# Patient Record
Sex: MALE | Race: WHITE | NOT HISPANIC OR LATINO | Employment: OTHER | URBAN - NONMETROPOLITAN AREA
[De-identification: names, ages, dates, MRNs, and addresses within clinical notes are randomized per-mention and may not be internally consistent; named-entity substitution may affect disease eponyms.]

---

## 2017-01-05 ENCOUNTER — AMBULATORY - GICH (OUTPATIENT)
Dept: SCHEDULING | Facility: OTHER | Age: 62
End: 2017-01-05

## 2017-01-06 ENCOUNTER — AMBULATORY - GICH (OUTPATIENT)
Dept: RADIOLOGY | Facility: OTHER | Age: 62
End: 2017-01-06

## 2017-01-06 DIAGNOSIS — Z98.890 OTHER SPECIFIED POSTPROCEDURAL STATES: ICD-10-CM

## 2017-01-06 DIAGNOSIS — R52 PAIN: ICD-10-CM

## 2017-01-09 ENCOUNTER — AMBULATORY - GICH (OUTPATIENT)
Dept: UROLOGY | Facility: OTHER | Age: 62
End: 2017-01-09

## 2017-01-09 DIAGNOSIS — E29.1 TESTICULAR HYPOFUNCTION: ICD-10-CM

## 2017-01-13 ENCOUNTER — HOSPITAL ENCOUNTER (OUTPATIENT)
Dept: RADIOLOGY | Facility: OTHER | Age: 62
End: 2017-01-13

## 2017-01-13 DIAGNOSIS — R52 PAIN: ICD-10-CM

## 2017-01-13 DIAGNOSIS — Z98.890 OTHER SPECIFIED POSTPROCEDURAL STATES: ICD-10-CM

## 2017-01-26 ENCOUNTER — AMBULATORY - GICH (OUTPATIENT)
Dept: LAB | Facility: OTHER | Age: 62
End: 2017-01-26

## 2017-01-26 DIAGNOSIS — E29.1 TESTICULAR HYPOFUNCTION: ICD-10-CM

## 2017-01-26 DIAGNOSIS — R39.12 POOR URINARY STREAM: ICD-10-CM

## 2017-01-26 LAB
HCT VFR BLD AUTO: 55.7 % (ref 37–53)
PSA TOTAL (DIAGNOSTIC) - HISTORICAL: 0.87 NG/ML
TESTOST SERPL-MCNC: 333.5 NG/DL (ref 175–781)

## 2017-01-31 ENCOUNTER — COMMUNICATION - GICH (OUTPATIENT)
Dept: UROLOGY | Facility: OTHER | Age: 62
End: 2017-01-31

## 2017-01-31 DIAGNOSIS — E29.1 TESTICULAR HYPOFUNCTION: ICD-10-CM

## 2017-02-09 ENCOUNTER — OFFICE VISIT - GICH (OUTPATIENT)
Dept: UROLOGY | Facility: OTHER | Age: 62
End: 2017-02-09

## 2017-02-09 ENCOUNTER — HISTORY (OUTPATIENT)
Dept: UROLOGY | Facility: OTHER | Age: 62
End: 2017-02-09

## 2017-02-09 ENCOUNTER — AMBULATORY - GICH (OUTPATIENT)
Dept: LAB | Facility: OTHER | Age: 62
End: 2017-02-09

## 2017-02-09 DIAGNOSIS — E29.1 TESTICULAR HYPOFUNCTION: ICD-10-CM

## 2017-02-09 LAB — HCT VFR BLD AUTO: 48.6 % (ref 37–53)

## 2017-02-17 ENCOUNTER — HISTORY (OUTPATIENT)
Dept: FAMILY MEDICINE | Facility: OTHER | Age: 62
End: 2017-02-17

## 2017-02-17 ENCOUNTER — OFFICE VISIT - GICH (OUTPATIENT)
Dept: FAMILY MEDICINE | Facility: OTHER | Age: 62
End: 2017-02-17

## 2017-02-17 DIAGNOSIS — M96.1 POSTLAMINECTOMY SYNDROME, NOT ELSEWHERE CLASSIFIED: ICD-10-CM

## 2017-02-17 DIAGNOSIS — Z79.899 OTHER LONG TERM (CURRENT) DRUG THERAPY: ICD-10-CM

## 2017-02-17 DIAGNOSIS — F33.0 MAJOR DEPRESSIVE DISORDER, RECURRENT, MILD (H): ICD-10-CM

## 2017-02-17 DIAGNOSIS — M54.2 CERVICALGIA: ICD-10-CM

## 2017-02-27 LAB
6-MONOACETYL MORPHINE - HISTORICAL: ABNORMAL NG/ML
AMPHETAMINE URINE - HISTORICAL: ABNORMAL NG/ML
BARBITURATE URINE - HISTORICAL: ABNORMAL NG/ML
BENZODIAZEPINE URINE - HISTORICAL: ABNORMAL NG/ML
BUPRENORPHRINE URINE - HISTORICAL: ABNORMAL NG/ML
COCAINE METAB URINE - HISTORICAL: ABNORMAL NG/ML
CREAT UR - HISTORICAL: 98 MG/DL
ETHYLGLUCURONIDE URINE - HISTORICAL: ABNORMAL NG/ML
FENTANYL URINE - HISTORICAL: ABNORMAL NG/ML
MASS SPECTROMETRY URINE - HISTORICAL: ABNORMAL
METHADONE URINE - HISTORICAL: ABNORMAL NG/ML
OPIATES URINE - HISTORICAL: ABNORMAL NG/ML
OXYCODONE URINE - HISTORICAL: ABNORMAL NG/ML
PH URINE - HISTORICAL: 7.6
PROPOXYPHENE URINE - HISTORICAL: ABNORMAL NG/ML
THC 50 URINE - HISTORICAL: ABNORMAL NG/ML
TRAMADOL - HISTORICAL: ABNORMAL NG/ML

## 2017-03-13 ENCOUNTER — AMBULATORY - GICH (OUTPATIENT)
Dept: UROLOGY | Facility: OTHER | Age: 62
End: 2017-03-13

## 2017-03-13 DIAGNOSIS — E29.1 TESTICULAR HYPOFUNCTION: ICD-10-CM

## 2017-03-15 ENCOUNTER — AMBULATORY - GICH (OUTPATIENT)
Dept: RADIOLOGY | Facility: OTHER | Age: 62
End: 2017-03-15

## 2017-03-15 DIAGNOSIS — G89.29 OTHER CHRONIC PAIN: ICD-10-CM

## 2017-03-15 DIAGNOSIS — M53.3 SACROCOCCYGEAL DISORDERS, NOT ELSEWHERE CLASSIFIED: ICD-10-CM

## 2017-03-15 DIAGNOSIS — R52 PAIN: ICD-10-CM

## 2017-03-15 DIAGNOSIS — Z98.890 OTHER SPECIFIED POSTPROCEDURAL STATES: ICD-10-CM

## 2017-03-20 ENCOUNTER — HOSPITAL ENCOUNTER (OUTPATIENT)
Dept: RADIOLOGY | Facility: OTHER | Age: 62
End: 2017-03-20

## 2017-03-20 DIAGNOSIS — Z98.890 OTHER SPECIFIED POSTPROCEDURAL STATES: ICD-10-CM

## 2017-03-20 DIAGNOSIS — M53.3 SACROCOCCYGEAL DISORDERS, NOT ELSEWHERE CLASSIFIED: ICD-10-CM

## 2017-03-20 DIAGNOSIS — G89.29 OTHER CHRONIC PAIN: ICD-10-CM

## 2017-03-20 DIAGNOSIS — R52 PAIN: ICD-10-CM

## 2017-04-20 ENCOUNTER — HISTORY (OUTPATIENT)
Dept: UROLOGY | Facility: OTHER | Age: 62
End: 2017-04-20

## 2017-04-20 ENCOUNTER — OFFICE VISIT - GICH (OUTPATIENT)
Dept: UROLOGY | Facility: OTHER | Age: 62
End: 2017-04-20

## 2017-04-20 DIAGNOSIS — E29.1 TESTICULAR HYPOFUNCTION: ICD-10-CM

## 2017-04-27 ENCOUNTER — AMBULATORY - GICH (OUTPATIENT)
Dept: UROLOGY | Facility: OTHER | Age: 62
End: 2017-04-27

## 2017-04-27 DIAGNOSIS — E29.1 TESTICULAR HYPOFUNCTION: ICD-10-CM

## 2017-05-15 ENCOUNTER — OFFICE VISIT - GICH (OUTPATIENT)
Dept: FAMILY MEDICINE | Facility: OTHER | Age: 62
End: 2017-05-15

## 2017-05-15 DIAGNOSIS — M53.3 SACROCOCCYGEAL DISORDERS, NOT ELSEWHERE CLASSIFIED: ICD-10-CM

## 2017-05-15 DIAGNOSIS — M96.1 POSTLAMINECTOMY SYNDROME, NOT ELSEWHERE CLASSIFIED: ICD-10-CM

## 2017-05-15 DIAGNOSIS — M54.2 CERVICALGIA: ICD-10-CM

## 2017-05-15 DIAGNOSIS — Z02.89 ENCOUNTER FOR OTHER ADMINISTRATIVE EXAMINATIONS: ICD-10-CM

## 2017-05-17 ENCOUNTER — HOSPITAL ENCOUNTER (OUTPATIENT)
Dept: PHYSICAL THERAPY | Facility: OTHER | Age: 62
Setting detail: THERAPIES SERIES
End: 2017-05-17
Attending: FAMILY MEDICINE

## 2017-05-17 DIAGNOSIS — M53.3 SACROCOCCYGEAL DISORDERS, NOT ELSEWHERE CLASSIFIED: ICD-10-CM

## 2017-05-31 ENCOUNTER — HOSPITAL ENCOUNTER (OUTPATIENT)
Dept: PHYSICAL THERAPY | Facility: OTHER | Age: 62
Setting detail: THERAPIES SERIES
End: 2017-05-31
Attending: FAMILY MEDICINE

## 2017-06-05 ENCOUNTER — HOSPITAL ENCOUNTER (OUTPATIENT)
Dept: PHYSICAL THERAPY | Facility: OTHER | Age: 62
Setting detail: THERAPIES SERIES
End: 2017-06-05
Attending: FAMILY MEDICINE

## 2017-06-08 ENCOUNTER — HOSPITAL ENCOUNTER (OUTPATIENT)
Dept: PHYSICAL THERAPY | Facility: OTHER | Age: 62
Setting detail: THERAPIES SERIES
End: 2017-06-08
Attending: FAMILY MEDICINE

## 2017-06-13 ENCOUNTER — HOSPITAL ENCOUNTER (OUTPATIENT)
Dept: PHYSICAL THERAPY | Facility: OTHER | Age: 62
Setting detail: THERAPIES SERIES
End: 2017-06-13
Attending: FAMILY MEDICINE

## 2017-06-15 ENCOUNTER — HOSPITAL ENCOUNTER (OUTPATIENT)
Dept: PHYSICAL THERAPY | Facility: OTHER | Age: 62
Setting detail: THERAPIES SERIES
End: 2017-06-15
Attending: FAMILY MEDICINE

## 2017-06-15 ENCOUNTER — AMBULATORY - GICH (OUTPATIENT)
Dept: LAB | Facility: OTHER | Age: 62
End: 2017-06-15

## 2017-06-15 DIAGNOSIS — E29.1 TESTICULAR HYPOFUNCTION: ICD-10-CM

## 2017-06-15 LAB
HCT VFR BLD AUTO: 51.1 % (ref 37–53)
TESTOST SERPL-MCNC: 421.5 NG/DL (ref 175–781)

## 2017-06-20 ENCOUNTER — HOSPITAL ENCOUNTER (OUTPATIENT)
Dept: PHYSICAL THERAPY | Facility: OTHER | Age: 62
Setting detail: THERAPIES SERIES
End: 2017-06-20
Attending: FAMILY MEDICINE

## 2017-06-27 ENCOUNTER — HOSPITAL ENCOUNTER (OUTPATIENT)
Dept: PHYSICAL THERAPY | Facility: OTHER | Age: 62
Setting detail: THERAPIES SERIES
End: 2017-06-27
Attending: FAMILY MEDICINE

## 2017-06-29 ENCOUNTER — HISTORY (OUTPATIENT)
Dept: UROLOGY | Facility: OTHER | Age: 62
End: 2017-06-29

## 2017-06-29 ENCOUNTER — HOSPITAL ENCOUNTER (OUTPATIENT)
Dept: PHYSICAL THERAPY | Facility: OTHER | Age: 62
Setting detail: THERAPIES SERIES
End: 2017-06-29
Attending: FAMILY MEDICINE

## 2017-06-29 ENCOUNTER — OFFICE VISIT - GICH (OUTPATIENT)
Dept: UROLOGY | Facility: OTHER | Age: 62
End: 2017-06-29

## 2017-06-29 DIAGNOSIS — E29.1 TESTICULAR HYPOFUNCTION: ICD-10-CM

## 2017-06-29 DIAGNOSIS — R39.12 POOR URINARY STREAM: ICD-10-CM

## 2017-07-05 ENCOUNTER — OFFICE VISIT - GICH (OUTPATIENT)
Dept: FAMILY MEDICINE | Facility: OTHER | Age: 62
End: 2017-07-05

## 2017-07-05 ENCOUNTER — HISTORY (OUTPATIENT)
Dept: FAMILY MEDICINE | Facility: OTHER | Age: 62
End: 2017-07-05
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
## 2017-07-25 ENCOUNTER — AMBULATORY - GICH (OUTPATIENT)
Dept: UROLOGY | Facility: OTHER | Age: 62
End: 2017-07-25

## 2017-07-25 DIAGNOSIS — E29.1 TESTICULAR HYPOFUNCTION: ICD-10-CM

## 2017-07-26 ENCOUNTER — AMBULATORY - GICH (OUTPATIENT)
Dept: SCHEDULING | Facility: OTHER | Age: 62
End: 2017-07-26

## 2017-08-02 ENCOUNTER — OFFICE VISIT - GICH (OUTPATIENT)
Dept: FAMILY MEDICINE | Facility: OTHER | Age: 62
End: 2017-08-02

## 2017-08-02 ENCOUNTER — HISTORY (OUTPATIENT)
Dept: FAMILY MEDICINE | Facility: OTHER | Age: 62
End: 2017-08-02

## 2017-08-02 DIAGNOSIS — E29.1 TESTICULAR HYPOFUNCTION: ICD-10-CM

## 2017-08-02 DIAGNOSIS — M54.2 CERVICALGIA: ICD-10-CM

## 2017-08-02 DIAGNOSIS — R39.12 POOR URINARY STREAM: ICD-10-CM

## 2017-08-02 DIAGNOSIS — M62.82 RHABDOMYOLYSIS: ICD-10-CM

## 2017-08-02 DIAGNOSIS — Z02.89 ENCOUNTER FOR OTHER ADMINISTRATIVE EXAMINATIONS: ICD-10-CM

## 2017-08-02 DIAGNOSIS — M96.1 POSTLAMINECTOMY SYNDROME, NOT ELSEWHERE CLASSIFIED: ICD-10-CM

## 2017-08-02 DIAGNOSIS — M79.10 MYALGIA: ICD-10-CM

## 2017-08-02 LAB
ANION GAP - HISTORICAL: 8 (ref 5–18)
BUN SERPL-MCNC: 19 MG/DL (ref 7–25)
BUN/CREAT RATIO - HISTORICAL: 18
CALCIUM SERPL-MCNC: 9.5 MG/DL (ref 8.6–10.3)
CHLORIDE SERPLBLD-SCNC: 100 MMOL/L (ref 98–107)
CK SERPL-CCNC: 293 IU/L (ref 30–223)
CO2 SERPL-SCNC: 28 MMOL/L (ref 21–31)
CREAT SERPL-MCNC: 1.05 MG/DL (ref 0.7–1.3)
GFR IF NOT AFRICAN AMERICAN - HISTORICAL: >60 ML/MIN/1.73M2
GLUCOSE SERPL-MCNC: 94 MG/DL (ref 70–105)
HCT VFR BLD AUTO: 54 % (ref 37–53)
POTASSIUM SERPL-SCNC: 4.2 MMOL/L (ref 3.5–5.1)
PSA TOTAL (DIAGNOSTIC) - HISTORICAL: 0.88 NG/ML
SODIUM SERPL-SCNC: 136 MMOL/L (ref 133–143)
TESTOST SERPL-MCNC: 518.1 NG/DL (ref 175–781)
TSH - HISTORICAL: 7.64 UIU/ML (ref 0.34–5.6)

## 2017-08-03 LAB — LYME SCREEN W/REFLEX WEST BLOT - HISTORICAL: NEGATIVE

## 2017-08-07 ENCOUNTER — COMMUNICATION - GICH (OUTPATIENT)
Dept: FAMILY MEDICINE | Facility: OTHER | Age: 62
End: 2017-08-07

## 2017-08-10 LAB
6-MONOACETYL MORPHINE - HISTORICAL: ABNORMAL NG/ML
AMPHETAMINE URINE - HISTORICAL: ABNORMAL NG/ML
BARBITURATE URINE - HISTORICAL: ABNORMAL NG/ML
BENZODIAZEPINE URINE - HISTORICAL: ABNORMAL NG/ML
BUPRENORPHRINE URINE - HISTORICAL: ABNORMAL NG/ML
COCAINE METAB URINE - HISTORICAL: ABNORMAL NG/ML
CREAT UR - HISTORICAL: 122 MG/DL
ETHYLGLUCURONIDE URINE - HISTORICAL: ABNORMAL NG/ML
FENTANYL URINE - HISTORICAL: ABNORMAL NG/ML
MASS SPECTROMETRY URINE - HISTORICAL: ABNORMAL
METHADONE URINE - HISTORICAL: ABNORMAL NG/ML
OPIATES URINE - HISTORICAL: ABNORMAL NG/ML
OXYCODONE URINE - HISTORICAL: ABNORMAL NG/ML
PH URINE - HISTORICAL: 6.9
PROPOXYPHENE URINE - HISTORICAL: ABNORMAL NG/ML
THC 50 URINE - HISTORICAL: ABNORMAL NG/ML
TRAMADOL - HISTORICAL: ABNORMAL NG/ML

## 2017-08-11 ENCOUNTER — OFFICE VISIT - GICH (OUTPATIENT)
Dept: SURGERY | Facility: OTHER | Age: 62
End: 2017-08-11

## 2017-08-11 ENCOUNTER — HISTORY (OUTPATIENT)
Dept: SURGERY | Facility: OTHER | Age: 62
End: 2017-08-11

## 2017-08-11 ENCOUNTER — COMMUNICATION - GICH (OUTPATIENT)
Dept: LAB | Facility: OTHER | Age: 62
End: 2017-08-11

## 2017-08-11 DIAGNOSIS — E03.9 HYPOTHYROIDISM: ICD-10-CM

## 2017-08-11 DIAGNOSIS — M79.661 PAIN OF RIGHT LOWER LEG: ICD-10-CM

## 2017-08-11 DIAGNOSIS — M62.82 RHABDOMYOLYSIS: ICD-10-CM

## 2017-08-11 DIAGNOSIS — M79.662 PAIN OF LEFT LOWER LEG: ICD-10-CM

## 2017-08-15 ENCOUNTER — HOSPITAL ENCOUNTER (OUTPATIENT)
Dept: RADIOLOGY | Facility: OTHER | Age: 62
End: 2017-08-15
Attending: SURGERY

## 2017-08-15 ENCOUNTER — AMBULATORY - GICH (OUTPATIENT)
Dept: SURGERY | Facility: OTHER | Age: 62
End: 2017-08-15

## 2017-08-15 DIAGNOSIS — M62.82 RHABDOMYOLYSIS: ICD-10-CM

## 2017-08-15 DIAGNOSIS — M79.661 PAIN OF RIGHT LOWER LEG: ICD-10-CM

## 2017-08-15 DIAGNOSIS — M79.662 PAIN OF LEFT LOWER LEG: ICD-10-CM

## 2017-08-24 ENCOUNTER — AMBULATORY - GICH (OUTPATIENT)
Dept: SURGERY | Facility: OTHER | Age: 62
End: 2017-08-24

## 2017-08-24 ENCOUNTER — HISTORY (OUTPATIENT)
Dept: SURGERY | Facility: OTHER | Age: 62
End: 2017-08-24

## 2017-08-24 ENCOUNTER — AMBULATORY - GICH (OUTPATIENT)
Dept: LAB | Facility: OTHER | Age: 62
End: 2017-08-24

## 2017-08-24 ENCOUNTER — COMMUNICATION - GICH (OUTPATIENT)
Dept: UROLOGY | Facility: OTHER | Age: 62
End: 2017-08-24

## 2017-08-24 DIAGNOSIS — E03.9 HYPOTHYROIDISM: ICD-10-CM

## 2017-08-24 DIAGNOSIS — R74.8 ABNORMAL LEVELS OF OTHER SERUM ENZYMES: ICD-10-CM

## 2017-08-24 LAB
T3 SERPL-MCNC: 98.91 NG/DL (ref 87–178)
T4 FREE SERPL-MCNC: 0.74 NG/DL (ref 0.58–1.64)
TSH - HISTORICAL: 8.26 UIU/ML (ref 0.34–5.6)

## 2017-08-29 ENCOUNTER — COMMUNICATION - GICH (OUTPATIENT)
Dept: SURGERY | Facility: OTHER | Age: 62
End: 2017-08-29

## 2017-08-31 ENCOUNTER — OFFICE VISIT - GICH (OUTPATIENT)
Dept: SURGERY | Facility: OTHER | Age: 62
End: 2017-08-31

## 2017-08-31 ENCOUNTER — HISTORY (OUTPATIENT)
Dept: SURGERY | Facility: OTHER | Age: 62
End: 2017-08-31

## 2017-08-31 DIAGNOSIS — M79.10 MYALGIA: ICD-10-CM

## 2017-08-31 LAB
ANION GAP - HISTORICAL: 9 (ref 5–18)
BUN SERPL-MCNC: 23 MG/DL (ref 7–25)
BUN/CREAT RATIO - HISTORICAL: 21
CALCIUM SERPL-MCNC: 9.6 MG/DL (ref 8.6–10.3)
CHLORIDE SERPLBLD-SCNC: 101 MMOL/L (ref 98–107)
CK SERPL-CCNC: 281 IU/L (ref 30–223)
CO2 SERPL-SCNC: 27 MMOL/L (ref 21–31)
CREAT SERPL-MCNC: 1.1 MG/DL (ref 0.7–1.3)
GFR IF NOT AFRICAN AMERICAN - HISTORICAL: >60 ML/MIN/1.73M2
GLUCOSE SERPL-MCNC: 90 MG/DL (ref 70–105)
LDH SERPL-CCNC: 198 IU/L (ref 140–271)
POTASSIUM SERPL-SCNC: 4.5 MMOL/L (ref 3.5–5.1)
RHEUMATOID FACTOR - HISTORICAL: <14 IU/ML
SODIUM SERPL-SCNC: 137 MMOL/L (ref 133–143)

## 2017-09-02 LAB — Lab: 6.3 U/L

## 2017-09-05 LAB
ANA INTERPRETATION: NEGATIVE
DRVVT RATIO - HISTORICAL: 1
PTT-LA RATIO - HISTORICAL: 1.01

## 2017-09-06 ENCOUNTER — COMMUNICATION - GICH (OUTPATIENT)
Dept: UROLOGY | Facility: OTHER | Age: 62
End: 2017-09-06

## 2017-09-06 DIAGNOSIS — E29.1 TESTICULAR HYPOFUNCTION: ICD-10-CM

## 2017-09-07 ENCOUNTER — OFFICE VISIT - GICH (OUTPATIENT)
Dept: FAMILY MEDICINE | Facility: OTHER | Age: 62
End: 2017-09-07

## 2017-09-07 ENCOUNTER — HISTORY (OUTPATIENT)
Dept: UROLOGY | Facility: OTHER | Age: 62
End: 2017-09-07

## 2017-09-07 ENCOUNTER — OFFICE VISIT - GICH (OUTPATIENT)
Dept: UROLOGY | Facility: OTHER | Age: 62
End: 2017-09-07

## 2017-09-07 ENCOUNTER — HISTORY (OUTPATIENT)
Dept: FAMILY MEDICINE | Facility: OTHER | Age: 62
End: 2017-09-07

## 2017-09-07 DIAGNOSIS — R74.8 ABNORMAL LEVELS OF OTHER SERUM ENZYMES: ICD-10-CM

## 2017-09-07 DIAGNOSIS — E29.1 TESTICULAR HYPOFUNCTION: ICD-10-CM

## 2017-10-12 ENCOUNTER — AMBULATORY - GICH (OUTPATIENT)
Dept: UROLOGY | Facility: OTHER | Age: 62
End: 2017-10-12

## 2017-10-12 DIAGNOSIS — E29.1 TESTICULAR HYPOFUNCTION: ICD-10-CM

## 2017-11-16 ENCOUNTER — OFFICE VISIT - GICH (OUTPATIENT)
Dept: UROLOGY | Facility: OTHER | Age: 62
End: 2017-11-16

## 2017-11-16 ENCOUNTER — HISTORY (OUTPATIENT)
Dept: UROLOGY | Facility: OTHER | Age: 62
End: 2017-11-16

## 2017-11-16 DIAGNOSIS — E29.1 TESTICULAR HYPOFUNCTION: ICD-10-CM

## 2017-11-21 ENCOUNTER — OFFICE VISIT - GICH (OUTPATIENT)
Dept: FAMILY MEDICINE | Facility: OTHER | Age: 62
End: 2017-11-21

## 2017-11-21 ENCOUNTER — HISTORY (OUTPATIENT)
Dept: FAMILY MEDICINE | Facility: OTHER | Age: 62
End: 2017-11-21

## 2017-11-21 DIAGNOSIS — M54.2 CERVICALGIA: ICD-10-CM

## 2017-11-21 DIAGNOSIS — M96.1 POSTLAMINECTOMY SYNDROME, NOT ELSEWHERE CLASSIFIED: ICD-10-CM

## 2017-11-21 DIAGNOSIS — Z02.89 ENCOUNTER FOR OTHER ADMINISTRATIVE EXAMINATIONS: ICD-10-CM

## 2017-12-14 ENCOUNTER — AMBULATORY - GICH (OUTPATIENT)
Dept: UROLOGY | Facility: OTHER | Age: 62
End: 2017-12-14

## 2017-12-14 DIAGNOSIS — E29.1 TESTICULAR HYPOFUNCTION: ICD-10-CM

## 2017-12-22 ENCOUNTER — COMMUNICATION - GICH (OUTPATIENT)
Dept: FAMILY MEDICINE | Facility: OTHER | Age: 62
End: 2017-12-22

## 2017-12-22 DIAGNOSIS — I10 ESSENTIAL (PRIMARY) HYPERTENSION: ICD-10-CM

## 2017-12-27 NOTE — PROGRESS NOTES
Patient Information     Patient Name MRN Sex Km Wei [id number] Male 1955      Progress Notes by Shayla Shaw PT at 2017  8:11 AM     Author:  Shayla Shaw PT Service:  (none) Author Type:  PT- Physical Therapist     Filed:  2017  8:55 AM Date of Service:  2017  8:11 AM Status:  Signed     :  Shayla Shaw PT (PT- Physical Therapist)            [facility]  Outpatient PT - Daily Note        Date of Service: 2017   Visit #5    Patient Name: Km Freedman   YOB: 1955   Referring MD/Provider: Homar Flor MD  Diagnosis: sacroiliac joint pain  Treatment Diagnosis: low back pain, right hip pain, muscle weakness, myofascial tightness  Insurance: [employer]/Medicare  Start of Service: 17  Certification Dates: Start of Service: 17   Medicare/MA Re-Cert Due: 17      Subjective        Pain Ratin = no pain, comfortable / Location:  Right hip    Minimal hip pain, does not stay long. Still walking at night, no issues with pain. More soreness is upper back below the shoulder blade.  Was able to lie on hips a little last night for sleep.    Objective  Postural loading:  General Listening: left chest/upper back  Head:  symmetrical  Shoulders: R/R  Pelvis: symmetrical    Noted FRS right T3, T6  Today's Intervention:    Seated MET FRS left T6, T3  OSFM- right triangular ligament  MFR- right lateral fascial line    Home Exercise Program:  Hook lying trunk rotation with knees apart to reduce right hip pain  Supine hamstring stretch on/off  Prone iliopsoas stretch left and right  Seated trunk rotation  Assessment    Therapist Assessment:  patient presents with chronic low back and right hip pain that has worsened over the last year. History of lumbar fusions, L4-5, L5-S1; has pain stimulator. No current HEP but does walk 2 miles daily if able.  Noted poor ability to rotate trunk, tight myofascial structures throughout  trunk and abdomen, limited scar mobility from fusions, weakness with gluteals and hip muscles.            Goals:  Patient goal (time reference required): reduce hip and back pain with walking and sitting .     Long term goal: Patient is to self-manage symptoms and return to prior function in 16 weeks.       Functional goals:   Patient is to be independent in their Home Exercise Program in 16 weeks.  Patient is to tolerate walking with normal gait with 1-2/10 pain up to 45 minutes in 16 weeks.   Patient is report the ability to sleep 4 hours without awakening due to pain in 16 weeks. Still has back pain but no hip pain  Patient is to display and maintain normal joint mobility/symmetry in the lumbar spine and pelvis to allow correct stabilization during walking and lifting in 16 weeks.  Patient is to demonstrate ability to sit for 60 minutes for car travel in 16 weeks.   Patient is to report decreased pain to allow decreased reliance on pain medications by 25% in 16 weeks.      Patient participated in goal selection and understand(s) the plan of care: Yes   Patient Potential for Achieving Desired Outcome:  Excellent    Response to Intervention:  improved loading through right shoulder    Plan    Treatment Plan / Targeted Outcomes:     Frequency:   16 visits     Duration of Treatment: 8 weeks    Planned Interventions:  Possible physical therapy interventions include but are not limited to:   Home Exercise Program development  Therapeutic Exercise (ROM & Strengthening)  Manual Therapy  Neuromuscular Re-education  Ultrasound  Electrical Stimulation  Phonophoresis with Ketoprofen  Iontophoresis with Dexamethasone  Therapeutic Activities  Hot pack  Cold pack    Plan for next visit:  MFR, MET, advance HEP if indicated    Student or PTA has been instructed in and demonstrates skills necessary to carry out above stated treatment plan: Yes    Thank you for your referral to [facility].  Please call with any  questions, concerns or comments.  [phone]

## 2017-12-27 NOTE — PROGRESS NOTES
Patient Information     Patient Name MRN Sex Km Wei [id number] Male 1955      Progress Notes by Shayla Shaw PT at 6/15/2017  9:16 AM     Author:  Shayla Shaw PT Service:  (none) Author Type:  PT- Physical Therapist     Filed:  6/15/2017  9:54 AM Date of Service:  6/15/2017  9:16 AM Status:  Signed     :  Shayla Shaw PT (PT- Physical Therapist)            [facility]  Outpatient PT - Daily Note        Date of Service: 6/15/2017   Visit #6    Patient Name: Km Freedman   YOB: 1955   Referring MD/Provider: Homar Flor MD  Diagnosis: sacroiliac joint pain  Treatment Diagnosis: low back pain, right hip pain, muscle weakness, myofascial tightness  Insurance: [employer]/Medicare  Start of Service: 17  Certification Dates: Start of Service: 17   Medicare/MA Re-Cert Due: 17      Subjective        Pain Ratin = no pain, comfortable / Location:  Right hip      Hip was sore after last PT, took tylenol and then was fine.    Objective  Postural loading:  General Listening:  Head:  symmetrical  Shoulders: R/R  Pelvis: symmetrical      Today's Intervention:    MFR- right sternal ligaments, vertebral pericardial ligament right, superficial cervical fascia    Home Exercise Program:  Hook lying trunk rotation with knees apart to reduce right hip pain  Supine hamstring stretch on/off  Prone iliopsoas stretch left and right  Seated trunk rotation  Assessment    Therapist Assessment:  patient presents with chronic low back and right hip pain that has worsened over the last year. History of lumbar fusions, L4-5, L5-S1; has pain stimulator. No current HEP but does walk 2 miles daily if able.  Noted poor ability to rotate trunk, tight myofascial structures throughout trunk and abdomen, limited scar mobility from fusions, weakness with gluteals and hip muscles.            Goals:  Patient goal (time reference required): reduce hip and back  pain with walking and sitting .     Long term goal: Patient is to self-manage symptoms and return to prior function in 16 weeks.       Functional goals:   Patient is to be independent in their Home Exercise Program in 16 weeks.  Patient is to tolerate walking with normal gait with 1-2/10 pain up to 45 minutes in 16 weeks.   Patient is report the ability to sleep 4 hours without awakening due to pain in 16 weeks. Still has back pain but no hip pain  Patient is to display and maintain normal joint mobility/symmetry in the lumbar spine and pelvis to allow correct stabilization during walking and lifting in 16 weeks.  Patient is to demonstrate ability to sit for 60 minutes for car travel in 16 weeks.   Patient is to report decreased pain to allow decreased reliance on pain medications by 25% in 16 weeks.      Patient participated in goal selection and understand(s) the plan of care: Yes   Patient Potential for Achieving Desired Outcome:  Excellent    Response to Intervention:  improved loading through right shoulder but still tight    Plan    Treatment Plan / Targeted Outcomes:     Frequency:   16 visits     Duration of Treatment: 8 weeks    Planned Interventions:  Possible physical therapy interventions include but are not limited to:   Home Exercise Program development  Therapeutic Exercise (ROM & Strengthening)  Manual Therapy  Neuromuscular Re-education  Ultrasound  Electrical Stimulation  Phonophoresis with Ketoprofen  Iontophoresis with Dexamethasone  Therapeutic Activities  Hot pack  Cold pack    Plan for next visit:  MFR, MET, advance HEP if indicated    Student or PTA has been instructed in and demonstrates skills necessary to carry out above stated treatment plan: Yes    Thank you for your referral to [facility].  Please call with any questions, concerns or comments.  [phone]
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
## 2017-12-27 NOTE — PROGRESS NOTES
"Patient Information     Patient Name MRN Km Ronquillo [id number] Male 1955      Progress Notes by Peter Turner MD at 2017  8:45 AM     Author:  Peter Turner MD Service:  (none) Author Type:  Physician     Filed:  2017  9:26 AM Encounter Date:  2017 Status:  Signed     :  Peter Turner MD (Physician)            Type of Visit  EST    Chief Complaint  Hypogonadism    HPI  Mr. Freedman is a 61 y.o. male who follows up with symptomatic hypogonadism.  Primary symptoms include low energy and poor sleep.  Symptoms started 5 years a go.  He previously failed patches and short acting injections required too many visits.    He started Aveed well  Almost 2 years ago.  Since starting replacement therapy almost 2 years ago he reports excellent improvement in energy.  He denies acne or breast swelling.  He does occasionally have mild breast tenderness.      Review of Systems  I reviewed the ROS with the patient today.    Nursing Notes:   Janette Park RN  2017  8:33 AM  Signed  Review of Systems:    Weight loss:    No     Recent fever/chills:  No   Night sweats:   No  Current skin rash:  No   Recent hair loss:  No  Heat intolerance:  No   Cold intolerance:  No  Chest pain:   No   Palpitations:   No  Shortness of breath:  No   Wheezing:   No  Constipation:    No   Diarrhea:   No   Nausea:   No   Vomiting:   No   Kidney/side pain:  No   Back pain:   Yes  Frequent headaches:  No   Dizziness:     No  Leg swelling:   No   Calf pain:    Yes        Physical Exam  Vitals:     17 0829   BP: 128/80   Pulse: 68   Resp: 16   Weight: 77.5 kg (170 lb 12.8 oz)   Height: 1.715 m (5' 7.5\")      Constitutional: No acute distress.  Alert and cooperative   Head: NCAT  Eyes: Conjunctivae normal  Cardiovascular: Regular rate.  Pulmonary/Chest: Respirations are even and non-labored bilaterally, no audible wheezing  Abdominal: Soft. No distension, tenderness, masses or guarding. "   Extremities: GILDARDO x 4, Warm. No clubbing.  No cyanosis.    Skin: Pink, warm and dry.  No visible rashes noted.  Back:  No left CVA tenderness.  No right CVA tenderness.  Genitourinary:  Nonpalpable bladder    Labs  CREATININE (mg/dL)    Date Value   08/31/2017 1.10     Results for TEJINDER JONES (MRN [id number]) as of 9/7/2017 09:27   8/2/2017 09:11   HEMATOCRIT                54.0 (H)   TESTOSTERONE,TOTAL 518.1     Results for TEJINDER JONES (MRN [id number]) as of 9/22/2016 09:15   3/7/2016 09:16   TESTOSTERONE,TOTAL 104.4 (L)     Results for TEJINDER JONES (MRN [id number]) as of 9/7/2017 09:37   9/7/2016 08:55 1/26/2017 08:33 8/2/2017 09:11   PSA TOTAL (DIAGNOSTIC) 0.739 0.867 0.875     Testosterone Injection  Today the patient received an injection of testosterone undecanoate 750mg.  This was given in the gluteus.  The results of this injection: None  Patient was monitored for 30 minutes following the injection.    Assessment  Hypogonadism- continue Aveed q10 weeks  He is  Doing well and will be due for labs in 8 weeks.  If labs are stable in 8 weeks we will transition to labs every third  cycle.    Plan  Aveed 750mg IM every 10 weeks with labs 8 weeks after every other injection (lab only appt) and will review the results at the following injection appt.  He is due for labs in 8 weeks (lab only appt)- T, HCT

## 2017-12-27 NOTE — PROGRESS NOTES
"Patient Information     Patient Name MRN Sex Km Wei [id number] Male 1955      Progress Notes by Francine Frye DO at 2017  4:00 PM     Author:  Francine Frye DO Service:  (none) Author Type:  PHYS- Osteopathic     Filed:  2017  2:38 PM Encounter Date:  2017 Status:  Signed     :  Francine Frye DO (PHYS- Osteopathic)            Patient is doing well post-op from there recent  Muscle biopsy.  The specimen was processed wrong the results are inconclusive.    He has been having no nausea or vomiting; no chest pain, shortness of breath or coughing up anything. Patient has been urinating without any difficulties and moving bowel w/ no straining or bleeding. Patient has no calf pain swelling, tenderness, or redness. Patient has been sleeping at night with no problems. Patient has been ambulating without difficulty. Patient has been able to tolerate a regular diet. Patient has had good relief with previously prescribed pain meds.      /86  Temp 98.1  F (36.7  C) (Tympanic)  Ht 1.715 m (5' 7.5\")  Wt 76.3 kg (168 lb 3.2 oz)  BMI 25.96 kg/m2    HEENT: all negative  No jaundice.  No eye redness or pain.  No throat pain.  L: CTA b/l  Abdom: + BS. no distensions.  LOWER EXTREMITY: no swelling or calf pain  The incision(s) is clean dry and intact without redness, drainage or swelling or bleeding.   ?  Pathology: see Epic  ?  Pt doing well; with no complaints. Reviewed path report. Incision(s): Clean/dry/intact and healing nicely. Removed sutures and placed steri's.    Pt should keep the area clean and dry: wash with plain soap and water. Keep covered. Does not need to put anything on the lesions. May use abx ointment if desires. Avoid lifting Over 5 #'s x1 week. No strenuous activity or hot tubs/sauna's/pool/lake x1 week. Ice and NSAID's for pain. Monitor for redness/drainage/swelling/increase in pain/temp > 101. May have serous drainage/should not be purulent. Call " Clinic if these occur.  As far as heeling: may be red and firm for about 1-3 weeks. This is the normal heeling process and will smooth out to a silvery/white line. Avoid sun exposure X1 year. May take months for redness to dissipate. If is sun burnt, will stay red. Can use vit E oil.  Today we discussed wound care, activities, return to work, warnings signs. Pt is doing well.      Patient Active Problem List     Diagnosis  Code     TROCHANTERIC BURSITIS, BILATERAL M76.899     LEG PAIN, CHRONIC M79.609     DISC DISEASE, LUMBAR M51.37     LEG CRAMPS R25.2     ARTHRITIS M12.9     RESTLESS LEG SYNDROME G25.81     HYPERCHOLESTEROLEMIA E78.00     CHONDROMALACIA PATELLA, LEFT M22.40     LATERAL EPICONDYLITIS, RIGHT M77.10     GERD K21.9     NECK PAIN, CHRONIC M54.2     DEPRESSION, MAJOR, RECURRENT, MILD F33.0     Pain medication agreement signed: 5/17/13, UPDATED 1/2014 Z02.89     HTN (hypertension) I10     Trochanteric bursitis of right hip M70.61     Medication management Z79.899     Failed back syndrome of lumbar spine M96.1     Hypogonadism on Aveed q10 weeks E29.1     Bilateral calf pain M79.661, M79.662     Elevated CPK R74.8     Muscle pain M79.1         Call the office if have any questions or concern's.  F/u with PCP for routine medical care.  [place] not require further pain med's.  Will F/U :prn          15 Minutes is spent today in consultation with the patient.  > 50% of the time is spent in discussing the patient diagnosis, treatment plan, medications and or surgery.   Labs ordered-   Follow up w/ Dr. Flor  Will need to go to [facility] to have the muscle biopsy done     Francine Frye,

## 2017-12-28 NOTE — PROGRESS NOTES
"Patient Information     Patient Name MRN Km Ronquillo [id number] Male 1955      Progress Notes by Peter Turner MD at 2017  8:45 AM     Author:  Peter Turner MD Service:  (none) Author Type:  Physician     Filed:  2017  9:17 AM Encounter Date:  2017 Status:  Signed     :  Peter Turner MD (Physician)            Type of Visit  EST    Chief Complaint  Hypogonadism    HPI  Mr. Freedman is a 61 y.o. male who follows up with symptomatic hypogonadism.  Primary symptoms include low energy and poor sleep.  Symptoms started 5 years ago.  He previously failed patches and short acting injections required too many visits.    He started Aveed over 1 year ago.  He continues to report improvement however this is intermittent and he perceives greater improvement at times and less improvement and others.  Overall his energy level is improved.  He does have some residual difficulty with sleep.  He has not been diagnosed with obstructive sleep apnea and he has not undergone testing.  He does state his primary care physician has recommended he consider testing.  He denies breast swelling or tenderness or acne.      Review of Systems  I reviewed the ROS with the patient today.    Weight loss:    No     Recent fever/chills:  No   Night sweats:   No  Current skin rash:  No   Recent hair loss:  No  Heat intolerance:  No   Cold intolerance:  No  Chest pain:   No   Palpitations:   No  Shortness of breath:  No   Wheezing:   No  Constipation:    No   Diarrhea:   No   Nausea:   No   Vomiting:   No   Kidney/side pain:  No   Back pain:   Yes  Frequent headaches:  No   Dizziness:     No  Leg swelling:   No   Calf pain:    Yes    Physical Exam  Vitals:     17 0848   BP: 122/80   Pulse: 64   Resp: 16   Weight: 76.7 kg (169 lb 3.2 oz)   Height: 1.753 m (5' 9\")      Constitutional: No acute distress.  Alert and cooperative   Head: NCAT  Eyes: Conjunctivae normal  Cardiovascular: Regular " rate.  Pulmonary/Chest: Respirations are even and non-labored bilaterally, no audible wheezing  Abdominal: Soft. No distension, tenderness, masses or guarding.   Extremities: GILDARDO x 4, Warm. No clubbing.  No cyanosis.    Skin: Pink, warm and dry.  No visible rashes noted.  Back:  No left CVA tenderness.  No right CVA tenderness.  Genitourinary:  Nonpalpable bladder    Labs  CREATININE (mg/dL)    Date Value   11/28/2016 0.93     Results for TEJINDER JONES (MRN [id number]) as of 2/8/2017 17:15   9/7/2016 08:55 1/26/2017 08:33   HEMATOCRIT                52.6 55.7 (H)   TESTOSTERONE,TOTAL 327.0 333.5   PSA TOTAL (DIAGNOSTIC) 0.739 0.867     Results for TEJINDER JONES (MRN [id number]) as of 2/9/2017 08:42   2/9/2017 08:18   HEMATOCRIT                48.6     Results for TEJINDER JONES (MRN [id number]) as of 9/22/2016 09:15   3/7/2016 09:16   TESTOSTERONE,TOTAL 104.4 (L)       Testosterone Injection  Today the patient received an injection of testosterone undecanoate 750mg.  This was given in the gluteus.  The results of this injection: None  Patient was monitored for 30 minutes following the injection.    Assessment  Hypogonadism- continue Aveed q10 weeks  He is stable.    Plan  Aveed 750mg IM every 10 weeks with labs 8 weeks after every other injection (lab only appt) and will review the results at the following injection appt.  He is due for labs in 8 weeks (lab only appt)- T, HCT, PSA
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
## 2017-12-28 NOTE — TELEPHONE ENCOUNTER
Patient Information     Patient Name MRN Sex Km Wei [id number] Male 1955      Telephone Encounter by Ade Larson at 2017  8:25 AM     Author:  Ade Larson Service:  (none) Author Type:  (none)     Filed:  2017  8:27 AM Encounter Date:  2017 Status:  Signed     :  Ade Larson            Patient was in for lab appointment this morning. He stated that he also needed a PSA for Dr. Turner. Please place orders if needed. Thank you.

## 2017-12-28 NOTE — TELEPHONE ENCOUNTER
Patient Information     Patient Name MRN Sex Km Wei [id number] Male 1955      Telephone Encounter by Ade Becerril at 2017  8:49 AM     Author:  Ade Becerril Service:  (none) Author Type:  (none)     Filed:  2017  8:50 AM Encounter Date:  2017 Status:  Signed     :  Ade Becerril            Needs aveed order scheduled for 17.  Ade Becerril LPN........................2017  8:49 AM      Assessment  Hypogonadism- continue Aveed q10 weeks  He is stable.     Plan  Aveed 750mg IM every 10 weeks with labs 8 weeks after every other injection (lab only appt) and will review the results at the following injection appt.  He is due for labs in 8 weeks (lab only appt)- T, HCT, PSA

## 2017-12-28 NOTE — PROGRESS NOTES
Patient Information     Patient Name MRN Sex Km Wei [id number] Male 1955      Progress Notes by Shayla Shaw PT at 2017  8:13 AM     Author:  Shayla Shaw PT Service:  (none) Author Type:  PT- Physical Therapist     Filed:  2017  9:40 AM Date of Service:  2017  8:13 AM Status:  Signed     :  Shayla Shaw PT (PT- Physical Therapist)            [facility]  Outpatient PT - Daily Note        Date of Service: 2017   Visit #8    Patient Name: Km Freedman   YOB: 1955   Referring MD/Provider: Homar Flor MD  Diagnosis: sacroiliac joint pain  Treatment Diagnosis: low back pain, right hip pain, muscle weakness, myofascial tightness  Insurance: [email or web address]/[email or web address]  Start of Service: 17  Certification Dates: Start of Service: 17   Medicare/MA Re-Cert Due: 17      Subjective        Pain Ratin = Moderate Pain, (Aggravating, Grueling, Upsetting, Frustrating) / Location:  Right hip    Had a couple of days that were rough with the hip, not sure why. Legs ached really bad last night, better when he gets up.       Objective  Postural loading:  General Listening:right hip  Head:  symmetrical  Shoulders: L/R  Pelvis: symmetrical    -FFT  Today's Intervention:      MFR-  Right hip joint inf/medial glide, right and leftlumbar plexus release, log roll right and left hip, MFR left obliques/lateral line    Supine low trunk rotation, hamstring stretch, piriformis stretch, bilateral    Home Exercise Program:  Hook lying trunk rotation with knees apart to reduce right hip pain  Supine hamstring stretch on/off  Prone iliopsoas stretch left and right  Seated trunk rotation  Assessment    Therapist Assessment:  patient presents with chronic low back and right hip pain that has worsened over the last year. History of lumbar fusions, L4-5, L5-S1; has pain stimulator. No current HEP but does walk 2 miles daily if able.  Noted  poor ability to rotate trunk, tight myofascial structures throughout trunk and abdomen, limited scar mobility from fusions, weakness with gluteals and hip muscles.            Goals:  Patient goal (time reference required): reduce hip and back pain with walking and sitting .     Long term goal: Patient is to self-manage symptoms and return to prior function in 16 weeks.       Functional goals:   Patient is to be independent in their Home Exercise Program in 16 weeks.  Patient is to tolerate walking with normal gait with 1-2/10 pain up to 45 minutes in 16 weeks.   Patient is report the ability to sleep 4 hours without awakening due to pain in 16 weeks. Still has back pain but no hip pain  Patient is to display and maintain normal joint mobility/symmetry in the lumbar spine and pelvis to allow correct stabilization during walking and lifting in 16 weeks.  Patient is to demonstrate ability to sit for 60 minutes for car travel in 16 weeks.   Patient is to report decreased pain to allow decreased reliance on pain medications by 25% in 16 weeks.      Patient participated in goal selection and understand(s) the plan of care: Yes   Patient Potential for Achieving Desired Outcome:  Excellent    Response to Intervention:  tolerated well    Plan    Treatment Plan / Targeted Outcomes:     Frequency:   16 visits     Duration of Treatment: 8 weeks    Planned Interventions:  Possible physical therapy interventions include but are not limited to:   Home Exercise Program development  Therapeutic Exercise (ROM & Strengthening)  Manual Therapy  Neuromuscular Re-education  Ultrasound  Electrical Stimulation  Phonophoresis with Ketoprofen  Iontophoresis with Dexamethasone  Therapeutic Activities  Hot pack  Cold pack    Plan for next visit:  Likely dc next visit    Student or PTA has been instructed in and demonstrates skills necessary to carry out above stated treatment plan: Yes    Thank you for your referral to [facility].  Please call with any questions, concerns or comments.  [phone]

## 2017-12-28 NOTE — PROGRESS NOTES
Patient Information     Patient Name MRN Sex Km Wei [id number] Male 1955      Progress Notes by Shayla Shaw PT at 2017  8:18 AM     Author:  Shayla Shaw PT Service:  (none) Author Type:  PT- Physical Therapist     Filed:  2017 10:24 AM Date of Service:  2017  8:18 AM Status:  Signed     :  Shayla hSaw PT (PT- Physical Therapist)            [facility]  Outpatient PT - Daily Note        Date of Service: 2017   Visit #7    Patient Name: Km Freedman   YOB: 1955   Referring MD/Provider: Homar Flor MD  Diagnosis: sacroiliac joint pain  Treatment Diagnosis: low back pain, right hip pain, muscle weakness, myofascial tightness  Insurance: [employer]/Medicare  Start of Service: 17  Certification Dates: Start of Service: 17   Medicare/MA Re-Cert Due: 17      Subjective        Pain Ratin = Moderate Pain, (Aggravating, Grueling, Upsetting, Frustrating) / Location:  Right hip    Sore hip from sitting, trip to [place]. Slept in camper, was sore in the mornings, sharp pain in ischial tub. Pain now is on side of hip.    Objective  Postural loading:  General Listening:  Head:  symmetrical  Shoulders: R/R  Pelvis: R/R    + FFT right  Superior right pube    Today's Intervention:    Supine MET for right superior pubic shear    MFR- right lumbar plexus release, right hip inf/medial glide, log roll right hip    Home Exercise Program:  Hook lying trunk rotation with knees apart to reduce right hip pain  Supine hamstring stretch on/off  Prone iliopsoas stretch left and right  Seated trunk rotation  Assessment    Therapist Assessment:  patient presents with chronic low back and right hip pain that has worsened over the last year. History of lumbar fusions, L4-5, L5-S1; has pain stimulator. No current HEP but does walk 2 miles daily if able.  Noted poor ability to rotate trunk, tight myofascial structures throughout trunk  and abdomen, limited scar mobility from fusions, weakness with gluteals and hip muscles.            Goals:  Patient goal (time reference required): reduce hip and back pain with walking and sitting .     Long term goal: Patient is to self-manage symptoms and return to prior function in 16 weeks.       Functional goals:   Patient is to be independent in their Home Exercise Program in 16 weeks.  Patient is to tolerate walking with normal gait with 1-2/10 pain up to 45 minutes in 16 weeks.   Patient is report the ability to sleep 4 hours without awakening due to pain in 16 weeks. Still has back pain but no hip pain  Patient is to display and maintain normal joint mobility/symmetry in the lumbar spine and pelvis to allow correct stabilization during walking and lifting in 16 weeks.  Patient is to demonstrate ability to sit for 60 minutes for car travel in 16 weeks.   Patient is to report decreased pain to allow decreased reliance on pain medications by 25% in 16 weeks.      Patient participated in goal selection and understand(s) the plan of care: Yes   Patient Potential for Achieving Desired Outcome:  Excellent    Response to Intervention:  improved level of pubes    Plan    Treatment Plan / Targeted Outcomes:     Frequency:   16 visits     Duration of Treatment: 8 weeks    Planned Interventions:  Possible physical therapy interventions include but are not limited to:   Home Exercise Program development  Therapeutic Exercise (ROM & Strengthening)  Manual Therapy  Neuromuscular Re-education  Ultrasound  Electrical Stimulation  Phonophoresis with Ketoprofen  Iontophoresis with Dexamethasone  Therapeutic Activities  Hot pack  Cold pack    Plan for next visit:  MFR, MET, advance HEP if indicated    Student or PTA has been instructed in and demonstrates skills necessary to carry out above stated treatment plan: Yes    Thank you for your referral to [facility].  Please call with any questions, concerns or  comments.  [phone]

## 2017-12-28 NOTE — TELEPHONE ENCOUNTER
"Patient Information     Patient Name MRN Km Ronquillo [id number] Male 1955      Telephone Encounter by Janette Park RN at 2017  8:34 AM     Author:  Janette Park RN Service:  (none) Author Type:  NURS- Registered Nurse     Filed:  2017  8:39 AM Encounter Date:  2017 Status:  Signed     :  Janette Park RN (NURS- Registered Nurse)            Patient had his labs drawn 3 weeks early and Dr. Turner's last note on 17 states:  \"Plan  Aveed 750mg IM every 10 weeks with labs 8 weeks after every other injection (lab only appt) and will review the results at the following injection appt.  He is due for labs in 8 weeks (lab only appt)- T, HCT, PSA\"  Per Peter Turner MD no labs needed today.  Janette Park RN.........2017...8:35 AM         "
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
## 2017-12-28 NOTE — TELEPHONE ENCOUNTER
Patient Information     Patient Name MRN Sex Km Wei [id number] Male 1955      Telephone Encounter by Sobia Colbert at 2017 10:53 AM     Author:  Sobia Colbert Service:  (none) Author Type:  (none)     Filed:  2017 10:53 AM Encounter Date:  2017 Status:  Signed     :  Sobia Colbert            Patient coming for labs on 17. Please place orders

## 2017-12-28 NOTE — PATIENT INSTRUCTIONS
Patient Information     Patient Name MRN Sex Km Wei [id number] Male 1955      Patient Instructions by Francine Frye DO at 2017  4:00 PM     Author:  Francine Frye DO Service:  (none) Author Type:  PHYS- Osteopathic     Filed:  2017  4:25 PM Encounter Date:  2017 Status:  Signed     :  Francine Frye DO (PHYS- Osteopathic)            Pt should keep the area clean and dry: wash with plain soap and water. Keep covered. Does not need to put anything on the lesions. May use abx ointment if desires. Avoid lifting Over 5 #'s x1 week. No strenuous activity or hot tubs/sauna's/pool/lake x1 week. Ice and NSAID's for pain. Monitor for redness/drainage/swelling/increase in pain/temp > 101. May have serous drainage/should not be purulent. Call Clinic if these occur.  As far as heeling: may be red and firm for about 1-3 weeks. This is the normal heeling process and will smooth out to a silvery/white line. Avoid sun exposure X1 year. May take months for redness to dissipate. If is sun burnt, will stay red. Can use vit E oil.  Today we discussed wound care, activities, return to work, warnings signs. Pt is doing well.

## 2017-12-28 NOTE — TELEPHONE ENCOUNTER
Patient Information     Patient Name MRN Sex Km Wei [id number] Male 1955      Telephone Encounter by Sana Crowley at 2017  9:54 AM     Author:  Sana Crowley Service:  (none) Author Type:  (none)     Filed:  2017  9:55 AM Encounter Date:  2017 Status:  Signed     :  Sana Crowley Dr. would like a call from Dr. sim at her first opportunity.  Sana Crowley LPN.......................... 2017  9:54 AM

## 2017-12-28 NOTE — PROGRESS NOTES
Patient Information     Patient Name MRN Sex     Km Freedman [id number] Male 1955      Progress Notes by Shayla Shaw PT at 2017  8:54 AM     Author:  Shayla Shaw PT Service:  (none) Author Type:  PT- Physical Therapist     Filed:  2017  9:44 AM Date of Service:  2017  8:54 AM Status:  Signed     :  Shayla Shaw PT (PT- Physical Therapist)            [facility]  Outpatient PT - Daily Note        Date of Service: 2017   Visit #3    Patient Name: Km Freedman   YOB: 1955   Referring MD/Provider: Homar Flor MD  Diagnosis: sacroiliac joint pain  Treatment Diagnosis: low back pain, right hip pain, muscle weakness, myofascial tightness  Insurance: [employer]/Medicare  Start of Service: 17  Certification Dates: Start of Service: 17   Medicare/MA Re-Cert Due: 17      Subjective        Pain Ratin = Mild Pain, (Bothersome, Annoying, Irritating, Nagging) / Location:  Right hip    Had some hip pain, only lasted one day. Moving heavy items. Back was sore. Over did it. Ladder use.    Objective  Postural loading:  General Listening: right lumbar  Head:  symmetrical  Shoulders: R/R  Pelvis: R/R    Standing Flexion: right  Stork: right  Hip Drop:  Seated Flexion:   Supine:    Pubes: right suprior   ASIS: right superior   Leg length: right long  Prone:   PSIS:   Leg length:  Sacral Base:  IGNACIA Posterior/Inferior:      Positional Facet Diagnoses:   ERS Right ERS Left Neutral  SB R/Rot Left Neutral  SB L/Rot Right FRS Right FRS Left   T10-11         T11-12         T12-L1         L1-2         L2-3         L3-4         L4-5         L5-S1         (* ERS = extended/rotated/sidebent; FRS = flexed/rotated/sidebent)    Today's Intervention:    Muscle energy technique (MET) for superio right pubic shear  OSFM inferior parietocecal ligament  MFR lumbar paraspinals skin rolling,  right leg pull, right lumbar plexus  Instructed self  pubic correction    Home Exercise Program:  Hook lying trunk rotation with knees apart to reduce right hip pain  Supine hamstring stretch on/off  Prone iliopsoas stretch left and right  Seated trunk rotation  Assessment    Therapist Assessment:  patient presents with chronic low back and right hip pain that has worsened over the last year. History of lumbar fusions, L4-5, L5-S1; has pain stimulator. No current HEP but does walk 2 miles daily if able.  Noted poor ability to rotate trunk, tight myofascial structures throughout trunk and abdomen, limited scar mobility from fusions, weakness with gluteals and hip muscles.            Goals:  Patient goal (time reference required): reduce hip and back pain with walking and sitting .     Long term goal: Patient is to self-manage symptoms and return to prior function in 16 weeks.       Functional goals:   Patient is to be independent in their Home Exercise Program in 16 weeks.  Patient is to tolerate walking with normal gait with 1-2/10 pain up to 45 minutes in 16 weeks.  Patient is report the ability to sleep 4 hours without awakening due to pain in 16 weeks.   Patient is to display and maintain normal joint mobility/symmetry in the lumbar spine and pelvis to allow correct stabilization during walking and lifting in 16 weeks.  Patient is to demonstrate ability to sit for 60 minutes for car travel in 16 weeks.   Patient is to report decreased pain to allow decreased reliance on pain medications by 25% in 16 weeks.     Patient participated in goal selection and understand(s) the plan of care: Yes   Patient Potential for Achieving Desired Outcome:  Excellent    Response to Intervention:  improved loading through right pelvis, able to fire right gluteus medius 5/5 strength    Plan    Treatment Plan / Targeted Outcomes:     Frequency:   16 visits     Duration of Treatment: 8 weeks    Planned Interventions:  Possible physical therapy interventions include but are not limited to:    Home Exercise Program development  Therapeutic Exercise (ROM & Strengthening)  Manual Therapy  Neuromuscular Re-education  Ultrasound  Electrical Stimulation  Phonophoresis with Ketoprofen  Iontophoresis with Dexamethasone  Therapeutic Activities  Hot pack  Cold pack    Plan for next visit:  MFR, MET, advance HEP if indicated, upper thoracic spine assess    Student or PTA has been instructed in and demonstrates skills necessary to carry out above stated treatment plan: Yes    Thank you for your referral to [facility].  Please call with any questions, concerns or comments.  [phone]

## 2017-12-28 NOTE — PROGRESS NOTES
Patient Information     Patient Name MRN Sex Km Wei [id number] Male 1955      Progress Notes by Peter Turner MD at 2017  8:45 AM     Author:  Peter Turner MD Service:  (none) Author Type:  Physician     Filed:  2017  9:40 AM Encounter Date:  2017 Status:  Signed     :  Peter Turner MD (Physician)            Type of Visit  EST    Chief Complaint  Hypogonadism    HPI  Mr. Freedman is a 61 y.o. male who follows up with symptomatic hypogonadism.  Primary symptoms include low energy and poor sleep.  Symptoms started 5 years ago.  He previously failed patches and short acting injections required too many visits.    He started Aveed well over 1 year ago.  Overall his energy and some improved since being on the medication.  He recently had labs 2 weeks ago for monitoring T level and safety labs.    He does have some residual difficulty with sleep.  He has not been diagnosed with obstructive sleep apnea and he has not undergone testing.  He does state his primary care physician has recommended he consider testing.  I have echoed this recommendation.    He denies breast swelling or tenderness or acne.      Review of Systems  I reviewed the ROS with the patient today.    Nursing Notes:   Ade Becerril  2017  9:14 AM  Signed  Patient presents to the clinic for aveed injection.  Ade Becerril LPN........................2017  9:12 AM    Review of Systems:    Weight loss:    No     Recent fever/chills:  yes   Night sweats:   No  Current skin rash:  No   Recent hair loss:  No  Heat intolerance:  No   Cold intolerance:  yes  Chest pain:   No   Palpitations:   No  Shortness of breath:  No   Wheezing:   No  Constipation:    No   Diarrhea:   No   Nausea:   yes   Vomiting:   No   Kidney/side pain:  No   Back pain:   yes  Frequent headaches:  No   Dizziness:     No  Leg swelling:   No   Calf pain:    yes    Ade Becerril LPN........................2017  9:12 AM        Physical  Exam  Vitals:     09/07/17 0913   BP: 126/64   Pulse: 60   Weight: 76.7 kg (169 lb)      Constitutional: No acute distress.  Alert and cooperative   Head: NCAT  Eyes: Conjunctivae normal  Cardiovascular: Regular rate.  Pulmonary/Chest: Respirations are even and non-labored bilaterally, no audible wheezing  Abdominal: Soft. No distension, tenderness, masses or guarding.   Extremities: GILDARDO x 4, Warm. No clubbing.  No cyanosis.    Skin: Pink, warm and dry.  No visible rashes noted.  Back:  No left CVA tenderness.  No right CVA tenderness.  Genitourinary:  Nonpalpable bladder    Labs  CREATININE (mg/dL)    Date Value   08/31/2017 1.10     Results for TEJINDER JONES (MRN [id number]) as of 9/7/2017 09:27   8/2/2017 09:11   HEMATOCRIT                54.0 (H)   TESTOSTERONE,TOTAL 518.1     Results for TEJINDER JONES (MRN [id number]) as of 9/22/2016 09:15   3/7/2016 09:16   TESTOSTERONE,TOTAL 104.4 (L)     Results for TEJINDER JONES (MRN [id number]) as of 9/7/2017 09:37   9/7/2016 08:55 1/26/2017 08:33 8/2/2017 09:11   PSA TOTAL (DIAGNOSTIC) 0.739 0.867 0.875       Testosterone Injection  Today the patient received an injection of testosterone undecanoate 750mg.  This was given in the gluteus.  The results of this injection: None  Patient was monitored for 30 minutes following the injection.    Assessment  Hypogonadism- continue Aveed q10 weeks  He is stable.    Plan  Aveed 750mg IM every 10 weeks with labs 8 weeks after every other injection (lab only appt) and will review the results at the following injection appt.  He is due for labs in 18 weeks (lab only appt)- T, HCT

## 2017-12-28 NOTE — ADDENDUM NOTE
Patient Information     Patient Name MRN Sex Km Wei [id number] Male 1955      Addendum Note by Andres Flor MD at 2017  9:57 AM     Author:  Andres Flor MD Service:  (none) Author Type:  Physician     Filed:  2017  9:57 AM Encounter Date:  2017 Status:  Signed     :  Andres Flor MD (Physician)       Addended by: ANDRES FLOR on: 2017 09:57 AM        Modules accepted: Orders

## 2017-12-28 NOTE — PROGRESS NOTES
Patient Information     Patient Name MRN Sex Km Wei [id number] Male 1955      Progress Notes by Francine Frye DO at 2017  3:20 PM     Author:  Francine Frye DO Service:  (none) Author Type:  PHYS- Osteopathic     Filed:  2017  4:11 PM Encounter Date:  2017 Status:  Signed     :  Francine Frye DO (PHYS- Osteopathic)            Clinic Procedure Note      PMx, PSx, and social Hx are reviewed and updated in [facility]    Current Outpatient Prescriptions on File Prior to Visit       Medication  Sig Dispense Refill     lisinopril-hydrochlorothiazide (10-12.5 mg) tablet (PRINZIDE; ZESTORETIC) TAKE 1 TABLET BY MOUTH ONCE DAILY. 90 tablet 3     methadone (DOLOPHINE) 10 mg tablet 1 daily for pain, fill on/after 10/23/17 30 tablet 0     sertraline (ZOLOFT) 50 mg tablet Take 1 tablet by mouth once daily. 90 tablet 3     [START ON 2017] testosterone undecanoate (AVEED) 750 mg/3 mL (250 mg/mL) soln Inject 750 mg intramuscular one time for 1 dose. 3 mL 0     triamcinolone (ARISTOCORT; KENALOG) 0.1 % cream Apply  topically to affected area(s) 3 times daily. 60 g 3     Current Facility-Administered Medications on File Prior to Visit          Medication  Dose Route Frequency Provider Last Rate Last Dose     [START ON 2017] testosterone undecanoate 750 mg soln  750 mg Intra-Muscular one time Peter Turner MD           No Known Allergies      Orders Only on 2017      Component  Date Value     TSH 2017 8.26*     T3,TOTAL 2017 98.91      T4,FREE 2017 0.74    Office Visit on 2017      Component  Date Value     TESTOSTERONE,TOTAL 2017 518.1      HEMATOCRIT                2017 54.0*     PSA TOTAL (DIAGNOSTIC) 2017 0.875      SODIUM 2017 136      POTASSIUM 2017 4.2      CHLORIDE 2017 100      CO2,TOTAL 2017 28      ANION GAP 2017 8      GLUCOSE 2017 94      CALCIUM 2017 9.5      BUN 2017 19       CREATININE 08/02/2017 1.05      BUN/CREAT RATIO           08/02/2017 18      GFR if  08/02/2017 >60      GFR if not  [employer]* 08/02/2017 >60      LYME SCREEN W/REFLEX DOMINICK* 08/02/2017 Negative      TSH 08/02/2017 7.64*     CK,TOTAL 08/02/2017 293*     6-MONOACETYL MORPHINE 08/02/2017 NEG      AMPHETAMINE URINE 08/02/2017 NEG      BARBITURATE URINE 08/02/2017 NEG      BENZODIAZEPINE URINE 08/02/2017 NEG      BUPRENORPHRINE URINE 08/02/2017 NEG      COCAINE METAB URINE 08/02/2017 NEG      ETHYLGLUCURONIDE URINE 08/02/2017 NEG      FENTANYL URINE 08/02/2017 NEG      METHADONE URINE 08/02/2017 POS*     OPIATES URINE 08/02/2017 NEG      OXYCODONE URINE 08/02/2017 NEG      PROPOXYPHENE URINE 08/02/2017 NEG      THC 50 URINE 08/02/2017 NEG      TRAMADOL 08/02/2017 NEG      PH URINE 08/02/2017 6.9      CREAT UR 08/02/2017 122      MASS SPECTROMETRY URINE 08/02/2017 See Below    Orders Only on 06/15/2017      Component  Date Value     TESTOSTERONE,TOTAL 06/15/2017 421.5      HEMATOCRIT                06/15/2017 51.1          Any imagining associated with this vist was reviewed.      Indication  Km Freedman is seen at the request of the Andres Flor MD.  Km Freedman  presents for lesion removal. We have discussed this procedure, including option  of not performing surgery, technique of surgery and potential for  bleeding/infection/scarring/need for removal of more tissue and post-procedural care.  Patient is able to do post procedural dressing changes and understands what is  expected.    OBJECTIVE:    Km Freedman appears well. Vitals are normal. The patient has no allergies to lidocaine or  suture material. The patient not on any blood thinners.  Informed consent was obtained prior to beginning the procedure. The area was marked  and doubled checked/ID ed with the pt. PAUSE for the CAUSE completed. The risks of  lesion removal include but are not limited to: bleeding, infection,  scarring, reoccurrence,  and the need for removal of more tissue, and complications of anesthesia.    Location  ASSESSMENT: right gastrocnemius muscle biopsy   Location: right     Procedural Sedation  1% lidocaine w/ Epinephrine  20cc    Technique  Patient appears well. Vitals are normal. The patient has no allergies to lidocaine or  suture material. The patient not on any blood thinners.  Km Freedman has no history of keloids or problems with healing in the past.    Skin: see HPI    Informed consent was obtained prior to beginning the procedure. The area was marked  and doubled checked/ID ed with the pt. PAUSE for the CAUSE completed. The risks of  lesion removal include but are not limited to: bleeding, infection, scarring, reoccurrence,  and the need for removal of more tissue, and complications of anesthesia.  After informed consent was obtained, using Chloroprep for cleansing and 1%   Lidocaine w/ epi for anesthetic; using sterile technique, PROCEDURE:R gastrocnemius  muscle biopsy  was performed.    The incision was 2Cm long. [employer] /[employer]'s  Are used for retraction.  The fascia is visualized and incised w/ Metzenbaum's.   The muscle is grasped w/ a Kocker and a 0.5x3xm piece of muscle is excised.  The fascia is closed w/ 3-0 running vicryl and the deep tissue as well.   Closure:2 layer closure w/ vircyl and nylon of  simple single interrupted stitches.     An Antibiotic salve and steriledressing is applied, and wound care instructions provided. The procedure was well tolerated without complications.    Plan:  The patient will follow up in 7- 10  days for suture removal and review of pathology. If thereis any bleeding/redness/drainage/swelling/pain or tenderness- call the clinic. Patient  was given instructions in wound care, acivity, warning signs, appointment for follow up.  If the patient has any questions or concerns, should call our clinic or go to ER/urgent  care after hours. Patient expressed  understanding in directions for care, and was given a  written copy of instructions.    Rx=see EPIC    Pt tolerated the procedure well without complications  Patient was given instruction in activity restrictions, wound care and dressing changes. Medication usage, diet, warning signs to look for (and what to do if they occur), and an appointment for follow-up.      Caring for Your Incision      You ll need to help care for your incision after surgery and certain medical procedures. To close an incision, your healthcare provider used stitches (sutures), special strips of surgical tape called Steri-Strips, surgical staples, or surgical skin glue. Follow the tips on this sheet to help stop bleeding, speed healing, and prevent infection of your incision.      Types of incision closures    Surgical stitches (sutures) are placed by sewing the edges of an incision together with surgical thread. Sutures are either absorbable or non-absorbable. Absorbable sutures break down in the body over time. Non-absorbable sutures need to be removed.    Home care  Always wash your hands before touching your incision.  Keep the incision clean, dry, and out of water, keep the incision out of water.  Do not to pick at the scabs. Scabs help protect the wound.  You can take a shower in 24 hours and wash the incision with soap and water. Pat dry/don t scrub. It s OK to wash around the incision. But don t spray water directly on it.  Pat stitches dry if they get wet. Don't rub.  Check the incision site daily for pain, redness, drainage, swelling, or separation of the incision edges.  If there is a bandage (dressing) over the incision, leave the dressing in place until you are told to remove it or change it. Using clean hands change the dressing as directed by your healthcare provider. Always wash your hands before changing your dressing.  Make sure any clothing that touches the incision is loose-fitting. This will prevent rubbing.   Try to  avoid from rough play, contact sports, or physical activities. This can put you at risk of opening the incision.  Make sure you avoid doing things that could cause dirt or sweat to get in or on the incision.  As your incision heals, the skin may appear pink or red. It may also feel slightly bumpy or raised. This is called a healing ridge. Over time, the color should fade and the raised skin will become less noticeable.    Care for specific closures  Follow these guidelines unless your healthcare provider tells you otherwise:  Sutures or staples. Once you no longer need to keep these dry, clean the incision or wound daily. First remove the bandage using clean hands. Then wash the area gently with soap and warm water. Use a wet cotton swab to loosen and remove any blood or crust that forms. After cleaning, put a thin layer of antibiotic ointment on. Then put on a new bandage.      Pain Control    Use ice!  Ice keeps the swelling down and swelling is what causes pain.  Never apply ice directly to the skin.  Wrap it in a towel or cloth.  Apply ice 20 minutes on and 20 minutes off for pain control.  Use as needed.    Take tylenol 325 mg by mouth with food every 4 hours as needed for pain. Or ibuprofen 400 mg by mouth with food every 4 hours as needed for pain.  Do not take tylenol if you have a history of heavy drinking , hepatits C or liver problems.  Do not take ibuprofen if you have a history of stomach ulcers/problems, bleeding problem or kidney issues.           Follow-up care      Follow up with your healthcare provider to ask how long sutures or staples should be left in place. Be sure to return for suture or staple removal as directed. If dissolving stitches were used in your mouth, these will not need to be removed. They should fall out or dissolve on their own.  If tape closures were used, remove them yourself when your healthcare provider tells you to if they have not fallen off on their own. If skin glue was  used to close your incision, the glue will wear off by itself.    When to seek medical care  Call your healthcare provider right away if you has any of these:  More pain, redness, swelling, bleeding, or foul-smelling discharge around the incision area  Fever of 101 F (38.3 C) or higher, or as directed by your  healthcare provider  Shaking chills  Vomiting or nausea that doesn t go away  Numbness, coldness, or tingling around the incision area, or changes in skin color  Opening of the sutures or wound  Stitches or staples come apart or fall out or surgical tape falls off before 7 days, or as directed by your healthcare provider

## 2017-12-28 NOTE — TELEPHONE ENCOUNTER
Patient Information     Patient Name MRN Sex Km Wei [id number] Male 1955      Telephone Encounter by Mohini Prado at 2017 10:42 AM     Author:  Mohini Prado Service:  (none) Author Type:  (none)     Filed:  2017 10:44 AM Encounter Date:  2017 Status:  Signed     :  Mohini Prado            Scheduling inquiring about a muscle biopsy per surgery.  Wondering if this should be scheduled as a procedure or a consult.  Scheduling will notify patient that this will be a consult.  Mohini Praod LPN..........2017  10:44 AM

## 2017-12-28 NOTE — PROGRESS NOTES
Patient Information     Patient Name MRN Sex Km Wei [id number] Male 1955      Progress Notes by Shayla Shaw PT at 2017  8:51 AM     Author:  Shayla Shaw PT Service:  (none) Author Type:  PT- Physical Therapist     Filed:  2017  9:36 AM Date of Service:  2017  8:51 AM Status:  Signed     :  Shayla Shaw PT (PT- Physical Therapist)            [facility]  Outpatient PT - Daily Note        Date of Service: 2017   Visit #4    Patient Name: Km Freedman   YOB: 1955   Referring MD/Provider: Homar Flor MD  Diagnosis: sacroiliac joint pain  Treatment Diagnosis: low back pain, right hip pain, muscle weakness, myofascial tightness  Insurance: [employer]/Medicare  Start of Service: 17  Certification Dates: Start of Service: 17   Medicare/MA Re-Cert Due: 17      Subjective        Pain Ratin = Mild Pain, (Bothersome, Annoying, Irritating, Nagging) / Location:  Right hip    Lower legs was really sore last night, little sleep, ache, not sure if it's restless leg, has been told he has this. Hip is still doing better.     Objective  Postural loading:  General Listening: left chest/upper back  Head:  symmetrical  Shoulders: R/R  Pelvis: symmetrical    ERS left T5, T3 noted    Today's Intervention:    Seated MET ERS left T5, T3  MFR-  Right vertebral pericardial ligament  Hook lying trunk rotation with arms    Home Exercise Program:  Hook lying trunk rotation with knees apart to reduce right hip pain  Supine hamstring stretch on/off  Prone iliopsoas stretch left and right  Seated trunk rotation  Assessment    Therapist Assessment:  patient presents with chronic low back and right hip pain that has worsened over the last year. History of lumbar fusions, L4-5, L5-S1; has pain stimulator. No current HEP but does walk 2 miles daily if able.  Noted poor ability to rotate trunk, tight myofascial structures throughout  trunk and abdomen, limited scar mobility from fusions, weakness with gluteals and hip muscles.            Goals:  Patient goal (time reference required): reduce hip and back pain with walking and sitting .     Long term goal: Patient is to self-manage symptoms and return to prior function in 16 weeks.       Functional goals:   Patient is to be independent in their Home Exercise Program in 16 weeks.  Patient is to tolerate walking with normal gait with 1-2/10 pain up to 45 minutes in 16 weeks.   Patient is report the ability to sleep 4 hours without awakening due to pain in 16 weeks. Still has back pain but no hip pain  Patient is to display and maintain normal joint mobility/symmetry in the lumbar spine and pelvis to allow correct stabilization during walking and lifting in 16 weeks.  Patient is to demonstrate ability to sit for 60 minutes for car travel in 16 weeks.   Patient is to report decreased pain to allow decreased reliance on pain medications by 25% in 16 weeks.      Patient participated in goal selection and understand(s) the plan of care: Yes   Patient Potential for Achieving Desired Outcome:  Excellent    Response to Intervention:  improved loading through right shoulder    Plan    Treatment Plan / Targeted Outcomes:     Frequency:   16 visits     Duration of Treatment: 8 weeks    Planned Interventions:  Possible physical therapy interventions include but are not limited to:   Home Exercise Program development  Therapeutic Exercise (ROM & Strengthening)  Manual Therapy  Neuromuscular Re-education  Ultrasound  Electrical Stimulation  Phonophoresis with Ketoprofen  Iontophoresis with Dexamethasone  Therapeutic Activities  Hot pack  Cold pack    Plan for next visit:  MFR, MET, advance HEP if indicated    Student or PTA has been instructed in and demonstrates skills necessary to carry out above stated treatment plan: Yes    Thank you for your referral to [facility].  Please call with any  questions, concerns or comments.  [phone]
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
## 2017-12-29 NOTE — DISCHARGE SUMMARY
Patient Information     Patient Name MRN Sex Km Wei [id number] Male 1955      Discharge Summaries by Shayla Shaw PT at 2017 11:14 AM     Author:  Shayla Shaw PT Service:  (none) Author Type:  PT- Physical Therapist     Filed:  2017 11:58 AM Date of Service:  2017 11:14 AM Status:  Signed     :  Shayla Shaw PT (PT- Physical Therapist)            [facility]  Outpatient PT - Daily Note/Discharge Summary        Date of Service: 2017   Visit #9    Patient Name: Km Freedman   YOB: 1955   Referring MD/Provider: Homar Flor MD  Diagnosis: sacroiliac joint pain  Treatment Diagnosis: low back pain, right hip pain, muscle weakness, myofascial tightness  Insurance: [email or web address]  Start of Service: 17  Certification Dates: Start of Service: 17   Medicare/MA Re-Cert Due: 17      Subjective        Pain Ratin = Moderate Pain, (Aggravating, Grueling, Upsetting, Frustrating) / Location:  Legs and back  0/10 right hip    Not feeling bad today. Sore in am. Able to sit and lay on hip more. Pain is shorter lasting. Low back has been hurting the last two nights. Takes less tylenol overall. Takes one Methadone a day. No change with that.     Subjective rating of current functional limitations:     Functional Activity No Difficulty Mild Difficulty Mod. Difficulty Severe Difficulty   Sleeping       X sleeps 2-3 hours at a time, due to back not hip   Walking   x       Lifting/Carrying    x      Bending x         Sitting/Driving    1 Hour      x    Work Activities              Patient Specific Functional and Pain Scales (PSFS):    Clinician Instructions: Complete after the history and before the exam.    Initial Assessment: We want to know what 3 activities in your life you are unable to perform, or are having the most difficulty performing, as a result of your chief problem. Please list and score at least 3  activities that you are unable to perform, or having the most difficulty performing, because of your chief problem.   Patient Specific Activity Scoring Scheme (score one number for each activity):   Activity Score (0-10)  0= Unable to perform activity  10= Able to perform activity at same level as before injury or problem   1. Prolonged sitting 6/10   2. Walking 2 miles 7/10   3. Laying on either hip for sleep 6/10   4.  /10   5.  /10   Totals:  19/30 = 63 % ability which relates to 37% impairment    Patient verbally states that they understand that the information they have provided above is current and complete to the best of their knowledge.    Patient Specific Functional Scale Modifier Scale Conversion: (patient's modifier that correlates with pt's score on PSFS): 7-CJ (30% Impaired).    G codes and Modifier taken from patient completing the PSFS:   Initial Primary G Code and Modifier:    Per the Patient's intake and/or assessment the Primary G Code is: Body Position .   The Patient's Impairment, Limitation or Restriction Modifier would be best described as: CJ - 20% - 40% Impairment.   Goal Primary G Code and Modifier:    The Patient's G Code Goal would be: Body Position    The Patient's Impairment, Limitation or Restriction Modifier goal would be best described as: CJ - 20% - 40% Impairment.   Discharge Primary G Code and Modifier:    The Patient's status upon Discharge is Body Position    The Patient's Impairment, Limitation or Restriction Modifier would be best described as CJ - 20% - 40% Impairment.     Objective  Postural loading:  General Listening:right hip  Head:  symmetrical  Shoulders: L/R  Pelvis: symmetrical    -FFT    Equal trunk rotationin sitting 60 degrees.  Today's Intervention:      MFR-  Right hip joint inf/medial glide, right lumbar plexus release, log roll right and left hip, piriformis stretch    Reviewed HEP    Home Exercise Program:  Hook lying trunk rotation with knees  apart to reduce right hip pain  Supine hamstring stretch on/off  Prone iliopsoas stretch left and right  Seated trunk rotation  Assessment    Therapist Assessment:  patient presents with chronic low back and right hip pain that has worsened over the last year. History of lumbar fusions, L4-5, L5-S1; has pain stimulator. No current HEP but does walk 2 miles daily if able.  Noted poor ability to rotate trunk, tight myofascial structures throughout trunk and abdomen, limited scar mobility from fusions, weakness with gluteals and hip muscles.          6/29/17 Patient has progressed with PT with manual therapy work to reduce myofascial restrictions. Patient no longer has constant right hip pain. When pain increases, it is short lasting. Has improved tolerance for sitting, walking, laying on hips. Chronic back and leg pain continues.     Goals:  Patient goal (time reference required): reduce hip and back pain with walking and sitting .goal met     Long term goal: Patient is to self-manage symptoms and return to prior function in 16 weeks.       Functional goals:   Patient is to be independent in their Home Exercise Program in 16 weeks.goal met  Patient is to tolerate walking with normal gait with 1-2/10 pain up to 45 minutes in 16 weeks. Pain is still 4-5 /10 due to back pain, but hip has improved  Patient is report the ability to sleep 4 hours without awakening due to pain in 16 weeks. Still has back pain but no hip pain  Patient is to display and maintain normal joint mobility/symmetry in the lumbar spine and pelvis to allow correct stabilization during walking and lifting in 16 weeks. Goal met  Patient is to demonstrate ability to sit for 60 minutes for car travel in 16 weeks.  Goal met  Patient is to report decreased pain to allow decreased reliance on pain medications by 25% in 16 weeks. Goal met     Patient participated in goal selection and understand(s) the plan of care: Yes   Patient Potential for Achieving  Desired Outcome:  Excellent    Response to Intervention:  tolerated well    Plan    Treatment Plan / Targeted Outcomes:     Frequency:   16 visits     Duration of Treatment: 8 weeks    Planned Interventions:  Possible physical therapy interventions include but are not limited to:   Home Exercise Program development  Therapeutic Exercise (ROM & Strengthening)  Manual Therapy  Neuromuscular Re-education  Ultrasound  Electrical Stimulation  Phonophoresis with Ketoprofen  Iontophoresis with Dexamethasone  Therapeutic Activities  Hot pack  Cold pack    Plan for next visit:  PT discontinued, patient will continue with established HEP.    Student or PTA has been instructed in and demonstrates skills necessary to carry out above stated treatment plan: Yes    Thank you for your referral to [facility].  Please call with any questions, concerns or comments.  [phone]

## 2017-12-29 NOTE — PATIENT INSTRUCTIONS
Patient Information     Patient Name MRN Sex Km Wei [id number] Male 1955      Patient Instructions by Francine Frye DO at 2017  3:20 PM     Author:  Francine Frye DO Service:  (none) Author Type:  PHYS- Osteopathic     Filed:  2017  3:58 PM Encounter Date:  2017 Status:  Signed     :  Francine Frye DO (PHYS- Osteopathic)            Caring for Your Incision      You ll need to help care for your incision after surgery and certain medical procedures. To close an incision, your healthcare provider used stitches (sutures), special strips of surgical tape called Steri-Strips, surgical staples, or surgical skin glue. Follow the tips on this sheet to help stop bleeding, speed healing, and prevent infection of your incision.      Pain Control    Use ice!  Ice keeps the swelling down and swelling is what causes pain.  Never apply ice directly to the skin.  Wrap it in a towel or cloth.  Apply ice 20 minutes on and 20 minutes off for pain control.  Use as needed.    Take tylenol 325 mg by mouth with food every 4 hours as needed for pain. Or ibuprofen 400 mg by mouth with food every 4 hours as needed for pain.  Do not take tylenol if you have a history of heavy drinking , hepatits C or liver problems.  Do not take ibuprofen if you have a history of stomach ulcers/problems, bleeding problem or kidney issues.     Types of incision closures    Surgical stitches (sutures) are placed by sewing the edges of an incision together with surgical thread. Sutures are either absorbable or non-absorbable. Absorbable sutures break down in the body over time. Non-absorbable sutures need to be removed.    Home care  Always wash your hands before touching your incision.  Keep the incision clean, dry, and out of water, keep the incision out of water.  Do not to pick at the scabs. Scabs help protect the wound.  You can take a shower in 24 hours and wash the incision with soap and water. Pat  dry/don t scrub. It s OK to wash around the incision. But don t spray water directly on it.  Pat stitches dry if they get wet. Don't rub.  Check the incision site daily for pain, redness, drainage, swelling, or separation of the incision edges.  If there is a bandage (dressing) over the incision, change this every 24 hours as instructed by your provider. Using clean hands change the dressing as directed by your healthcare provider. Always wash your hands before changing your dressing.  Make sure any clothing that touches the incision is loose-fitting. This will prevent rubbing. If the incision is on the head, keep your child from wearing caps or other head coverings. These may rub against the incision.  Try to avoid from rough play, contact sports, or physical activities for two weeks. This can put you at risk of opening the incision.  Make sure you avoid doing things that could cause dirt or sweat to get in or on the incision.  As your incision heals, the skin may appear pink or red. It may also feel slightly bumpy or raised. This is called a healing ridge. Over time, the color should fade and the raised skin will become less noticeable.    Care for specific closures  :  Sutures or staples. Once you no longer need to keep these dry, clean the incision or wound daily, generally after the first 24 hours.  First remove the bandage using clean hands. Then wash the area gently with soap and warm water. Use a wet cotton swab to loosen and remove any blood or crust that forms. After cleaning, put a thin layer of antibiotic ointment on. Then put on a new bandage.      Follow-up care  Staples or sutures generally need to be removed in 7-10 days.     Be sure to return for suture or staple removal as directed. If dissolving stitches were used in your mouth, these will not need to be removed. They should fall out or dissolve on their own.  If tape closures were used, remove them yourself when your healthcare provider tells you  to if they have not fallen off on their own.     When to seek medical care  Call your healthcare provider right away if you have any of these:  More pain, redness, swelling, bleeding, or foul-smelling discharge around the incision area  Fever of 101 F (38.3 C) or higher, or as directed by your child's healthcare provider  Shaking chills  Vomiting or nausea that doesn t go away  Numbness, coldness, or tingling around the incision area, or changes in skin color  Opening of the sutures or wound  Stitches or staples come apart or fall out or surgical tape falls off before 7 days, or as directed by your healthcare provider

## 2017-12-29 NOTE — PATIENT INSTRUCTIONS
Patient Information     Patient Name MRN Km Ronquillo [id number] Male 1955      Patient Instructions by Greta Lane NP at 2017 12:34 PM     Author:  Greta Lane NP  Service:  (none) Author Type:  PHYS- Nurse Practitioner     Filed:  2017 12:34 PM  Encounter Date:  2017 Status:  Addendum     :  Greta Lane NP (PHYS- Nurse Practitioner)        Related Notes: Original Note by Greta Lane NP (PHYS- Nurse Practitioner) filed at 2017 12:34 PM               Index [place] Related topics   Tick Bite   ________________________________________________________________________  KEY POINTS    Ticks are small bugs that feed on the blood of animals, birds, and people. If you find a tick attached to your skin, you have been bitten. You usually will not feel anything when a tick bites you, but you may have a little redness around the bite.    You need to remove the tick yourself or get help from your healthcare provider. Save the tick in case you later start having symptoms of disease and need to know what kind of tick bit you. Check for a rash and other symptoms for about 4 weeks after the bite.    When you are outdoors, wear long-sleeved shirts tucked into your pants. Wear your pants tucked into your socks or boot tops if possible. Use approved tick repellents on exposed skin and clothing.  ________________________________________________________________________  What are ticks?  Ticks are small bugs that feed on the blood of animals, birds, and people. There are many different kinds of ticks. Black-legged ticks, or deer ticks, are usually no bigger than the head of a pin. Wood and dog ticks are usually much larger. They may be about a quarter of an inch before feeding and half an inch when they are full of blood.  Ticks are found in woodlands, grasslands, and marshlands and at the seashore. Wild birds and animals, as well as domestic animals and pets such as dogs, horses, and  cows, can carry ticks. Ticks may climb on humans from animals, leaves, or low-lying brush. They may fall from tree leaves, especially on a windy day. Ticks cannot jump or fly.  How do I know if I have been bitten by a tick?  If you find a tick attached to your skin, you have been bitten. You usually will not feel anything when a tick bites you, but you may have a little redness around the bite.  Can I get sick from a tick bite?  There is little risk from the bite of a tick most of the time. However, some ticks carry infections that can be passed to people. For example:    Deer tick bites may cause Lyme disease. The symptoms of Lyme disease include a red, round rash that starts at the site of the bite and gets bigger. Over time, a similar rash may appear in other parts of the body. In some cases, the rash looks like a bulls-eye or target on your skin. Some people have a rash without other symptoms. If you do have other symptoms, they may include feeling very tired, headache, muscle aches, joint pain or swelling, and a fever.    Wood tick or dog tick bites may cause [place] spotted fever (RMSF). RMSF may first cause fever, headache, muscle aches, joint pain or swelling, and then a pink or red spotted rash. You need to get treatment right away if you have these symptoms and have had a tick bite. RMSF is seen in most states, not just the [place] areas.  Tick bites may cause other diseases as well.  How are tick bites treated?  If you find a tick attached to your body, you need to remove it. You can remove it yourself or get help from your healthcare provider. To remove an attached tick:    Grasp the tick with tweezers or fingers (covered with gloves or a tissue) as close to the skin as possible.    Gently pull the tick straight away from you until it releases its hold. Use a slow gentle pulling motion. Pulling the tick out too quickly may tear the body from the mouth, leaving the mouth still in the skin.  If you are unable to remove the tick completely, you may need to see your healthcare provider.    Do not twist the tick as you pull, and try not to squeeze its body. Squeezing or crushing the tick could force infected fluids from the tick into the site of the bite.  After you have removed the tick, thoroughly wash your hands and the bite area with soap and water. Put an antiseptic such as rubbing alcohol on the area where you were bitten.  Infected ticks usually do not spread an infection until after the tick has been attached and feeding on your blood for several hours. Check for a rash and other symptoms for about 4 weeks after the bite.  Save the tick in case you later start having symptoms of disease and need to know what kind of tick bit you. Put the tick in a sealed plastic bag and keep it in the freezer. Identification of the tick may help your provider diagnose and treat your symptoms. If you do not have any symptoms of disease after 1 month, you can throw the tick away.  How can I help prevent tick bites?    In areas of thick underbrush, try to stay near the center of trails.    When you are outdoors, wear long-sleeved shirts tucked into your pants. Wear your pants tucked into your socks or boot tops if possible. A hat may help, too. Wearing light-colored clothing may make it easier to spot a small tick before it reaches your skin and bites. While you are outside, check for ticks every 4 hours and remove any ticks on clothing or exposed skin.    Use approved tick repellents on exposed skin and clothing. Don't use more repellent than recommended in the package directions. Don't put repellent on open wounds or rashes. When using sprays, don t spray the repellent directly on your face. Spray the repellent on your hands first and then put it on your face, but not near your eyes or mouth. Then wash the spray off your hands.    Adults should use products with no more than 35% DEET. Children older than 2 months  can use repellents with no more than 30% DEET. DEET should be applied just once a day. Wash it off your body when you go back indoors. Some products contain more than 35% DEET. The higher concentrations are no more effective than the lower concentrations, but they may last longer. Read the label carefully before applying.    Picaridin may irritate the skin less than DEET and appears to be just as effective.    Spray clothes with repellents because insects may crawl from clothing to the skin or bite through thin clothing. Products containing permethrin are recommended for use on clothing, shoes, bed nets, and camping gear. Permethrin-treated clothing repels and kills ticks, mosquitoes, and other insects and can keep working after laundering. Permethrin should be reapplied to clothing according to the instructions on the product label. You can buy clothing and hats pretreated with permethrin. Permethrin does not work as a repellent when it is put on the skin.    Treat household pets for ticks and fleas. Check pets after they have been outdoors.    Brush off clothing and pets before entering the house.    After you have been outdoors, undress and check your body for ticks. They usually crawl around for several hours before biting. Check your clothes, too. Wash them right away to remove any ticks.    Shower and shampoo after your outing.    Inspect any gear you have carried outdoors.    If you spend much time hiking, you may want to include a pair of tick tweezers in your first-aid kit. You can buy them at sporting goods stores.  Developed by [employer].  Adult Advisor 2016.3 published by [employer].  Last modified: 2016-04-04  Last reviewed: 2016-03-31  This content is reviewed periodically and is subject to change as new health information becomes available. The information is intended to inform and educate and is not a replacement for medical evaluation, advice, diagnosis or treatment by a healthcare  professional.  References   Adult Advisor 2016.3 Index    Copyright   2016 [facility], a division of [employer] All rights reserved.        Index [place] Related topics   Lyme Disease   ________________________________________________________________________  KEY POINTS    Lyme disease is an infection caused by the bite of a blacklegged tick (also called deer tick) that is infected with the bacteria.    Lyme disease is treated with antibiotics. In most cases, the symptoms go away a few weeks or months after antibiotic treatment, but sometimes the symptoms last several years.    Follow the full course of treatment prescribed by your healthcare provider. Do not stop taking antibiotics because you start to feel better or your symptoms go away.    Wear long-sleeved shirts and long pants and use insect repellant to avoid ticks. If you find a tick attached to your body, you need to remove it.  ________________________________________________________________________  What is Lyme disease?  Lyme disease is an infection caused by the bite of a blacklegged tick (also called deer tick) infected with bacteria. The tick is so small that you may not notice the tick or its bite. Most deer ticks do not carry Lyme disease. Even if a tick is infected, it may not transfer the disease to you. An infected tick that is attached for less than 36 hours is less likely to cause Lyme disease.  If the disease is not diagnosed and treated, the symptoms can last for several years, but they usually get better over time.  What is the cause?  Ticks are found in woods, forests, grasslands, and marshlands and at the seashore. Wild birds and animals, as well as domestic animals and pets such as dogs, horses, and cows, can carry ticks. Ticks may climb on humans from animals, leaves, or low-lying brush. Ticks cannot jump or fly.  People usually become infected during the summer when they are more likely to be exposed to ticks. [place],  campers, hunters, and people living in wooded or rural areas have a higher risk for Lyme disease. In the [place], the infection is more common in the northern states.  What are the symptoms?  Lyme disease is hard to diagnose because its symptoms can vary greatly from person to person. The first symptoms may not even be noticed. Symptoms may come and go in cycles lasting a week or longer.  Untreated Lyme disease may progress through these 3 stages:  Stage 1:  In the first month after a bite by an infected tick, you may get a rash at the site of your bite. The rash begins as a large red spot that may be flat or bumpy. The area of the rash feels warm, but it is not painful or itchy. The rash slowly spreads and the red color at the center of the rash may fade, creating what is called a bull's-eye rash. Sometimes the rash may blister or scab in the center. The thigh, groin, and armpit are common sites for the rash, but it can appear anywhere.  Although most infected people develop a rash, you may not have this symptom, or you may overlook it.  You may feel like you have the flu, with symptoms such as:    Feeling very tired or drowsy    Pain or stiffness in muscles and joints    Headache or jaw pain    Chills and fever    Stiff neck  Less common symptoms of early Lyme disease are:    Red, irritated eyes    Sore throat and cough    In men, swelling of the testicles  Even if you don't get treatment, the early symptoms usually improve or go away within several weeks. However, you may feel tired, drowsy, and have muscle or joint pain for months after the rash has gone.  Stage 2:  If not treated, Lyme disease can spread to your brain, heart, and joints. Several weeks to months after the first symptoms appear, you may develop:    Weakness or paralysis of one or both sides of your face    Fever, headache, and a stiff neck    Heart problems, such as an irregular heartbeat    Confusion    Seizures    Coma  During this second stage, you  may have pain in your joints, tendons, muscles, or bones, usually without joint swelling. These symptoms usually go away within a few weeks.  Stage 3:  After several months of infection, you may have:    Joint pain and swelling    Numbness or tingling in your hands and feet    Trouble concentrating    Weakness in your arms or legs    Depression  How is it diagnosed?  Your healthcare provider will ask about your symptoms and medical history and examine you. You may have a blood test for Lyme disease. Or you and your provider may decide to start treatment without the test or before the test results are available.  If you were recently bitten by a tick, saving the tick in a marked plastic bag in your freezer may help your provider diagnose your symptoms and decide on treatment.  How is it treated?  Lyme disease is treated with antibiotics. In most cases, the symptoms go away a few weeks or months after antibiotic treatment, but sometimes the symptoms last several years.   If you are in stages 2 or 3 of the disease, you may need other treatments if you have symptoms that affect your heart, nervous system, or joints.  If you are pregnant or nursing and have Lyme disease, you may pass the disease to your baby. Although this happens rarely, you should call your healthcare provider right away if you are pregnant or nursing and are bitten by a tick or have symptoms of Lyme disease.  How can I take care of myself?  Follow the full course of treatment prescribed by your healthcare provider. You need to take all of your antibiotic medicine. Do not stop taking antibiotics because you start to feel better or your symptoms go away. Ask your healthcare provider:    How long it will take to recover    If there are activities you should avoid and when you can return to your normal activities    How to take care of yourself at home    What symptoms or problems you should watch for and what to do if you have them  Make sure you know  when you should come back for a checkup.  How can I help prevent Lyme disease?    In areas of thick underbrush, try to stay near the center of trails.    When you are outdoors, wear long-sleeved shirts tucked into your pants. Wear your pants tucked into your socks or boot tops if possible. A hat may help, too. Wearing light-colored clothing may make it easier to spot a small tick before it reaches your skin and bites. While you are outside, check for ticks every 4 hours and remove any ticks on clothing or exposed skin.    Use approved tick repellents on exposed skin and clothing. Don t use more than recommended in the package directions. Do not put repellent on open wounds or rashes. When using sprays, don t spray the repellent directly on your face. Spray the repellent on your hands first and then put it on your face, but not near your eyes or mouth. Then wash the spray off your hands.    Adults should use repellent products with no more than 35% DEET. Children older than 2 months can use repellents with no more than 30% DEET. Don t put DEET on a child s hand or other body part they are likely to put in their mouth. DEET should be applied just once a day. Wash it off your body when you go back indoors. Some products contain more than 35% DEET. The higher concentrations are no more effective than the lower concentrations, but they may last longer. Read the label carefully before applying.    Picaridin may irritate the skin less than DEET and appears to be just as effective.    Spray clothes with repellents because ticks may crawl from clothing to the skin. Products containing permethrin are recommended for use on clothing, shoes, bed nets, and camping gear. Permethrin-treated clothing repels and kills ticks, mosquitoes, and other insects and can keep working after laundering. Permethrin should be reapplied to clothing according to the instructions on the product label. You can buy clothing and hats pretreated with  permethrin. Permethrin does not work as a repellent when it is put on the skin.    Treat household pets for ticks and fleas. Check pets after they've been outdoors.    Brush off clothing and pets before entering the house.    After you have been outdoors, undress and check your body for ticks. They usually crawl around for several hours before biting. Check your clothes, too. Wash them right away to remove any ticks.    If you find a tick attached to your body, you need to remove it.    Grasp the tick with tweezers or fingers (covered with gloves or a tissue) as close to the skin as possible. Gently pull the tick straight away from you until it releases its hold. Use a slow gentle pulling motion. Pulling the tick out too quickly may tear the body from the mouth, leaving the mouth still in the skin. If you are unable to remove the tick completely, you may need to see your healthcare provider. Do not twist the tick as you pull, and try not to squeeze its body.    After you have removed the tick, thoroughly wash your hands and the bite area with soap and water. Put an antiseptic such as rubbing alcohol on the area where you were bitten.    Put the tick in a sealed plastic bag and keep it in the freezer. Identification of the tick may help your provider diagnose and treat any symptoms. If you do not have any symptoms of disease after 1 month, you can throw away the tick.    Shower and shampoo after your outing.    Inspect any gear you have carried outdoors.    If you spend much time hiking, you may want to include a pair of tick tweezers in your first-aid kit. You can buy them at sporting goods stores.    If you had a shot against Lyme disease (shots were available until 2002) you are probably no longer protected because the vaccine loses its effect over time. A new vaccine may be approved by the FDA in 2015. There is no vaccine available for Lyme disease for humans at this time.  Developed by [employer].  Adult  Advisor 2016.3 published by [employer].  Last modified: 2016-07-13  Last reviewed: 2016-05-31  This content is reviewed periodically and is subject to change as new health information becomes available. The information is intended to inform and educate and is not a replacement for medical evaluation, advice, diagnosis or treatment by a healthcare professional.  References   Adult Advisor 2016.3 Index    Copyright   2016 [employer], a division of [employer] All rights reserved.

## 2017-12-29 NOTE — PATIENT INSTRUCTIONS
Patient Information     Patient Name MRN Sex Km Wei [id number] Male 1955      Patient Instructions by Francine Frye DO at 2017 12:30 PM     Author:  Francine Frye DO Service:  (none) Author Type:  PHYS- Osteopathic     Filed:  2017  3:49 PM Encounter Date:  2017 Status:  Signed     :  Francine Frye DO (PHYS- Osteopathic)            Follow up in 1 weeks time for biopsy

## 2017-12-30 NOTE — NURSING NOTE
Patient Information     Patient Name MRN Km Ronquillo [id number] Male 1955      Nursing Note by Jennyfer Parikh at 2017  9:30 AM     Author:  Jennyfer Parikh Service:  (none) Author Type:  (none)     Filed:  2017  9:42 AM Encounter Date:  2017 Status:  Signed     :  Jennyfer Parikh            Patient is here today for a medication check up, would like a refill on his Methadone. Jennyfer Parikh LPN......................2017 9:26 AM

## 2017-12-30 NOTE — NURSING NOTE
Patient Information     Patient Name MRN Sex Km Wei [id number] Male 1955      Nursing Note by Inna Denis at 2017 12:15 PM     Author:  Inna Denis Service:  (none) Author Type:  NURS- Student Practical Nurse     Filed:  2017 12:28 PM Encounter Date:  2017 Status:  Signed     :  Inna Denis (NURS- Student Practical Nurse)            TICK BITE  Location: right upper arm  Head removed: yesterday  Rash/itching: no  Swelling: no  Fever: no  Chills: no  Muscle or joint pain: no  Headache: no  Inna Denis LPN .............2017  12:25 PM

## 2017-12-30 NOTE — NURSING NOTE
Patient Information     Patient Name MRN Sex Km Wei [id number] Male 1955      Nursing Note by Mohini Prado at 2017  3:20 PM     Author:  Mohini Prado Service:  (none) Author Type:  (none)     Filed:  2017  3:16 PM Encounter Date:  2017 Status:  Signed     :  Mohini Prado            Here today for a biopsy.  Mohini Prado LPN..........2017  3:13 PM

## 2017-12-30 NOTE — NURSING NOTE
Patient Information     Patient Name MRN Sex Km Wei [id number] Male 1955      Nursing Note by Sonal Daly at 2017  8:45 AM     Author:  Sonal Daly Service:  (none) Author Type:  (none)     Filed:  2017  8:39 AM Encounter Date:  2017 Status:  Signed     :  Sonal Daly            Patient here for couple issues, the worst one is leg pain in his calf muscle. Had a deer tick bite  not sure if it is related.   Sonal Daly LPN ..........2017 8:34 AM

## 2017-12-30 NOTE — NURSING NOTE
"Patient Information     Patient Name MRN Km Ronquillo [id number] Male 1955      Nursing Note by Janette Park RN at 2017  8:45 AM     Author:  Janette Park RN Service:  (none) Author Type:  NURS- Registered Nurse     Filed:  2017  8:56 AM Encounter Date:  2017 Status:  Signed     :  Janette Park RN (NURS- Registered Nurse)            Patient given \"Aveed Treatment: A Patient Guide\" form.  Injection given and patient in clinic for 30 minutes after injection for monitoring.  Patient tolerated injection with no problems.  Janette Park RN.........2017...8:56 AM        "

## 2017-12-30 NOTE — NURSING NOTE
Patient Information     Patient Name MRN Sex Km Wei [id number] Male 1955      Nursing Note by Mohini Prado at 2017 12:30 PM     Author:  Mohini Prado Service:  (none) Author Type:  (none)     Filed:  2017 12:53 PM Encounter Date:  2017 Status:  Signed     :  Mohini Prado            Is due for a colonoscopy, never has had one.  He agrees that he should get this set up soon.  Mohini Prado LPN..........2017  12:53 PM

## 2017-12-30 NOTE — NURSING NOTE
Patient Information     Patient Name MRN Sex Km Wei [id number] Male 1955      Nursing Note by Kisha Valdovinos at 2017 10:30 AM     Author:  Kisha Valdovinos Service:  (none) Author Type:  (none)     Filed:  2017 10:24 AM Encounter Date:  2017 Status:  Signed     :  Kisha Valdovinos            Coming in to talk about a biopsy, did lab work.  Kisha Valdovinos ....................  2017   10:18 AM

## 2017-12-30 NOTE — NURSING NOTE
Patient Information     Patient Name MRN Sex Km Wei [id number] Male 1955      Nursing Note by Mohini Prado at 2017  3:20 PM     Author:  Mohini Prado Service:  (none) Author Type:  (none)     Filed:  2017  4:06 PM Encounter Date:  2017 Status:  Signed     :  Mohini Prado            Universal Protocol    A. Pre-procedure verification complete yes  1-relevant information / documentation available, reviewed and properly matched to the patient; 2-consent accurate and complete, 3-equipment and supplies available    B. Site marking complete Yes  Site marked if not in continuous attendance with patient    C. TIME OUT completed yes  Time Out was conducted just prior to starting procedure to verify the eight required elements: 1-patient identity, 2-consent accurate and complete, 3-position, 4-correct side/site marked (if applicable), 5-procedure, 6-relevant images / results properly labeled and displayed (if applicable), 7-antibiotics / irrigation fluids (if applicable), 8-safety precautions.  Mohini Prado LPN..........2017  4:05 PM

## 2017-12-30 NOTE — NURSING NOTE
Patient Information     Patient Name MRN Km Ronquillo [id number] Male 1955      Nursing Note by Ade Becerril at 2017  8:45 AM     Author:  Ade Becerril Service:  (none) Author Type:  (none)     Filed:  2017  9:14 AM Encounter Date:  2017 Status:  Signed     :  Ade Becerril            Patient presents to the clinic for aveed injection.  Ade Becerril LPN........................2017  9:12 AM    Review of Systems:    Weight loss:    No     Recent fever/chills:  yes   Night sweats:   No  Current skin rash:  No   Recent hair loss:  No  Heat intolerance:  No   Cold intolerance:  yes  Chest pain:   No   Palpitations:   No  Shortness of breath:  No   Wheezing:   No  Constipation:    No   Diarrhea:   No   Nausea:   yes   Vomiting:   No   Kidney/side pain:  No   Back pain:   yes  Frequent headaches:  No   Dizziness:     No  Leg swelling:   No   Calf pain:    yes    Ade Becerril LPN........................2017  9:12 AM

## 2017-12-30 NOTE — NURSING NOTE
Patient Information     Patient Name MRN Km Ronquillo [id number] Male 1955      Nursing Note by Amanda Tiwari at 2017  4:00 PM     Author:  Amanda Tiwari Service:  (none) Author Type:  (none)     Filed:  2017  7:35 PM Encounter Date:  2017 Status:  Signed     :  Amanda Tiwari            Patient presents to the clinic today for suture removal.    Amanda Tiwari ....................  2017   3:46 PM

## 2017-12-30 NOTE — NURSING NOTE
Patient Information     Patient Name MRN Sex Km Wei [id number] Male 1955      Nursing Note by Janette Park RN at 2017  8:45 AM     Author:  Janette Park RN Service:  (none) Author Type:  NURS- Registered Nurse     Filed:  2017  8:33 AM Encounter Date:  2017 Status:  Signed     :  Janette Park RN (NURS- Registered Nurse)            Review of Systems:    Weight loss:    No     Recent fever/chills:  No   Night sweats:   No  Current skin rash:  No   Recent hair loss:  No  Heat intolerance:  No   Cold intolerance:  No  Chest pain:   No   Palpitations:   No  Shortness of breath:  No   Wheezing:   No  Constipation:    No   Diarrhea:   No   Nausea:   No   Vomiting:   No   Kidney/side pain:  No   Back pain:   Yes  Frequent headaches:  No   Dizziness:     No  Leg swelling:   No   Calf pain:    Yes

## 2017-12-30 NOTE — NURSING NOTE
"Patient Information     Patient Name MRN Km Ronquillo [id number] Male 1955      Nursing Note by Janette Park RN at 2017  8:45 AM     Author:  Janette Park RN Service:  (none) Author Type:  NURS- Registered Nurse     Filed:  2017  9:25 AM Encounter Date:  2017 Status:  Signed     :  Janette Park RN (NURS- Registered Nurse)            Patient given \"Aveed Treatment: A Patient Guide\" form.  Injection given and patient in clinic for 30 minutes after injection for monitoring.  Patient tolerated injection with no problems.  Janette Park RN.........2017...9:25 AM        "

## 2018-01-03 NOTE — PROGRESS NOTES
Patient Information     Patient Name MRN Sex Km Wei [id number] Male 1955      Progress Notes by Peter Turner MD at 2017  8:45 AM     Author:  Peter Turner MD Service:  (none) Author Type:  Physician     Filed:  2017  9:04 AM Encounter Date:  2017 Status:  Signed     :  Peter Turner MD (Physician)            Type of Visit  EST    Chief Complaint  Hypogonadism    HPI  Mr. Freedman is a 61 y.o. male who follows up with symptomatic hypogonadism.  Primary symptoms include low energy and poor sleep.  Symptoms started 5 years ago.  He previously failed patches and short acting injections required too many visits.  His sleep and energy have both improved.  Overall he reports excellent improvement in symptoms with the Aveed.  He has now been on Aveed 1 year.  His recent labs revealed new development of polycythemia.  He denies chest pain, shortness of breath.  Overall he feels very good.  He denies breast swelling or tenderness, acne.      Review of Systems  I reviewed the ROS with the patient today.    Nursing Notes:   Trudy Gay  2017  8:46 AM  Unsigned  Here for follow up on labs and   Aveed.  Review of Systems:    Weight loss:    No     Recent fever/chills:  No   Night sweats:   No  Current skin rash:  No   Recent hair loss:  No  Heat intolerance:  No   Cold intolerance:  No  Chest pain:   No   Palpitations:   No  Shortness of breath:  No   Wheezing:   No  Constipation:    Yes   Diarrhea:   No   Nausea:   No   Vomiting:   No   Kidney/side pain:  No   Back pain:   Yes  Frequent headaches:  No   Dizziness:     Yes  Leg swelling:   No   Calf pain:    Yes    Trudy Gay LPN  2017  8:46 AM                Physical Exam  Vitals:     17 0844   BP: 132/76   Resp: 12   Weight: 73.5 kg (162 lb)      Constitutional: No acute distress.  Alert and cooperative   Head: NCAT  Eyes: Conjunctivae normal  Cardiovascular: Regular rate.  Pulmonary/Chest: Respirations are  even and non-labored bilaterally, no audible wheezing  Abdominal: Soft. No distension, tenderness, masses or guarding.   Neurological: A + O x 3.  Cranial Nerves II-XII grossly intact.  Extremities: GILDARDO x 4, Warm. No clubbing.  No cyanosis.    Skin: Pink, warm and dry.  No visible rashes noted.  Psychiatric:  Normal mood and affect  Back:  No left CVA tenderness.  No right CVA tenderness.  Genitourinary:  Nonpalpable bladder    Labs  CREATININE (mg/dL)    Date Value   11/28/2016 0.93     Results for TEJINDER JONES (MRN [id number]) as of 2/8/2017 17:15   9/7/2016 08:55 1/26/2017 08:33   HEMATOCRIT                52.6 55.7 (H)   TESTOSTERONE,TOTAL 327.0 333.5   PSA TOTAL (DIAGNOSTIC) 0.739 0.867     Results for TEJINDER JONES (MRN [id number]) as of 2/9/2017 08:42   2/9/2017 08:18   HEMATOCRIT                48.6       Results for TEJINDER JONES (MRN [id number]) as of 9/22/2016 09:15   3/7/2016 09:16   TESTOSTERONE,TOTAL 104.4 (L)     Testosterone Injection  Today the patient received an injection of testosterone undecanoate 750mg.  This was given in the gluteus.  The results of this injection: None  Patient was monitored for 30 minutes following the injection.    Assessment  Hypogonadism- continue Aveed q10 weeks  Doing well other than recent polycythemia which resolved on labs today    Plan  Aveed 750mg IM every 10 weeks with labs 8 weeks after every other injection (lab only appt) and will review the results at the following injection appt.  He is due for labs in 18 weeks (lab only appt)- T, HCT, PSA

## 2018-01-03 NOTE — PROGRESS NOTES
Patient Information     Patient Name MRN Sex     Km Freedman [id number] Male 1955      Progress Notes by Kathy Cason at 3/20/2017 12:01 PM     Author:  Kathy Cason Service:  (none) Author Type:  (none)     Filed:  3/20/2017 12:01 PM Date of Service:  3/20/2017 12:01 PM Status:  Signed     :  Kathy Cason            [facility] Protocol    A. Pre-procedure verification complete yes  1-relevant information / documentation available, reviewed and properly matched to the patient; 2-consent accurate and complete, 3-equipment and supplies available    B. Site marking complete Yes  Site marked if not in continuous attendance with patient    C. TIME OUT completed yes  Time Out was conducted just prior to starting procedure to verify the eight required elements: 1-patient identity, 2-consent accurate and complete, 3-position, 4-correct side/site marked (if applicable), 5-procedure, 6-relevant images / results properly labeled and displayed (if applicable), 7-antibiotics / irrigation fluids (if applicable), 8-safety precautions.

## 2018-01-03 NOTE — TELEPHONE ENCOUNTER
Patient Information     Patient Name MRN Sex Km Wei [id number] Male 1955      Telephone Encounter by Janette Park RN at 2017  3:07 PM     Author:  Janette Park RN Service:  (none) Author Type:  NURS- Registered Nurse     Filed:  2017  3:11 PM Encounter Date:  2017 Status:  Signed     :  Janette Park RN (NURS- Registered Nurse)            Results given to patient regarding his elevated hematocrit.  Per Peter Turner MD patient should have his hematocrit drawn once more prior to his scheduled injection on 17.  Patient will come 20 minutes early to appointment for lab appointment.  To MD to sign order.  Janette Park RN.........2017...3:10 PM
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
## 2018-01-03 NOTE — PROGRESS NOTES
Patient Information     Patient Name MRN Sex Km Wei [id number] Male 1955      Progress Notes by Kathy Cason at 2017  1:30 PM     Author:  Kathy Cason Service:  (none) Author Type:  (none)     Filed:  2017  1:30 PM Date of Service:  2017  1:30 PM Status:  Signed     :  Kathy Cason            RECOVERY TIME  You may experience numbness and/or relief of your pain for up to 4-6 hours after the injection.  Your usual symptoms may return the night of the procedure and may possible be more severe than usual a day or two following.  Please keep track of your pain over the next several days and report how long the relief lasts to the doctor who referred you for this procedure.    The beneficial effects of the steroids usually require 2 to 3 days to take effect, buy may take as long as 5 to 7 days.  If there is no change in the pain, then investigation can be focused on other possible sources of your pain.  In either case, the information is useful to the doctor who referred you for this procedure.    POSSIBLE SIDE EFFECTS  Facial flushing (redness), occasional low grade fevers of 99.5F or less, hiccups, insomnia, headaches, increased heart rate, abdominal cramping, and/or a bloating feeling are side effects of the steroid medications and will go away 3 to 4 days after the injection.    Diabetic Patients  The steroids you have received may significantly increase your blood sugar levels.  Monitor your blood sugar level closely (4-6 times per day) for a period of 4 days or until your blood sugar level normalizes.  If your blood sugar level elevates significantly or you experience confusion, dizziness, sweating, please notify our primary physician and make him/her aware that you have received steroids.

## 2018-01-03 NOTE — NURSING NOTE
Patient Information     Patient Name MRN Sex Km Wei [id number] Male 1955      Nursing Note by Trudy Gay at 2017  8:45 AM     Author:  Trudy Gay Service:  (none) Author Type:  (none)     Filed:  2017  9:04 AM Encounter Date:  2017 Status:  Signed     :  Trudy Gay            Here for follow up on labs and   Aveed.  Review of Systems:    Weight loss:    No     Recent fever/chills:  No   Night sweats:   No  Current skin rash:  No   Recent hair loss:  No  Heat intolerance:  No   Cold intolerance:  No  Chest pain:   No   Palpitations:   No  Shortness of breath:  No   Wheezing:   No  Constipation:    Yes   Diarrhea:   No   Nausea:   No   Vomiting:   No   Kidney/side pain:  No   Back pain:   Yes  Frequent headaches:  No   Dizziness:     Yes  Leg swelling:   No   Calf pain:    Yes    Trudy Gay LPN  2017  8:46 AM

## 2018-01-03 NOTE — PROGRESS NOTES
Patient Information     Patient Name MRN Sex     Km Freedman [id number] Male 1955      Progress Notes by Kathy Cason at 2017  1:30 PM     Author:  Kathy Cason Service:  (none) Author Type:  (none)     Filed:  2017  1:30 PM Date of Service:  2017  1:30 PM Status:  Signed     :  Kathy Cason            [facility] Protocol    A. Pre-procedure verification complete yes  1-relevant information / documentation available, reviewed and properly matched to the patient; 2-consent accurate and complete, 3-equipment and supplies available    B. Site marking complete Yes  Site marked if not in continuous attendance with patient    C. TIME OUT completed yes  Time Out was conducted just prior to starting procedure to verify the eight required elements: 1-patient identity, 2-consent accurate and complete, 3-position, 4-correct side/site marked (if applicable), 5-procedure, 6-relevant images / results properly labeled and displayed (if applicable), 7-antibiotics / irrigation fluids (if applicable), 8-safety precautions.

## 2018-01-03 NOTE — PROGRESS NOTES
Patient Information     Patient Name MRN Sex Km Wei [id number] Male 1955      Progress Notes by Kathy Cason at 3/20/2017 12:01 PM     Author:  Kathy Cason Service:  (none) Author Type:  (none)     Filed:  3/20/2017 12:01 PM Date of Service:  3/20/2017 12:01 PM Status:  Signed     :  Kathy Cason            Falls Risk Criteria:    Age 65 and older or under age 4        Sensory deficits    Poor vision    Use of ambulatory aides    Impaired judgment    Unable to walk independently    Meets High Risk criteria for falls:  no

## 2018-01-03 NOTE — TELEPHONE ENCOUNTER
Patient Information     Patient Name MRN Km Ronquillo [id number] Male 1955      Telephone Encounter by Trudy Gay at 2017  2:48 PM     Author:  Trudy Gay Service:  (none) Author Type:  (none)     Filed:  2017  2:50 PM Encounter Date:  2017 Status:  Signed     :  Trudy Gay            Left message to call back on home phone and cell phone.  Trudy Gay LPN  2017  2:50 PM

## 2018-01-03 NOTE — PROGRESS NOTES
Patient Information     Patient Name MRN Km Ronquillo [id number] Male 1955      Progress Notes by Trudy Gay at 2017  8:45 AM     Author:  Trudy Gay Service:  (none) Author Type:  (none)     Filed:  2017  3:10 PM Encounter Date:  2017 Status:  Signed     :  rTudy Gay            Left message to call back.Trudy Gay LPN  2017  3:10 PM

## 2018-01-03 NOTE — PROGRESS NOTES
Patient Information     Patient Name MRN Sex Km Wei [id number] Male 1955      Progress Notes by Kathy Cason at 2017  1:30 PM     Author:  Kathy Cason Service:  (none) Author Type:  (none)     Filed:  2017  1:30 PM Date of Service:  2017  1:30 PM Status:  Signed     :  Kathy Cason            Falls Risk Criteria:    Age 65 and older or under age 4        Sensory deficits    Poor vision    Use of ambulatory aides    Impaired judgment    Unable to walk independently    Meets High Risk criteria for falls:  no

## 2018-01-04 NOTE — PROGRESS NOTES
Patient Information     Patient Name MRN Sex Km Wei [id number] Male 1955      Progress Notes by Kathy Cason at 3/20/2017 12:01 PM     Author:  Kathy Cason Service:  (none) Author Type:  (none)     Filed:  3/20/2017 12:02 PM Date of Service:  3/20/2017 12:01 PM Status:  Signed     :  Kathy Cason            RECOVERY TIME  You may experience numbness and/or relief of your pain for up to 4-6 hours after the injection.  Your usual symptoms may return the night of the procedure and may possible be more severe than usual a day or two following.  Please keep track of your pain over the next several days and report how long the relief lasts to the doctor who referred you for this procedure.    The beneficial effects of the steroids usually require 2 to 3 days to take effect, buy may take as long as 5 to 7 days.  If there is no change in the pain, then investigation can be focused on other possible sources of your pain.  In either case, the information is useful to the doctor who referred you for this procedure.    POSSIBLE SIDE EFFECTS  Facial flushing (redness), occasional low grade fevers of 99.5F or less, hiccups, insomnia, headaches, increased heart rate, abdominal cramping, and/or a bloating feeling are side effects of the steroid medications and will go away 3 to 4 days after the injection.    Diabetic Patients  The steroids you have received may significantly increase your blood sugar levels.  Monitor your blood sugar level closely (4-6 times per day) for a period of 4 days or until your blood sugar level normalizes.  If your blood sugar level elevates significantly or you experience confusion, dizziness, sweating, please notify our primary physician and make him/her aware that you have received steroids.

## 2018-01-04 NOTE — PROGRESS NOTES
Patient Information     Patient Name MRN Sex Km Wei [id number] Male 1955      Progress Notes by Peter Turner MD at 2017  8:45 AM     Author:  Peter Turner MD Service:  (none) Author Type:  Physician     Filed:  2017  1:14 PM Encounter Date:  2017 Status:  Signed     :  Peter Turner MD (Physician)            Type of Visit  EST    Chief Complaint  Hypogonadism    HPI  Mr. Freedman is a 61 y.o. male who follows up with symptomatic hypogonadism.  Primary symptoms include low energy and poor sleep.  Symptoms started 5 years ago.  He previously failed patches and short acting injections required too many visits.    He started Aveed over 1 year ago.  He reports excellent symptom improvement in both daytime tiredness, quality of daytime sleep and energy level.  He denies chest pain, shortness of breath.  Overall he feels very good.  He denies breast swelling or tenderness or acne      Review of Systems  I reviewed the ROS with the patient today.    Nursing Notes:   Trudy Gay  2017  8:53 AM  Signed  Here for Aveed injection.  Review of Systems:    Weight loss:    No     Recent fever/chills:  No   Night sweats:   No  Current skin rash:  No   Recent hair loss:  No  Heat intolerance:  No   Cold intolerance:  No  Chest pain:   No   Palpitations:   No  Shortness of breath:  No   Wheezing:   No  Constipation:    Yes   Diarrhea:   No   Nausea:   No   Vomiting:   No   Kidney/side pain:  No   Back pain:   Yes  Frequent headaches:  No   Dizziness:     No  Leg swelling:   No   Calf pain:    Yes    Trudy Gay LPN  2017  8:35 AM                Physical Exam  Vitals:     17 0832   BP: 130/76   Resp: 14   Weight: 74.4 kg (164 lb)      Constitutional: No acute distress.  Alert and cooperative   Head: NCAT  Eyes: Conjunctivae normal  Cardiovascular: Regular rate.  Pulmonary/Chest: Respirations are even and non-labored bilaterally, no audible wheezing  Abdominal:  Soft. No distension, tenderness, masses or guarding.   Neurological: A + O x 3.  Cranial Nerves II-XII grossly intact.  Extremities: GILDARDO x 4, Warm. No clubbing.  No cyanosis.    Skin: Pink, warm and dry.  No visible rashes noted.  Psychiatric:  Normal mood and affect  Back:  No left CVA tenderness.  No right CVA tenderness.  Genitourinary:  Nonpalpable bladder    Labs  CREATININE (mg/dL)    Date Value   11/28/2016 0.93     Results for TEJINDER JONES (MRN [id number]) as of 2/8/2017 17:15   9/7/2016 08:55 1/26/2017 08:33   HEMATOCRIT                52.6 55.7 (H)   TESTOSTERONE,TOTAL 327.0 333.5   PSA TOTAL (DIAGNOSTIC) 0.739 0.867     Results for TEJINDER JONES (MRN [id number]) as of 2/9/2017 08:42   2/9/2017 08:18   HEMATOCRIT                48.6     Results for TEJINDER JONES (MRN [id number]) as of 9/22/2016 09:15   3/7/2016 09:16   TESTOSTERONE,TOTAL 104.4 (L)       Testosterone Injection  Today the patient received an injection of testosterone undecanoate 750mg.  This was given in the gluteus.  The results of this injection: None  Patient was monitored for 30 minutes following the injection.    Assessment  Hypogonadism- continue Aveed q10 weeks    Plan  Aveed 750mg IM every 10 weeks with labs 8 weeks after every other injection (lab only appt) and will review the results at the following injection appt.  He is due for labs in 18 weeks (lab only appt)- T, HCT, PSA

## 2018-01-05 ENCOUNTER — COMMUNICATION - GICH (OUTPATIENT)
Dept: FAMILY MEDICINE | Facility: OTHER | Age: 63
End: 2018-01-05

## 2018-01-05 DIAGNOSIS — E29.1 TESTICULAR HYPOFUNCTION: ICD-10-CM
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
## 2018-01-05 NOTE — PROGRESS NOTES
Patient Information     Patient Name MRN Sex     Km Freedman [id number] Male 1955      Progress Notes by Shayla Shaw PT at 2017  8:40 AM     Author:  Shayla Shaw PT Service:  (none) Author Type:  PT- Physical Therapist     Filed:  2017 11:47 AM Date of Service:  2017  8:40 AM Status:  Signed     :  Shayla Shaw PT (PT- Physical Therapist)            [facility]  Outpatient PT - Daily Note        Date of Service: 2017   Visit #2    Patient Name: Km Freedman   YOB: 1955   Referring MD/Provider: Homar Flor MD  Diagnosis: sacroiliac joint pain  Treatment Diagnosis: low back pain, right hip pain, muscle weakness, myofascial tightness  Insurance: [employer]/Medicare  Start of Service: 17  Certification Dates: Start of Service: 17   Medicare/MA Re-Cert Due: 17      Subjective        Pain Ratin = Mild Pain, (Bothersome, Annoying, Irritating, Nagging) / Location:  Right hip    Less hip pain in groin and outer hip. Still has back pain but is happy his hip is hurting less.  Also reports decreased constipation since starting PT.    Objective  Postural loading:  General Listening: right abdomen  Head:  R/R  Shoulders: R/R  Pelvis: R/R    Standing Flexion: right  Stork: right  Hip Drop:  Seated Flexion:   Supine:    Pubes: right inferior   ASIS: right superior   Leg length: right long  Prone:   PSIS:   Leg length:  Sacral Base:  IGNACIA Posterior/Inferior:      Positional Facet Diagnoses:   ERS Right ERS Left Neutral  SB R/Rot Left Neutral  SB L/Rot Right FRS Right FRS Left   T10-11         T11-12         T12-L1         L1-2         L2-3         L3-4         L4-5         L5-S1         (* ERS = extended/rotated/sidebent; FRS = flexed/rotated/sidebent)    Today's Intervention:    Muscle energy technique (MET) for inferior right pubic shear  OSFM sigmoid mesocolon, fascia of toldt, right and left triangular ligaments  MFR  right lateral line, right leg pull, right lumbar plexus    Home Exercise Program:  Hook lying trunk rotation with knees apart to reduce right hip pain  Supine hamstring stretch on/off  Prone iliopsoas stretch left and right  Seated trunk rotation  Assessment    Therapist Assessment:  patient presents with chronic low back and right hip pain that has worsened over the last year. History of lumbar fusions, L4-5, L5-S1; has pain stimulator. No current HEP but does walk 2 miles daily if able.  Noted poor ability to rotate trunk, tight myofascial structures throughout trunk and abdomen, limited scar mobility from fusions, weakness with gluteals and hip muscles.            Goals:  Patient goal (time reference required): reduce hip and back pain with walking and sitting .     Long term goal: Patient is to self-manage symptoms and return to prior function in 16 weeks.       Functional goals:   Patient is to be independent in their Home Exercise Program in 16 weeks.  Patient is to tolerate walking with normal gait with 1-2/10 pain up to 45 minutes in 16 weeks.  Patient is report the ability to sleep 4 hours without awakening due to pain in 16 weeks.  Patient is to display and maintain normal joint mobility/symmetry in the lumbar spine and pelvis to allow correct stabilization during walking and lifting in 16 weeks.  Patient is to demonstrate ability to sit for 60 minutes for car travel in 16 weeks.  Patient is to report decreased pain to allow decreased reliance on pain medications by 25% in 16 weeks.     Patient participated in goal selection and understand(s) the plan of care: Yes   Patient Potential for Achieving Desired Outcome:  Excellent    Response to Intervention:  Near equal seated trunk rotation (slightly less to right)      Plan    Treatment Plan / Targeted Outcomes:     Frequency:   16 visits     Duration of Treatment: 8 weeks    Planned Interventions:  Possible physical therapy interventions include but are not  limited to:   Home Exercise Program development  Therapeutic Exercise (ROM & Strengthening)  Manual Therapy  Neuromuscular Re-education  Ultrasound  Electrical Stimulation  Phonophoresis with Ketoprofen  Iontophoresis with Dexamethasone  Therapeutic Activities  Hot pack  Cold pack    Plan for next visit:  MFR, MET, advance HEP if indicated    Student or PTA has been instructed in and demonstrates skills necessary to carry out above stated treatment plan: Yes    Thank you for your referral to [facility].  Please call with any questions, concerns or comments.  [phone]

## 2018-01-05 NOTE — NURSING NOTE
Patient Information     Patient Name MRN Sex Km Wei [id number] Male 1955      Nursing Note by Kisha Valdovinos at 5/15/2017  8:45 AM     Author:  Kisha Valdovinos Service:  (none) Author Type:  (none)     Filed:  5/15/2017  8:41 AM Encounter Date:  5/15/2017 Status:  Signed     :  Kisha Valdovinos            Coming in for a 3 month medication check, pain medication  Kisha Valdovinos ....................  5/15/2017   8:35 AM
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
## 2018-01-11 ENCOUNTER — AMBULATORY - GICH (OUTPATIENT)
Dept: LAB | Facility: OTHER | Age: 63
End: 2018-01-11

## 2018-01-11 DIAGNOSIS — E29.1 TESTICULAR HYPOFUNCTION: ICD-10-CM

## 2018-01-11 LAB
A/G RATIO - HISTORICAL: 2.3 (ref 1–2)
ABSOLUTE BASOPHILS - HISTORICAL: 0.1 THOU/CU MM
ABSOLUTE EOSINOPHILS - HISTORICAL: 0.3 THOU/CU MM
ABSOLUTE IMMATURE GRANULOCYTES(METAS,MYELOS,PROS) - HISTORICAL: 0.1 THOU/CU MM
ABSOLUTE LYMPHOCYTES - HISTORICAL: 2.2 THOU/CU MM (ref 0.9–2.9)
ABSOLUTE MONOCYTES - HISTORICAL: 0.7 THOU/CU MM
ABSOLUTE NEUTROPHILS - HISTORICAL: 4.7 THOU/CU MM (ref 1.7–7)
ALBUMIN SERPL-MCNC: 4.4 G/DL (ref 3.5–5.7)
ALP SERPL-CCNC: 55 IU/L (ref 34–104)
ALT (SGPT) - HISTORICAL: 28 IU/L (ref 7–52)
ANION GAP - HISTORICAL: 7 (ref 5–18)
AST SERPL-CCNC: 22 IU/L (ref 13–39)
BASOPHILS # BLD AUTO: 0.9 %
BILIRUB SERPL-MCNC: 0.9 MG/DL (ref 0.3–1)
BUN SERPL-MCNC: 25 MG/DL (ref 7–25)
BUN/CREAT RATIO - HISTORICAL: 22
CALCIUM SERPL-MCNC: 9.1 MG/DL (ref 8.6–10.3)
CHLORIDE SERPLBLD-SCNC: 103 MMOL/L (ref 98–107)
CHOL/HDL RATIO - HISTORICAL: 6.5
CHOLESTEROL TOTAL: 221 MG/DL
CO2 SERPL-SCNC: 27 MMOL/L (ref 21–31)
CREAT SERPL-MCNC: 1.13 MG/DL (ref 0.7–1.3)
EOSINOPHIL NFR BLD AUTO: 4.3 %
ERYTHROCYTE [DISTWIDTH] IN BLOOD BY AUTOMATED COUNT: 13.6 % (ref 11.5–15.5)
GFR IF NOT AFRICAN AMERICAN - HISTORICAL: >60 ML/MIN/1.73M2
GLOBULIN - HISTORICAL: 1.9 G/DL (ref 2–3.7)
GLUCOSE SERPL-MCNC: 106 MG/DL (ref 70–105)
HCT VFR BLD AUTO: 52.5 % (ref 37–53)
HDLC SERPL-MCNC: 34 MG/DL (ref 23–92)
HEMOGLOBIN: 17.5 G/DL (ref 13.5–17.5)
IMMATURE GRANULOCYTES(METAS,MYELOS,PROS) - HISTORICAL: 0.9 %
LDLC SERPL CALC-MCNC: 153 MG/DL
LYMPHOCYTES NFR BLD AUTO: 27 % (ref 20–44)
MCH RBC QN AUTO: 28.3 PG (ref 26–34)
MCHC RBC AUTO-ENTMCNC: 33.3 G/DL (ref 32–36)
MCV RBC AUTO: 85 FL (ref 80–100)
MONOCYTES NFR BLD AUTO: 8.2 %
NEUTROPHILS NFR BLD AUTO: 58.7 % (ref 42–72)
NON-HDL CHOLESTEROL - HISTORICAL: 187 MG/DL
PLATELET # BLD AUTO: 173 THOU/CU MM (ref 140–440)
PMV BLD: 10.6 FL (ref 6.5–11)
POTASSIUM SERPL-SCNC: 4.2 MMOL/L (ref 3.5–5.1)
PROT SERPL-MCNC: 6.3 G/DL (ref 6.4–8.9)
PROVIDER ORDERDED STATUS - HISTORICAL: ABNORMAL
RED BLOOD COUNT - HISTORICAL: 6.18 MIL/CU MM (ref 4.3–5.9)
SODIUM SERPL-SCNC: 137 MMOL/L (ref 133–143)
TRIGL SERPL-MCNC: 169 MG/DL
TSH - HISTORICAL: 6.03 UIU/ML (ref 0.34–5.6)
WHITE BLOOD COUNT - HISTORICAL: 8 THOU/CU MM (ref 4.5–11)

## 2018-01-17 ENCOUNTER — AMBULATORY - GICH (OUTPATIENT)
Dept: UROLOGY | Facility: OTHER | Age: 63
End: 2018-01-17

## 2018-01-17 DIAGNOSIS — E29.1 TESTICULAR HYPOFUNCTION: ICD-10-CM

## 2018-01-25 ENCOUNTER — OFFICE VISIT - GICH (OUTPATIENT)
Dept: UROLOGY | Facility: OTHER | Age: 63
End: 2018-01-25

## 2018-01-25 ENCOUNTER — HISTORY (OUTPATIENT)
Dept: UROLOGY | Facility: OTHER | Age: 63
End: 2018-01-25

## 2018-01-25 VITALS
DIASTOLIC BLOOD PRESSURE: 70 MMHG | DIASTOLIC BLOOD PRESSURE: 76 MMHG | BODY MASS INDEX: 25.49 KG/M2 | RESPIRATION RATE: 14 BRPM | WEIGHT: 168.2 LBS | WEIGHT: 168.2 LBS | RESPIRATION RATE: 16 BRPM | DIASTOLIC BLOOD PRESSURE: 86 MMHG | SYSTOLIC BLOOD PRESSURE: 132 MMHG | WEIGHT: 164 LBS | TEMPERATURE: 98.1 F | HEIGHT: 68 IN | SYSTOLIC BLOOD PRESSURE: 128 MMHG | SYSTOLIC BLOOD PRESSURE: 130 MMHG

## 2018-01-25 VITALS
SYSTOLIC BLOOD PRESSURE: 132 MMHG | HEIGHT: 68 IN | BODY MASS INDEX: 25.69 KG/M2 | WEIGHT: 162 LBS | WEIGHT: 169.5 LBS | SYSTOLIC BLOOD PRESSURE: 130 MMHG | DIASTOLIC BLOOD PRESSURE: 76 MMHG | SYSTOLIC BLOOD PRESSURE: 122 MMHG | DIASTOLIC BLOOD PRESSURE: 86 MMHG | BODY MASS INDEX: 23.92 KG/M2 | DIASTOLIC BLOOD PRESSURE: 62 MMHG | RESPIRATION RATE: 12 BRPM | WEIGHT: 169.5 LBS | BODY MASS INDEX: 25.69 KG/M2 | HEIGHT: 68 IN

## 2018-01-25 VITALS
BODY MASS INDEX: 27.12 KG/M2 | SYSTOLIC BLOOD PRESSURE: 126 MMHG | WEIGHT: 168.6 LBS | DIASTOLIC BLOOD PRESSURE: 64 MMHG | RESPIRATION RATE: 16 BRPM | DIASTOLIC BLOOD PRESSURE: 80 MMHG | HEART RATE: 84 BPM | RESPIRATION RATE: 16 BRPM | BODY MASS INDEX: 24.9 KG/M2 | BODY MASS INDEX: 25.06 KG/M2 | SYSTOLIC BLOOD PRESSURE: 116 MMHG | DIASTOLIC BLOOD PRESSURE: 80 MMHG | HEIGHT: 68 IN | HEART RATE: 64 BPM | WEIGHT: 169.2 LBS | WEIGHT: 172.8 LBS | WEIGHT: 168 LBS | DIASTOLIC BLOOD PRESSURE: 64 MMHG | SYSTOLIC BLOOD PRESSURE: 132 MMHG | DIASTOLIC BLOOD PRESSURE: 80 MMHG | HEIGHT: 67 IN | SYSTOLIC BLOOD PRESSURE: 128 MMHG | DIASTOLIC BLOOD PRESSURE: 74 MMHG | BODY MASS INDEX: 25.92 KG/M2 | HEART RATE: 68 BPM | SYSTOLIC BLOOD PRESSURE: 140 MMHG | HEART RATE: 68 BPM | WEIGHT: 170.8 LBS | TEMPERATURE: 97.5 F | SYSTOLIC BLOOD PRESSURE: 134 MMHG | BODY MASS INDEX: 26.08 KG/M2 | WEIGHT: 169 LBS | BODY MASS INDEX: 26.08 KG/M2 | HEIGHT: 69 IN | HEART RATE: 60 BPM | HEART RATE: 64 BPM | DIASTOLIC BLOOD PRESSURE: 70 MMHG | RESPIRATION RATE: 14 BRPM | WEIGHT: 164 LBS | BODY MASS INDEX: 25.88 KG/M2 | WEIGHT: 169 LBS | BODY MASS INDEX: 24.22 KG/M2 | SYSTOLIC BLOOD PRESSURE: 128 MMHG | SYSTOLIC BLOOD PRESSURE: 122 MMHG | DIASTOLIC BLOOD PRESSURE: 74 MMHG

## 2018-01-25 DIAGNOSIS — E29.1 TESTICULAR HYPOFUNCTION: ICD-10-CM

## 2018-02-07 ENCOUNTER — HISTORY (OUTPATIENT)
Dept: FAMILY MEDICINE | Facility: OTHER | Age: 63
End: 2018-02-07

## 2018-02-07 ENCOUNTER — COMMUNICATION - GICH (OUTPATIENT)
Dept: FAMILY MEDICINE | Facility: OTHER | Age: 63
End: 2018-02-07

## 2018-02-07 ENCOUNTER — OFFICE VISIT - GICH (OUTPATIENT)
Dept: FAMILY MEDICINE | Facility: OTHER | Age: 63
End: 2018-02-07

## 2018-02-07 DIAGNOSIS — E78.00 PURE HYPERCHOLESTEROLEMIA: ICD-10-CM

## 2018-02-07 DIAGNOSIS — Z02.89 ENCOUNTER FOR OTHER ADMINISTRATIVE EXAMINATIONS: ICD-10-CM

## 2018-02-07 DIAGNOSIS — M96.1 POSTLAMINECTOMY SYNDROME, NOT ELSEWHERE CLASSIFIED: ICD-10-CM

## 2018-02-07 DIAGNOSIS — M54.2 CERVICALGIA: ICD-10-CM

## 2018-02-09 VITALS
WEIGHT: 173 LBS | SYSTOLIC BLOOD PRESSURE: 110 MMHG | RESPIRATION RATE: 16 BRPM | DIASTOLIC BLOOD PRESSURE: 64 MMHG | BODY MASS INDEX: 26.7 KG/M2

## 2018-02-09 VITALS
RESPIRATION RATE: 16 BRPM | DIASTOLIC BLOOD PRESSURE: 70 MMHG | SYSTOLIC BLOOD PRESSURE: 128 MMHG | WEIGHT: 170.2 LBS | BODY MASS INDEX: 26.26 KG/M2

## 2018-02-12 NOTE — TELEPHONE ENCOUNTER
Patient Information     Patient Name MRN Sex Km Wei [id number] Male 1955      Telephone Encounter by Francine Willis at 2018  2:11 PM     Author:  Francine Willis Service:  (none) Author Type:  (none)     Filed:  2018  2:13 PM Encounter Date:  2018 Status:  Signed     :  Francine Willis            This patient is coming for lab on .  Please place orders. Thank you.

## 2018-02-12 NOTE — TELEPHONE ENCOUNTER
Patient Information     Patient Name MRN Sex Km Wei [id number] Male 1955      Telephone Encounter by Jyoti Quintana RN at 2017  4:44 PM     Author:  Jyoti Quintana RN Service:  (none) Author Type:  NURS- Registered Nurse     Filed:  2017  4:45 PM Encounter Date:  2017 Status:  Signed     :  Jyoti Quintana RN (NURS- Registered Nurse)            .  Diuretic Combinations    Office visit in the past 12 months or per provider note.    Last visit with MEE MANN was on: 2017 in [facility]  Next visit with MEE MANN is on: No future appointment listed with this provider  Next visit with Family Practice is on: No future appointment listed in this department    Lab test requirements:  Creatinine and Potassium annually, if ordering lab, order BMP.  CREATININE (mg/dL)    Date Value   2017 1.10     POTASSIUM (mmol/L)    Date Value   2017 4.5       Max refill for 12 months from last office visit or per provider note.

## 2018-02-13 ENCOUNTER — DOCUMENTATION ONLY (OUTPATIENT)
Dept: FAMILY MEDICINE | Facility: OTHER | Age: 63
End: 2018-02-13

## 2018-02-13 PROBLEM — R74.8 ELEVATED CPK: Status: ACTIVE | Noted: 2017-08-24

## 2018-02-13 PROBLEM — R25.2 LEG CRAMPS: Status: ACTIVE | Noted: 2018-02-13

## 2018-02-13 PROBLEM — M22.40 CHONDROMALACIA PATELLAE: Status: ACTIVE | Noted: 2018-02-13

## 2018-02-13 PROBLEM — M77.11 LATERAL EPICONDYLITIS OF RIGHT ELBOW: Status: ACTIVE | Noted: 2018-02-13

## 2018-02-13 PROBLEM — F33.0 DEPRESSION, MAJOR, RECURRENT, MILD (H): Status: ACTIVE | Noted: 2018-02-13

## 2018-02-13 PROBLEM — G25.81 RESTLESS LEG SYNDROME: Status: ACTIVE | Noted: 2018-02-13

## 2018-02-13 PROBLEM — M51.379 DEGENERATION OF LUMBAR OR LUMBOSACRAL INTERVERTEBRAL DISC: Status: ACTIVE | Noted: 2018-02-13

## 2018-02-13 PROBLEM — M12.9 ARTHROPATHY: Status: ACTIVE | Noted: 2018-02-13

## 2018-02-13 PROBLEM — G89.29 NECK PAIN, CHRONIC: Status: ACTIVE | Noted: 2018-02-13

## 2018-02-13 PROBLEM — M79.661 BILATERAL CALF PAIN: Status: ACTIVE | Noted: 2017-08-11

## 2018-02-13 PROBLEM — M54.2 NECK PAIN, CHRONIC: Status: ACTIVE | Noted: 2018-02-13

## 2018-02-13 PROBLEM — E78.00 HYPERCHOLESTEROLEMIA: Status: ACTIVE | Noted: 2018-02-13

## 2018-02-13 PROBLEM — M79.10 MUSCLE PAIN: Status: ACTIVE | Noted: 2017-08-31

## 2018-02-13 PROBLEM — M79.662 BILATERAL CALF PAIN: Status: ACTIVE | Noted: 2017-08-11

## 2018-02-13 PROBLEM — M79.609 PAIN IN LIMB: Status: ACTIVE | Noted: 2018-02-13

## 2018-02-13 RX ORDER — METHADONE HYDROCHLORIDE 10 MG/1
1 TABLET ORAL DAILY
COMMUNITY
Start: 2017-11-21 | End: 2018-04-24

## 2018-02-13 RX ORDER — LISINOPRIL/HYDROCHLOROTHIAZIDE 10-12.5 MG
1 TABLET ORAL DAILY
COMMUNITY
Start: 2017-12-22 | End: 2018-09-27

## 2018-02-13 RX ORDER — TRIAMCINOLONE ACETONIDE 1 MG/G
CREAM TOPICAL
COMMUNITY
Start: 2016-11-29 | End: 2023-04-20

## 2018-02-13 NOTE — PROGRESS NOTES
Patient Information     Patient Name MRN Sex     Km Freedman [id number] Male 1955      Progress Notes by Peter Turner MD at 2018  8:45 AM     Author:  Peter Turner MD Service:  (none) Author Type:  Physician     Filed:  2018  9:04 AM Encounter Date:  2018 Status:  Signed     :  Peter Turner MD (Physician)            Type of Visit  EST    Chief Complaint  Hypogonadism    HPI  Mr. Freedman is a 62 y.o. male who follows up with symptomatic hypogonadism.  Primary symptoms include low energy and poor sleep.  Symptoms started 5 years ago.  He previously failed patches and short acting injections required too many visits.  He started Aveed well  Almost 2 years ago.  He was due for labs 2 weeks ago however when he had his labs drawn in the lab collected cholesterol labs only.  He again denies acne or breast swelling.  His symptoms continue to be improved particularly improvement in energy.      Review of Systems  I reviewed the ROS with the patient today.    Nursing Notes:   Trudy Gay  2018  8:49 AM  Unsigned  Here for Aveed injection.  Review of Systems:    Weight loss:    No     Recent fever/chills:  No   Night sweats:   No  Current skin rash:  No   Recent hair loss:  No  Heat intolerance:  No   Cold intolerance:  No  Chest pain:   No   Palpitations:   No  Shortness of breath:  No   Wheezing:   No  Constipation:    No   Diarrhea:   No   Nausea:   Yes   Vomiting:   No   Kidney/side pain:  No   Back pain:   Yes  Frequent headaches:  No   Dizziness:     No  Leg swelling:   No   Calf pain:    Yes    Trudy Gay LPN  2018  8:49 AM              Physical Exam  Vitals:     18 0847   BP: 110/64   Cuff Site: Left Arm   Position: Sitting   Cuff Size: Adult Large   Resp: 16   Weight: 78.5 kg (173 lb)      Constitutional: No acute distress.  Alert and cooperative   Head: NCAT  Eyes: Conjunctivae normal  Cardiovascular: Regular rate.  Pulmonary/Chest: Respirations are  even and non-labored bilaterally, no audible wheezing  Abdominal: Soft. No distension, tenderness, masses or guarding.   Extremities: GILDARDO x 4, Warm. No clubbing.  No cyanosis.    Skin: Pink, warm and dry.  No visible rashes noted.  Back:  No left CVA tenderness.  No right CVA tenderness.  Genitourinary:  Nonpalpable bladder    Labs  CREATININE (mg/dL)    Date Value   01/11/2018 1.13     Results for TEJINDER JONES (MRN [id number]) as of 9/7/2017 09:27   8/2/2017 09:11   HEMATOCRIT                54.0 (H)   TESTOSTERONE,TOTAL 518.1     Results for TEJINDER JONES (MRN [id number]) as of 9/22/2016 09:15   3/7/2016 09:16   TESTOSTERONE,TOTAL 104.4 (L)     Results for TEJINDER JONES (MRN [id number]) as of 9/7/2017 09:37   9/7/2016 08:55 1/26/2017 08:33 8/2/2017 09:11   PSA TOTAL (DIAGNOSTIC) 0.739 0.867 0.875     Testosterone Injection  Today the patient received an injection of testosterone undecanoate 750mg.  This was given in the gluteus.  The results of this injection: None  Patient was monitored for 30 minutes following the injection.    Assessment  Hypogonadism- continue Aveed q10 weeks  He is doing well and will be due for labs in 8 weeks.  We are getting labs every third  cycle.    Plan  Aveed 750mg IM every 10 weeks with labs 8 weeks after every other injection (lab only appt) and will review the results at the following injection appt.  He is due for labs in 8 weeks (lab only appt)- T, HCT

## 2018-02-13 NOTE — NURSING NOTE
Patient Information     Patient Name MRN Sex Km Wei [id number] Male 1955      Nursing Note by Trudy Gay at 2018  8:45 AM     Author:  Trudy Gay Service:  (none) Author Type:  (none)     Filed:  2018  9:04 AM Encounter Date:  2018 Status:  Signed     :  Trudy Gay            Here for Aveed injection.  Review of Systems:    Weight loss:    No     Recent fever/chills:  No   Night sweats:   No  Current skin rash:  No   Recent hair loss:  No  Heat intolerance:  No   Cold intolerance:  No  Chest pain:   No   Palpitations:   No  Shortness of breath:  No   Wheezing:   No  Constipation:    No   Diarrhea:   No   Nausea:   Yes   Vomiting:   No   Kidney/side pain:  No   Back pain:   Yes  Frequent headaches:  No   Dizziness:     No  Leg swelling:   No   Calf pain:    Yes    Trudy Gay LPN  2018  8:49 AM

## 2018-02-13 NOTE — NURSING NOTE
Patient Information     Patient Name MRN Sex Km Wei [id number] Male 1955      Nursing Note by Kisha Valdovinos at 2018 11:15 AM     Author:  Kisha Valdovinos Service:  (none) Author Type:  (none)     Filed:  2018 11:19 AM Encounter Date:  2018 Status:  Signed     :  Kisha Valdovinos            Coming in for a medication check.  Kisha Valdovinos ....................  2018   11:04 AM
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
## 2018-02-14 ENCOUNTER — TELEPHONE (OUTPATIENT)
Dept: FAMILY MEDICINE | Facility: OTHER | Age: 63
End: 2018-02-14

## 2018-02-14 NOTE — TELEPHONE ENCOUNTER
Spoke with the pharmacy in regards to mutual patient they were calling about filling patients methadone sooner than 2/18 due to patient being out of town. There is a phone note in the old system that had a verbal okay from Andres Flor MD. Gave the message to pharmacist at [facility].  Kadi Everett BLUE 2/14/2018 11:22 AM      Spoke with patients wife who confirmed patients last name and birth date. She stated patient was incorrect when he told Andres Flor MD today that he needed his prescription for Methadone on 2/18, patient will be leaving on that day and would need to pick it up and fill it on 2/17. They are aware that Andres Flor MD is out of the office the rest of today.  Please advise  Marichuy Floyd LPN............................ 2/7/2018 3:06 PM            Me   to Andres Flor MD           3:09 PM   I was unable to tell when the earliest prescription could be filled on.    February 8, 2018   Alison Gandara        11:36 AM   Note      I can call the pharmacy if given the OK to fill on 2/17/18.  Mckenzie Gandara LPN ...... 2/8/2018 11:36 AM                     11:37 AM   Alison Gandara routed this conversation to Andres Flor MD Pehl, Timothy J, MD   to [facility] [address]        12:05 PM   Note      Yes, it is OK to do the 17th.  Andres Flor MD ....................  2/8/2018   12:05 PM            Kisha Valdovinos        1:21 PM   Note      Called Pt with message  Kisha Valdovinos ....................  2/8/2018   1:22 PM

## 2018-02-16 LAB
6-MONOACETYL MORPHINE - HISTORICAL: ABNORMAL NG/ML
AMPHETAMINE URINE - HISTORICAL: ABNORMAL NG/ML
BARBITURATE URINE - HISTORICAL: ABNORMAL NG/ML
BENZODIAZEPINE URINE - HISTORICAL: ABNORMAL NG/ML
BUPRENORPHRINE URINE - HISTORICAL: ABNORMAL NG/ML
COCAINE METAB URINE - HISTORICAL: ABNORMAL NG/ML
CREAT UR - HISTORICAL: 136 MG/DL
ETHYLGLUCURONIDE URINE - HISTORICAL: ABNORMAL NG/ML
FENTANYL URINE - HISTORICAL: ABNORMAL NG/ML
MASS SPECTROMETRY URINE - HISTORICAL: ABNORMAL
METHADONE URINE - HISTORICAL: ABNORMAL NG/ML
OPIATES URINE - HISTORICAL: ABNORMAL NG/ML
OXYCODONE URINE - HISTORICAL: ABNORMAL NG/ML
PH URINE - HISTORICAL: 6.1
PROPOXYPHENE URINE - HISTORICAL: ABNORMAL NG/ML
THC 50 URINE - HISTORICAL: ABNORMAL NG/ML
TRAMADOL - HISTORICAL: ABNORMAL NG/ML

## 2018-03-15 DIAGNOSIS — F33.0 DEPRESSION, MAJOR, RECURRENT, MILD (H): Primary | ICD-10-CM

## 2018-03-21 DIAGNOSIS — E29.1 HYPOGONADISM IN MALE: Primary | ICD-10-CM

## 2018-03-21 NOTE — TELEPHONE ENCOUNTER
LOV with med review and PHQ9 2/7/18 Yesica Mcginnis, RN on 3/21/2018 at 8:42 AM    Max refill for depression DX is 6 months from lOV

## 2018-03-22 DIAGNOSIS — E29.1 HYPOGONADISM IN MALE: ICD-10-CM

## 2018-03-22 LAB
HCT VFR BLD AUTO: 53.2 % (ref 40–53)
TESTOST SERPL-MCNC: 398 NG/DL (ref 175–781)

## 2018-03-22 PROCEDURE — 36415 COLL VENOUS BLD VENIPUNCTURE: CPT | Performed by: UROLOGY

## 2018-03-22 PROCEDURE — 85014 HEMATOCRIT: CPT | Performed by: UROLOGY

## 2018-03-22 PROCEDURE — 84403 ASSAY OF TOTAL TESTOSTERONE: CPT | Performed by: UROLOGY

## 2018-04-05 ENCOUNTER — OFFICE VISIT (OUTPATIENT)
Dept: UROLOGY | Facility: OTHER | Age: 63
End: 2018-04-05
Attending: UROLOGY
Payer: COMMERCIAL

## 2018-04-05 VITALS
SYSTOLIC BLOOD PRESSURE: 136 MMHG | DIASTOLIC BLOOD PRESSURE: 80 MMHG | BODY MASS INDEX: 26.39 KG/M2 | WEIGHT: 171 LBS | RESPIRATION RATE: 14 BRPM

## 2018-04-05 DIAGNOSIS — E29.1 HYPOGONADISM IN MALE: Primary | ICD-10-CM

## 2018-04-05 PROCEDURE — 96372 THER/PROPH/DIAG INJ SC/IM: CPT

## 2018-04-05 PROCEDURE — 99213 OFFICE O/P EST LOW 20 MIN: CPT | Performed by: UROLOGY

## 2018-04-05 PROCEDURE — 25000128 H RX IP 250 OP 636: Performed by: UROLOGY

## 2018-04-05 RX ADMIN — TESTOSTERONE UNDECANOATE 750 MG: 250 INJECTION INTRAMUSCULAR at 08:50

## 2018-04-05 ASSESSMENT — PAIN SCALES - GENERAL: PAINLEVEL: MODERATE PAIN (5)

## 2018-04-05 NOTE — PROGRESS NOTES
Type of Visit  EST    Chief Complaint  Hypogonadism    HPI  Mr. Freedman is a 62 y.o. male who follows up with symptomatic hypogonadism.  Primary symptoms include low energy and poor sleep.  Overall he does feel better however he does have some residual low energy that concerns him.  He does feel like he has cold and warm sensitivity.    Symptoms started 5 years ago.  He previously failed patches and short acting injections required too many visits.  He started Aveed 2 years ago and has been doing well.      Review of Systems  I reviewed the ROS with the patient today.    Nursing Notes:   Trudy Gay LPN  4/5/2018  8:54 AM  Signed  Here for Aveed injection.  Review of Systems:    Review of Systems:    Weight loss:    No     Recent fever/chills:  Yes   Night sweats:   No  Current skin rash:  No   Recent hair loss:  No  Heat intolerance:  No   Cold intolerance:  Yes  Chest pain:   No   Palpitations:   No  Shortness of breath:  No   Wheezing:   No  Constipation:    No   Diarrhea:   No   Nausea:   No   Vomiting:   No   Kidney/side pain:  No   Back pain:   Yes  Frequent headaches:  No   Dizziness:     No  Leg swelling:   No   Calf pain:    Yes    Trudy Gay LPN on 4/5/2018 at 8:45 AM      Physical Exam  /80 (BP Location: Left arm, Patient Position: Sitting, Cuff Size: Adult Regular)  Resp 14  Wt 77.6 kg (171 lb)  BMI 26.39 kg/m2  Constitutional: No acute distress.  Alert and cooperative   Head: NCAT  Eyes: Conjunctivae normal  Cardiovascular: Regular rate.  Pulmonary/Chest: Respirations are even and non-labored bilaterally, no audible wheezing  Abdominal: Soft. No distension, tenderness, masses or guarding.   Extremities: GILDARDO x 4, Warm. No clubbing.  No cyanosis.    Skin: Pink, warm and dry.  No visible rashes noted.  Back:  No left CVA tenderness.  No right CVA tenderness.  Genitourinary:  Nonpalpable bladder    Labs  CREATININE (mg/dL)   Date Value   01/11/2018 1.13     Results for ROBERT  TEJINDER MARTELL (MRN [id number]) as of 4/5/2018 07:57   3/22/2018 08:01   Hematocrit 53.2 (H)   Testosterone Total 398       Results for TEJINDER JONES (MRN [id number]) as of 9/7/2017 09:27   8/2/2017 09:11   HEMATOCRIT                54.0 (H)   TESTOSTERONE,TOTAL 518.1     Results for TEJINDER JONES (MRN [id number]) as of 9/22/2016 09:15   3/7/2016 09:16   TESTOSTERONE,TOTAL 104.4 (L)     Results for TEJINDER JONES (MRN [id number]) as of 9/7/2017 09:37   9/7/2016 08:55 1/26/2017 08:33 8/2/2017 09:11   PSA TOTAL (DIAGNOSTIC) 0.739 0.867 0.875       Results for TEJINDER JONES (MRN [id number]) as of 4/5/2018 08:55   Ref. Range 1/11/2018 09:49   TSH - Historical Latest Ref Range: 0.34 - 5.60 uIU/mL 6.03 (H)       Testosterone Injection  Today the patient received an injection of testosterone undecanoate 750mg.  This was given in the gluteus.  The results of this injection: None  Patient was monitored for 30 minutes following the injection.    Assessment  Hypogonadism- continue Aveed q10 weeks  He is doing well and will be due for labs in 8 weeks.  We are getting labs every third  cycle.    Plan  I encouraged him to discuss his thyroid with Dr Flor at the next visit.  Aveed 750mg IM every 10 weeks with labs 8 weeks after every third injection (lab only appt) and will review the results at the following injection appt.   - He is next due for labs in 28 weeks (lab only appt)- T, HCT

## 2018-04-05 NOTE — Clinical Note
Homar, humor me on this one.   His testosterone is pretty much optimized.   I see his TSH is a bit elevated?  He is complaining of persistent low energy and extreme temperature intolerance.  Any thoughts? He sees you in about 6 weeks or so and might bring it up to you then.

## 2018-04-05 NOTE — MR AVS SNAPSHOT
"              After Visit Summary   4/5/2018    Km Freedman    MRN: [id number]           Patient Information     Date Of Birth          1955        Visit Information        Provider Department      4/5/2018 8:45 AM Peter Turner MD [facility]        Today's Diagnoses     Hypogonadism in male    -  1       Follow-ups after your visit        Your next 10 appointments already scheduled     Jun 14, 2018  8:45 AM CDT   Return Visit with Peter Turner MD   [facility] ([facility])    [address]  [place] [id number] [phone]              Who to contact     If you have questions or need follow up information about today's clinic visit or your schedule please contact [facility] directly at [phone].  Normal or non-critical lab and imaging results will be communicated to you by Zubiehart, letter or phone within 4 business days after the clinic has received the results. If you do not hear from us within 7 days, please contact the clinic through Zubiehart or phone. If you have a critical or abnormal lab result, we will notify you by phone as soon as possible.  Submit refill requests through [employer] or call your pharmacy and they will forward the refill request to us. Please allow 3 business days for your refill to be completed.          Additional Information About Your Visit        MyChart Information     [employer] lets you send messages to your doctor, view your test results, renew your prescriptions, schedule appointments and more. To sign up, go to [email or web address] . Click on \"Log in\" on the left side of the screen, which will take you to the Welcome page. Then click on \"Sign up Now\" on the right side of the page.     You will be asked to enter the access code listed below, as well as some personal information. Please follow the directions to create your username and password.     Your access code is: " [email or web address]  Expires: 2018  9:07 AM     Your access code will  in 90 days. If you need help or a new code, please call your [facility] or [phone].        Care EveryWhere ID     This is your Care EveryWhere ID. This could be used by other organizations to access your [place] medical records  [id number]        Your Vitals Were     Respirations BMI (Body Mass Index)                14 26.39 kg/m2           Blood Pressure from Last 3 Encounters:   18 136/80   18 128/70   18 110/64    Weight from Last 3 Encounters:   18 77.6 kg (171 lb)   18 77.2 kg (170 lb 3.2 oz)   18 78.5 kg (173 lb)              Today, you had the following     No orders found for display       Primary Care Provider Office Phone # Fax #    Andres Flor [phone]       [address] [facility] [address]        Equal Access to Services     [facility]: Hadii aad ku hadasho Soomaali, waaxda luqadaha, qaybta kaalmada adeegyada, alan duffy . So [facility] [phone].    ATENCIÓN: Si habla español, tiene a griffith disposición servicios gratuitos de asistencia lingüística. Llame al [phone].    We comply with applicable federal civil rights laws and [place] laws. We do not discriminate on the basis of race, color, national origin, age, disability, sex, sexual orientation, or gender identity.            Thank you!     Thank you for choosing [facility] AND [facility]  for your care. Our goal is always to provide you with excellent care. Hearing back from our patients is one way we can continue to improve our services. Please take a few minutes to complete the written survey that you may receive in the mail after your visit with us. Thank you!             Your Updated Medication List - Protect others around you: Learn how to safely use, store and throw away your medicines at www.disposemymeds.org.          This list is accurate as of  4/5/18  9:17 AM.  Always use your most recent med list.                   Brand Name Dispense Instructions for use Diagnosis    lisinopril-hydrochlorothiazide 10-12.5 MG per tablet    PRINZIDE/ZESTORETIC     Take 1 tablet by mouth daily        methadone 10 MG tablet    DOLOPHINE     Take 1 tablet by mouth daily . Fill on / after 1/20/18        sertraline 50 MG tablet    ZOLOFT    90 tablet    TAKE 1 TABLET BY MOUTH EVERY DAY    Depression, major, recurrent, mild (H)       triamcinolone 0.1 % cream    KENALOG

## 2018-04-05 NOTE — NURSING NOTE
Here for Aveed injection.  Review of Systems:    Review of Systems:    Weight loss:    No     Recent fever/chills:  Yes   Night sweats:   No  Current skin rash:  No   Recent hair loss:  No  Heat intolerance:  No   Cold intolerance:  Yes  Chest pain:   No   Palpitations:   No  Shortness of breath:  No   Wheezing:   No  Constipation:    No   Diarrhea:   No   Nausea:   No   Vomiting:   No   Kidney/side pain:  No   Back pain:   Yes  Frequent headaches:  No   Dizziness:     No  Leg swelling:   No   Calf pain:    Yes    Trudy Gay LPN on 4/5/2018 at 8:45 AM

## 2018-04-24 ENCOUNTER — OFFICE VISIT (OUTPATIENT)
Dept: FAMILY MEDICINE | Facility: OTHER | Age: 63
End: 2018-04-24
Attending: FAMILY MEDICINE
Payer: COMMERCIAL

## 2018-04-24 VITALS
DIASTOLIC BLOOD PRESSURE: 70 MMHG | BODY MASS INDEX: 26.23 KG/M2 | WEIGHT: 170 LBS | SYSTOLIC BLOOD PRESSURE: 128 MMHG | RESPIRATION RATE: 16 BRPM

## 2018-04-24 DIAGNOSIS — M51.379 DEGENERATION OF LUMBAR OR LUMBOSACRAL INTERVERTEBRAL DISC: Primary | ICD-10-CM

## 2018-04-24 PROCEDURE — 99213 OFFICE O/P EST LOW 20 MIN: CPT | Performed by: FAMILY MEDICINE

## 2018-04-24 RX ORDER — METHADONE HYDROCHLORIDE 10 MG/1
10 TABLET ORAL DAILY
Qty: 30 TABLET | Refills: 0 | Status: SHIPPED | OUTPATIENT
Start: 2018-04-24 | End: 2018-04-25

## 2018-04-24 RX ORDER — METHADONE HYDROCHLORIDE 10 MG/1
10 TABLET ORAL DAILY
Qty: 30 TABLET | Refills: 0 | Status: SHIPPED | OUTPATIENT
Start: 2018-04-24 | End: 2018-04-24

## 2018-04-24 ASSESSMENT — ANXIETY QUESTIONNAIRES
6. BECOMING EASILY ANNOYED OR IRRITABLE: NOT AT ALL
GAD7 TOTAL SCORE: 0
2. NOT BEING ABLE TO STOP OR CONTROL WORRYING: NOT AT ALL
1. FEELING NERVOUS, ANXIOUS, OR ON EDGE: NOT AT ALL
7. FEELING AFRAID AS IF SOMETHING AWFUL MIGHT HAPPEN: NOT AT ALL
IF YOU CHECKED OFF ANY PROBLEMS ON THIS QUESTIONNAIRE, HOW DIFFICULT HAVE THESE PROBLEMS MADE IT FOR YOU TO DO YOUR WORK, TAKE CARE OF THINGS AT HOME, OR GET ALONG WITH OTHER PEOPLE: NOT DIFFICULT AT ALL
5. BEING SO RESTLESS THAT IT IS HARD TO SIT STILL: NOT AT ALL
3. WORRYING TOO MUCH ABOUT DIFFERENT THINGS: NOT AT ALL

## 2018-04-24 ASSESSMENT — PAIN SCALES - GENERAL: PAINLEVEL: MODERATE PAIN (4)

## 2018-04-24 ASSESSMENT — ENCOUNTER SYMPTOMS
SLEEP DISTURBANCE: 1
FATIGUE: 0
BACK PAIN: 1

## 2018-04-24 ASSESSMENT — PATIENT HEALTH QUESTIONNAIRE - PHQ9: 5. POOR APPETITE OR OVEREATING: NOT AT ALL

## 2018-04-24 NOTE — PROGRESS NOTES
SUBJECTIVE:   Km Freedman is a 62 year old male who presents to clinic today for the following health issues:    HPI    Follow up on pain meds.  Overall pain is about the same. Perhaps less today.  Notes it is constant.  At night his legs are even worse.  Aching can get severe at times.  Will lay down, but has a hard time falling asleep.  Aching gets worse with laying down.  alleve helps a little.  In the distant past he was told he has restless leg syndrome,  Says he tried meds then, but this was over 8 years ago or more.  No diversion or abuse.  Last tox screen was 2/16/18, and was positive only for methadone.    Patient Active Problem List    Diagnosis Date Noted     Arthropathy 02/13/2018     Priority: Medium     Overview:   Degenerative facet arthritis in the low back       Chondromalacia patellae 02/13/2018     Priority: Medium     Overview:   Degenerative arthritis, chondromalacia patella left knee       Depression, major, recurrent, mild (H) 02/13/2018     Priority: Medium     Degeneration of lumbar or lumbosacral intervertebral disc 02/13/2018     Priority: Medium     Hypercholesterolemia 02/13/2018     Priority: Medium     Lateral epicondylitis of right elbow 02/13/2018     Priority: Medium     Leg cramps 02/13/2018     Priority: Medium     Overview:   Chronic       Pain in limb 02/13/2018     Priority: Medium     Neck pain, chronic 02/13/2018     Priority: Medium     Overview:   more prominent on the right       Restless leg syndrome 02/13/2018     Priority: Medium     Muscle pain 08/31/2017     Priority: Medium     Elevated CPK 08/24/2017     Priority: Medium     Bilateral calf pain 08/11/2017     Priority: Medium     Hypogonadism in male 04/25/2016     Priority: Medium     Failed back syndrome of lumbar spine 03/07/2016     Priority: Medium     Medication management 12/07/2015     Priority: Medium     Trochanteric bursitis of right hip 10/07/2015     Priority: Medium     HTN (hypertension)  10/30/2014     Priority: Medium     Pain medication agreement 12/16/2013     Priority: Medium     Overview:   Methadone 10 mg #30.   Signed 05/17/2013, UPDATED 1/2014, updated 3/7/2016       Enthesopathy of hip region 05/02/2011     Priority: Medium     Esophageal reflux 12/30/2009     Priority: Medium     Past Surgical History:   Procedure Laterality Date     FUSION LUMBAR ANTERIOR ONE LEVEL      1999,L4-L5     FUSION LUMBAR ANTERIOR ONE LEVEL      01/24/01,L5-S1 discectomy with anterior interbody fusion L4-5     OTHER SURGICAL HISTORY      1998,[id number],LUMBAR DISKECTOMY,L5-S1     OTHER SURGICAL HISTORY      2000,627029,OTHER,left medial branch     OTHER SURGICAL HISTORY      2001,841356,OTHER,Has had 2 rhizomoties     Social History   Substance Use Topics     Smoking status: Never Smoker     Smokeless tobacco: Never Used     Alcohol use No     Current Outpatient Prescriptions   Medication Sig Dispense Refill     lisinopril-hydrochlorothiazide (PRINZIDE/ZESTORETIC) 10-12.5 MG per tablet Take 1 tablet by mouth daily       methadone (DOLOPHINE) 10 MG tablet Take 1 tablet (10 mg) by mouth daily .Fill on / after 6/23/18 30 tablet 0     sertraline (ZOLOFT) 50 MG tablet TAKE 1 TABLET BY MOUTH EVERY DAY 90 tablet 1     triamcinolone (KENALOG) 0.1 % cream          Review of Systems   Constitutional: Negative for fatigue.   Musculoskeletal: Positive for back pain.   Psychiatric/Behavioral: Positive for sleep disturbance.        OBJECTIVE:     /70 (BP Location: Right arm, Patient Position: Sitting, Cuff Size: Adult Large)  Resp 16  Wt 170 lb (77.1 kg)  BMI 26.23 kg/m2  Body mass index is 26.23 kg/(m^2).  Physical Exam   Constitutional: He is oriented to person, place, and time. He appears well-developed. No distress.   Neurological: He is alert and oriented to person, place, and time.   Skin: He is not diaphoretic.   Psychiatric: He has a normal mood and affect. His behavior is normal. Thought content normal.        Diagnostic Test Results:  none     ASSESSMENT/PLAN:         (M51.37) Degeneration of lumbar or lumbosacral intervertebral disc  (primary encounter diagnosis)  Comment: no significant changes.  Plan: methadone (DOLOPHINE) 10 MG tablet,         DISCONTINUED: methadone (DOLOPHINE) 10 MG         tablet, DISCONTINUED: methadone (DOLOPHINE) 10         MG tablet        He intends to trial a taper and is down significantly from previous doses.  Would expect cessation to change the character of his pain.  Refilled the meds for 3 months, at 10mg daily.  I suspect he has RLS, but he does not want to try meds.   reviewed and stable.        Andres Flor MD  [facility]

## 2018-04-24 NOTE — MR AVS SNAPSHOT
"              After Visit Summary   4/24/2018    Km Freedman    MRN: [id number]           Patient Information     Date Of Birth          1955        Visit Information        Provider Department      4/24/2018 3:30 PM Andres Flor MD [facility]        Today's Diagnoses     Degeneration of lumbar or lumbosacral intervertebral disc    -  1       Follow-ups after your visit        Follow-up notes from your care team     Return in about 3 months (around 7/24/2018).      Your next 10 appointments already scheduled     Jun 14, 2018  8:45 AM CDT   Return Visit with Peter Turner MD   [facility] ([facility])    [address]  [facility] [address] [phone]              Who to contact     If you have questions or need follow up information about today's clinic visit or your schedule please contact [facility] directly at [phone].  Normal or non-critical lab and imaging results will be communicated to you by Smart Surgicalhart, letter or phone within 4 business days after the clinic has received the results. If you do not hear from us within 7 days, please contact the clinic through [email or web address] or phone. If you have a critical or abnormal lab result, we will notify you by phone as soon as possible.  Submit refill requests through Service Seeking or call your pharmacy and they will forward the refill request to us. Please allow 3 business days for your refill to be completed.          Additional Information About Your Visit        Smart Surgicalhart Information     Service Seeking lets you send messages to your doctor, view your test results, renew your prescriptions, schedule appointments and more. To sign up, go to [email or web address] . Click on \"Log in\" on the left side of the screen, which will take you to the Welcome page. Then click on \"Sign up Now\" on the right side of the page.     You will be asked to enter the access code listed below, " as well as some personal information. Please follow the directions to create your username and password.     Your access code is: [email or web address]  Expires: 2018  9:07 AM     Your access code will  in 90 days. If you need help or a new code, please call your [place] clinic or [phone].        Care EveryWhere ID     This is your Care EveryWhere ID. This could be used by other organizations to access your [place] medical records  [id number]        Your Vitals Were     Respirations BMI (Body Mass Index)                16 26.23 kg/m2           Blood Pressure from Last 3 Encounters:   18 128/70   18 136/80   18 128/70    Weight from Last 3 Encounters:   18 170 lb (77.1 kg)   18 171 lb (77.6 kg)   18 170 lb 3.2 oz (77.2 kg)              Today, you had the following     No orders found for display         Today's Medication Changes          These changes are accurate as of 18  4:04 PM.  If you have any questions, ask your nurse or doctor.               Start taking these medicines.        Dose/Directions    methadone 10 MG tablet   Commonly known as:  DOLOPHINE   Used for:  Degeneration of lumbar or lumbosacral intervertebral disc   Started by:  Andres Flor MD        Dose:  10 mg   Take 1 tablet (10 mg) by mouth daily .Fill on / after 18   Quantity:  30 tablet   Refills:  0            Where to get your medicines      Some of these will need a paper prescription and others can be bought over the counter.  Ask your nurse if you have questions.     Bring a paper prescription for each of these medications     methadone 10 MG tablet               Information about OPIOIDS     PRESCRIPTION OPIOIDS: WHAT YOU NEED TO KNOW   You have a prescription for an opioid (narcotic) pain medicine. Opioids can cause addiction. If you have a history of chemical dependency of any type, you are at a higher risk of becoming addicted to opioids. Only take this medicine after all other  options have been tried. Take it for as short a time and as few doses as possible.     Do not:    Drive. If you drive while taking these medicines, you could be arrested for driving under the influence (DUI).    Operate heavy machinery    Do any other dangerous activities while taking these medicines.     Drink any alcohol while taking these medicines.      Take with any other medicines that contain acetaminophen. Read all labels carefully. Look for the word  acetaminophen  or  Tylenol.  Ask your pharmacist if you have questions or are unsure.    Store your pills in a secure place, locked if possible. We will not replace any lost or stolen medicine. If you don t finish your medicine, please throw away (dispose) as directed by your pharmacist. The [place] Pollution Control Agency has more information about safe disposal: https://www.pca.[facility].[place].us/living-green/managing-unwanted-medications    All opioids tend to cause constipation. Drink plenty of water and eat foods that have a lot of fiber, such as fruits, vegetables, prune juice, apple juice and high-fiber cereal. Take a laxative (Miralax, milk of magnesia, Colace, Senna) if you don t move your bowels at least every other day.          Primary Care Provider Office Phone # Fax #    Andres Flor [phone] [address] [facility] [address]        Equal Access to Services     [facility]: Hadii zheng britoo Sopia, waaxda luqadaha, qaybta kaalmada adeegyada, alan sanchez. So [facility] [phone].    ATENCIÓN: Si habla español, tiene a griffith disposición servicios gratuitos de asistencia lingüística. Llame al [phone].    We comply with applicable federal civil rights laws and [place] laws. We do not discriminate on the basis of race, color, national origin, age, disability, sex, sexual orientation, or gender identity.            Thank you!     Thank you for choosing [facility] AND  hospitals  for your care. Our goal is always to provide you with excellent care. Hearing back from our patients is one way we can continue to improve our services. Please take a few minutes to complete the written survey that you may receive in the mail after your visit with us. Thank you!             Your Updated Medication List - Protect others around you: Learn how to safely use, store and throw away your medicines at www.disposemymeds.org.          This list is accurate as of 4/24/18  4:04 PM.  Always use your most recent med list.                   Brand Name Dispense Instructions for use Diagnosis    lisinopril-hydrochlorothiazide 10-12.5 MG per tablet    PRINZIDE/ZESTORETIC     Take 1 tablet by mouth daily        methadone 10 MG tablet    DOLOPHINE    30 tablet    Take 1 tablet (10 mg) by mouth daily .Fill on / after 6/23/18    Degeneration of lumbar or lumbosacral intervertebral disc       sertraline 50 MG tablet    ZOLOFT    90 tablet    TAKE 1 TABLET BY MOUTH EVERY DAY    Depression, major, recurrent, mild (H)       triamcinolone 0.1 % cream    KENALOG

## 2018-04-25 RX ORDER — METHADONE HYDROCHLORIDE 10 MG/1
10 TABLET ORAL DAILY
Qty: 30 TABLET | Refills: 0 | Status: SHIPPED | OUTPATIENT
Start: 2018-04-25 | End: 2018-09-27

## 2018-04-25 ASSESSMENT — ANXIETY QUESTIONNAIRES: GAD7 TOTAL SCORE: 0

## 2018-05-30 ENCOUNTER — TELEPHONE (OUTPATIENT)
Dept: FAMILY MEDICINE | Facility: OTHER | Age: 63
End: 2018-05-30

## 2018-05-30 NOTE — TELEPHONE ENCOUNTER
Wife wanting to inform TJP that patient will be seeing a pain specialist. Requesting a call back.

## 2018-05-30 NOTE — TELEPHONE ENCOUNTER
Spoke with wife  She wants to update Andres Flor MD that patient has an apt. On 6/22/18 for a consult for medicinal Marijuana in [place]. Jennyfer Parikh LPN......................5/30/2018 11:58 AM

## 2018-06-14 ENCOUNTER — OFFICE VISIT (OUTPATIENT)
Dept: UROLOGY | Facility: OTHER | Age: 63
End: 2018-06-14
Attending: UROLOGY
Payer: COMMERCIAL

## 2018-06-14 VITALS
HEIGHT: 69 IN | RESPIRATION RATE: 12 BRPM | HEART RATE: 60 BPM | SYSTOLIC BLOOD PRESSURE: 128 MMHG | WEIGHT: 170 LBS | BODY MASS INDEX: 25.18 KG/M2 | DIASTOLIC BLOOD PRESSURE: 82 MMHG

## 2018-06-14 DIAGNOSIS — E29.1 HYPOGONADISM IN MALE: Primary | ICD-10-CM

## 2018-06-14 PROCEDURE — 96372 THER/PROPH/DIAG INJ SC/IM: CPT

## 2018-06-14 PROCEDURE — 99213 OFFICE O/P EST LOW 20 MIN: CPT | Performed by: UROLOGY

## 2018-06-14 PROCEDURE — 25000128 H RX IP 250 OP 636: Performed by: UROLOGY

## 2018-06-14 RX ADMIN — TESTOSTERONE UNDECANOATE 750 MG: 250 INJECTION INTRAMUSCULAR at 09:08

## 2018-06-14 ASSESSMENT — PAIN SCALES - GENERAL: PAINLEVEL: MODERATE PAIN (5)

## 2018-06-14 NOTE — NURSING NOTE
Review of Systems:    Weight loss:    No     Recent fever/chills:  Yes   Night sweats:   No  Current skin rash:  No   Recent hair loss:  No  Heat intolerance:  No   Cold intolerance:  No  Chest pain:   No   Palpitations:   No  Shortness of breath:  No   Wheezing:   No  Constipation:    No   Diarrhea:   No   Nausea:   No   Vomiting:   No   Kidney/side pain:  No   Back pain:   Yes  Frequent headaches:  No   Dizziness:     No  Leg swelling:   No   Calf pain:    Yes    Parents, brothers or sisters with history of kidney cancer:   No  Parents, brothers or sisters with history of bladder cancer: No

## 2018-06-14 NOTE — NURSING NOTE
"Patient given \"Aveed Treatment: A Patient Guide\" form.  Injection given and patient in clinic for 30 minutes after injection for monitoring.  Patient tolerated injection with no problems.  Janette Park RN.........6/14/2018...9:58 AM  "

## 2018-06-14 NOTE — PROGRESS NOTES
"Type of Visit  EST    Chief Complaint  Hypogonadism    HPI  Mr. Freedman is a 62 y.o. male who follows up with symptomatic hypogonadism.  Primary symptoms include low energy and poor sleep.  He has been on testosterone replacement for over 5 years.  He previously failed patches and short acting injections.  He is very satisfied with the current form of therapy with Aveed.  He denies side effects.      Review of Systems  I reviewed the ROS with the patient today.    Nursing Notes:   Janette Park RN  6/14/2018  8:59 AM  Signed  Review of Systems:    Weight loss:    No     Recent fever/chills:  Yes   Night sweats:   No  Current skin rash:  No   Recent hair loss:  No  Heat intolerance:  No   Cold intolerance:  No  Chest pain:   No   Palpitations:   No  Shortness of breath:  No   Wheezing:   No  Constipation:    No   Diarrhea:   No   Nausea:   No   Vomiting:   No   Kidney/side pain:  No   Back pain:   Yes  Frequent headaches:  No   Dizziness:     No  Leg swelling:   No   Calf pain:    Yes    Parents, brothers or sisters with history of kidney cancer:   No  Parents, brothers or sisters with history of bladder cancer: No    Janette Park RN  6/14/2018  9:58 AM  Signed  Patient given \"Aveed Treatment: A Patient Guide\" form.  Injection given and patient in clinic for 30 minutes after injection for monitoring.  Patient tolerated injection with no problems.  Janette Park RN.........6/14/2018...9:58 AM    Physical Exam  /82 (BP Location: Right arm, Patient Position: Sitting, Cuff Size: Adult Regular)  Pulse 60  Resp 12  Ht 1.753 m (5' 9\")  Wt 77.1 kg (170 lb)  BMI 25.1 kg/m2  Constitutional: No acute distress.  Alert and cooperative   Head: NCAT  Eyes: Conjunctivae normal  Cardiovascular: Regular rate.  Pulmonary/Chest: Respirations are even and non-labored bilaterally, no audible wheezing  Abdominal: Soft. No distension, tenderness, masses or guarding.   Extremities: GILDARDO x 4, Warm. No clubbing.  No " cyanosis.    Skin: Pink, warm and dry.  No visible rashes noted.  Back:  No left CVA tenderness.  No right CVA tenderness.  Genitourinary:  Nonpalpable bladder    Labs  CREATININE (mg/dL)   Date Value   01/11/2018 1.13     Results for TEJINDER JONES (MRN [id number]) as of 4/5/2018 07:57   3/22/2018 08:01   Hematocrit 53.2 (H)   Testosterone Total 398     Results for TEJINDER JONES (MRN [id number]) as of 9/22/2016 09:15   3/7/2016 09:16   TESTOSTERONE,TOTAL 104.4 (L)     Results for TEJINDER JONES (MRN [id number]) as of 9/7/2017 09:37   9/7/2016 08:55 1/26/2017 08:33 8/2/2017 09:11   PSA TOTAL (DIAGNOSTIC) 0.739 0.867 0.875     Testosterone Injection  Today the patient received an injection of testosterone undecanoate 750mg.  This was given in the gluteus.  The results of this injection: None  Patient was monitored for 30 minutes following the injection.    Assessment  Hypogonadism- continue Aveed q10 weeks  We are getting labs every third cycle.    Plan  Aveed 750mg IM every 10 weeks with labs 8 weeks after every third injection (lab only appt) and will review the results at the following injection appt.   - He is next due for labs in 18 weeks (lab only appt)- T, HCT

## 2018-06-14 NOTE — MR AVS SNAPSHOT
After Visit Summary   6/14/2018    Km Freedman    MRN: [id number]           Patient Information     Date Of Birth          1955        Visit Information        Provider Department      6/14/2018 8:45 AM Peter Turner MD [facility]        Today's Diagnoses     Hypogonadism in male    -  1       Follow-ups after your visit        Your next 10 appointments already scheduled     Aug 13, 2018  8:15 AM CDT   Office Visit with Andres Flor MD   [facility] ([facility])    [address]  [place] [address] [phone]           Bring a current list of meds and any records pertaining to this visit. For Physicals, please bring immunization records and any forms needing to be filled out. Please arrive 10 minutes early to complete paperwork.            Aug 23, 2018  8:45 AM CDT   Return Visit with Peter Turner MD   [facility]    [address]  [place] [address] [phone]              Who to contact     If you have questions or need follow up information about today's clinic visit or your schedule please contact [facility] directly at [phone].  Normal or non-critical lab and imaging results will be communicated to you by latakoohart, letter or phone within 4 business days after the clinic has received the results. If you do not hear from us within 7 days, please contact the clinic through [email or web address] or phone. If you have a critical or abnormal lab result, we will notify you by phone as soon as possible.  Submit refill requests through CitiSent or call your pharmacy and they will forward the refill request to us. Please allow 3 business days for your refill to be completed.          Additional Information About Your Visit        CitiSent Information     CitiSent lets you send messages to your doctor, view your test results, renew  "your prescriptions, schedule appointments and more. To sign up, go to [email or web address] . Click on \"Log in\" on the left side of the screen, which will take you to the Welcome page. Then click on \"Sign up Now\" on the right side of the page.     You will be asked to enter the access code listed below, as well as some personal information. Please follow the directions to create your username and password.     Your access code is: [email or web address]  Expires: 2018  9:07 AM     Your access code will  in 90 days. If you need help or a new code, please call your [place] clinic or [phone].        Care EveryWhere ID     This is your Care EveryWhere ID. This could be used by other organizations to access your [place] medical records  [id number]        Your Vitals Were     Pulse Respirations Height BMI (Body Mass Index)          60 12 1.753 m (5' 9\") 25.1 kg/m2         Blood Pressure from Last 3 Encounters:   18 128/82   18 128/70   18 136/80    Weight from Last 3 Encounters:   18 77.1 kg (170 lb)   18 77.1 kg (170 lb)   18 77.6 kg (171 lb)              Today, you had the following     No orders found for display       Primary Care Provider Office Phone # Fax #    Andres Flor [phone]       [address] [facility] [address]        Equal Access to Services     [facility]: Hadii zheng britoo Sopia, waaxda luqadaha, qaybta kaalmada adechano, alan sanchez. So [facility] [phone].    ATENCIÓN: Si habla español, tiene a griffith disposición servicios gratuitos de asistencia lingüística. Llame al [phone].    We comply with applicable federal civil rights laws and [place] laws. We do not discriminate on the basis of race, color, national origin, age, disability, sex, sexual orientation, or gender identity.            Thank you!     Thank you for choosing [facility]  for your care. Our goal " is always to provide you with excellent care. Hearing back from our patients is one way we can continue to improve our services. Please take a few minutes to complete the written survey that you may receive in the mail after your visit with us. Thank you!             Your Updated Medication List - Protect others around you: Learn how to safely use, store and throw away your medicines at www.disposemymeds.org.          This list is accurate as of 6/14/18  1:33 PM.  Always use your most recent med list.                   Brand Name Dispense Instructions for use Diagnosis    lisinopril-hydrochlorothiazide 10-12.5 MG per tablet    PRINZIDE/ZESTORETIC     Take 1 tablet by mouth daily        methadone 10 MG tablet    DOLOPHINE    30 tablet    Take 1 tablet (10 mg) by mouth daily .Fill on / after 7/22/18    Degeneration of lumbar or lumbosacral intervertebral disc       sertraline 50 MG tablet    ZOLOFT    90 tablet    TAKE 1 TABLET BY MOUTH EVERY DAY    Depression, major, recurrent, mild (H)       triamcinolone 0.1 % cream    KENALOG

## 2018-07-23 NOTE — PROGRESS NOTES
Patient Information     Patient Name  Km Freedman MRN  [id number] Sex  Male   1955      Letter by Andres Flor MD at      Author:  Andres Flor MD Service:  (none) Author Type:  (none)    Filed:   Encounter Date:  2018 Status:  (Other)           Km Freedman  [address]  [place] [address]          2018    Dear Mr. Freedman:    Following are the tests completed during your last clinic visit.  The results of these tests are normal and require no further attention unless otherwise noted.    Results for orders placed or performed in visit on 18      DRUG SCREEN - PAIN MANAGEMENT - TOXASSURE      Result  Value Ref Range    6-MONOACETYL MORPHINE NEG NEG ng/mL    AMPHETAMINE URINE NEG <=500 ng/mL    BARBITURATE URINE NEG <=200 ng/mL    BENZODIAZEPINE URINE NEG <=200 ng/mL    BUPRENORPHRINE URINE NEG <=5 ng/mL    COCAINE METAB URINE NEG <=300 ng/mL    ETHYLGLUCURONIDE URINE NEG <=250 ng/mL    FENTANYL URINE NEG <=4 ng/mL    METHADONE URINE POS (A) <=300 ng/mL    OPIATES URINE NEG <=300 ng/mL    OXYCODONE URINE NEG <=100 ng/mL    PROPOXYPHENE URINE NEG <=300 ng/mL    THC 50 URINE NEG <=50 ng/mL    TRAMADOL NEG <=200 ng/mL    PH URINE 6.1 5.0 - 7.0    CREAT  >=20 mg/dL    MASS SPECTROMETRY URINE See Below          If you have any further questions or problems contact my office at  [phone].    Thank you,    Andres Flor MD

## 2018-07-23 NOTE — PROGRESS NOTES
Patient Information     Patient Name  Km Freedman MRN  [id number] Sex  Male   1955      Letter by Andres Flor MD at      Author:  Andres Flor MD Service:  (none) Author Type:  (none)    Filed:   Encounter Date:  2018 Status:  (Other)           Km Freedman  [address]  [place] [address]          2018    Dear Mr. Freedman:    Following are the tests completed during your last clinic visit.  The thyroid test is nearly normal.  If you are feeling fatigued, medications can help, but at this level they remain optional.  The cholesterol numbers place you at a 14 % chance of a heart attack in 10 years.  Testosterone might be a risk factor as well.  Statin type medications, like lipitor, can help this.  Let me know if you want to start something.  .    Results for orders placed or performed in visit on 18      COMPLETE METABOLIC PANEL      Result  Value Ref Range    SODIUM 137 133 - 143 mmol/L    POTASSIUM 4.2 3.5 - 5.1 mmol/L    CHLORIDE 103 98 - 107 mmol/L    CO2,TOTAL 27 21 - 31 mmol/L    ANION GAP 7 5 - 18                    GLUCOSE 106 (H) 70 - 105 mg/dL    CALCIUM 9.1 8.6 - 10.3 mg/dL    BUN 25 7 - 25 mg/dL    CREATININE 1.13 0.70 - 1.30 mg/dL    BUN/CREAT RATIO           22                    GFR if African American >60 >60 ml/min/1.73m2    GFR if not African American >60 >60 ml/min/1.73m2    ALBUMIN 4.4 3.5 - 5.7 g/dL    PROTEIN,TOTAL 6.3 (L) 6.4 - 8.9 g/dL    GLOBULIN                  1.9 (L) 2.0 - 3.7 g/dL    A/G RATIO 2.3 (H) 1.0 - 2.0                    BILIRUBIN,TOTAL 0.9 0.3 - 1.0 mg/dL    ALK PHOSPHATASE 55 34 - 104 IU/L    ALT (SGPT) 28 7 - 52 IU/L    AST (SGOT) 22 13 - 39 IU/L   LIPID PANEL      Result  Value Ref Range    CHOLESTEROL,TOTAL 221 (H) <200 mg/dL    TRIGLYCERIDES 169 (H) <150 mg/dL    HDL CHOLESTEROL 34 23 - 92 mg/dL    NON-HDL CHOLESTEROL 187 (H) <145 mg/dl    CHOL/HDL RATIO            6.50 (H) <4.50                    LDL CHOLESTEROL 153  (H) <100 mg/dL    PROVIDER ORDERED STATUS RANDOM    TSH      Result  Value Ref Range    TSH 6.03 (H) 0.34 - 5.60 uIU/mL   CBC WITH AUTO DIFFERENTIAL      Result  Value Ref Range    WHITE BLOOD COUNT         8.0 4.5 - 11.0 thou/cu mm    RED BLOOD COUNT           6.18 (H) 4.30 - 5.90 mil/cu mm    HEMOGLOBIN                17.5 13.5 - 17.5 g/dL    HEMATOCRIT                52.5 37.0 - 53.0 %    MCV                       85 80 - 100 fL    MCH                       28.3 26.0 - 34.0 pg    MCHC                      33.3 32.0 - 36.0 g/dL    RDW                       13.6 11.5 - 15.5 %    PLATELET COUNT            173 140 - 440 thou/cu mm    MPV                       10.6 6.5 - 11.0 fL    NEUTROPHILS               58.7 42.0 - 72.0 %    LYMPHOCYTES               27.0 20.0 - 44.0 %    MONOCYTES                 8.2 <12.0 %    EOSINOPHILS               4.3 <8.0 %    BASOPHILS                 0.9 <3.0 %    IMMATURE GRANULOCYTES(METAS,MYELOS,PROS) 0.9 %    ABSOLUTE NEUTROPHILS      4.7 1.7 - 7.0 thou/cu mm    ABSOLUTE LYMPHOCYTES      2.2 0.9 - 2.9 thou/cu mm    ABSOLUTE MONOCYTES        0.7 <0.9 thou/cu mm    ABSOLUTE EOSINOPHILS      0.3 <0.5 thou/cu mm    ABSOLUTE BASOPHILS        0.1 <0.3 thou/cu mm    ABSOLUTE IMMATURE GRANULOCYTES(METAS,MYELOS,PROS) 0.1 <=0.3 thou/cu mm         If you have any further questions or problems contact my office at  [phone].    Thank you,    Andres Flor MD

## 2018-07-23 NOTE — PROGRESS NOTES
Patient Information     Patient Name  Km Freedman MRN  [id number] Sex  Male   1955      Letter by Andres Flor MD at      Author:  Andres Flor MD Service:  (none) Author Type:  (none)    Filed:   Encounter Date:  2017 Status:  (Other)           Km Freedman  [address] [place] [address]          August 3, 2017    Dear Mr. Freedman:    Following are the tests completed during your last clinic visit.  The results of these tests are normal and require no further attention unless otherwise noted.  The TSH shows you have a very mildly underactive thyrodi.  This can cause muscle aching.  If you want, we can try replacement before the biopsy, or we can wait and see what the biopsy shows since you have had nearly life long symptoms and repeat the thyroid labs in 4-6 weeks.  Just call me if you want the medications.    Results for orders placed or performed in visit on 17      TESTOSTERONE,TOTAL      Result  Value Ref Range    TESTOSTERONE,TOTAL 518.1 175.0 - 781.0 ng/dL   HEMATOCRIT      Result  Value Ref Range    HEMATOCRIT                54.0 (H) 37.0 - 53.0 %   PSA TOTAL (DIAGNOSTIC)      Result  Value Ref Range    PSA TOTAL (DIAGNOSTIC) 0.875 <=3.100 ng/mL   BASIC METABOLIC PANEL      Result  Value Ref Range    SODIUM 136 133 - 143 mmol/L    POTASSIUM 4.2 3.5 - 5.1 mmol/L    CHLORIDE 100 98 - 107 mmol/L    CO2,TOTAL 28 21 - 31 mmol/L    ANION GAP 8 5 - 18                    GLUCOSE 94 70 - 105 mg/dL    CALCIUM 9.5 8.6 - 10.3 mg/dL    BUN 19 7 - 25 mg/dL    CREATININE 1.05 0.70 - 1.30 mg/dL    BUN/CREAT RATIO           18                    GFR if African American >60 >60 ml/min/1.73m2    GFR if not African American >60 >60 ml/min/1.73m2   LYME SCREEN W/REFLEX      Result  Value Ref Range    LYME SCREEN W/REFLEX WEST BLOT Negative Negative   TSH      Result  Value Ref Range    TSH 7.64 (H) 0.34 - 5.60 uIU/mL   CK TOTAL      Result  Value Ref Range    CK,TOTAL 293 (H) 30 - 223  IU/L         If you have any further questions or problems contact my office at  [phone].    Thank you,    Andres Flor MD

## 2018-07-24 NOTE — PROGRESS NOTES
Patient Information     Patient Name  Km Freedman MRN  [id number] Sex  Male   1955      Letter by Andres Flor MD at      Author:  Andres Flor MD Service:  (none) Author Type:  (none)    Filed:   Encounter Date:  2017 Status:  (Other)           Km Freedamn  [address]  [place] [address]          2017    Dear Mr. Freedman:    Following are the tests completed during your last clinic visit.  The results of these tests are normal and require no further attention unless otherwise noted.    Results for orders placed or performed in visit on 17      COMPLIANCE DRUG ANALYSIS      Result  Value Ref Range    6-MONOACETYL MORPHINE NEG NEG ng/mL    AMPHETAMINE URINE NEG <=500 ng/mL    BARBITURATE URINE NEG <=200 ng/mL    BENZODIAZEPINE URINE NEG <=200 ng/mL    BUPRENORPHRINE URINE NEG <=5 ng/mL    COCAINE METAB URINE NEG <=300 ng/mL    ETHYLGLUCURONIDE URINE NEG <=250 ng/mL    FENTANYL URINE NEG <=4 ng/mL    METHADONE URINE POS (A) <=300 ng/mL    OPIATES URINE NEG <=300 ng/mL    OXYCODONE URINE NEG <=100 ng/mL    PROPOXYPHENE URINE NEG <=300 ng/mL    THC 50 URINE NEG <=50 ng/mL    TRAMADOL NEG <=200 ng/mL    PH URINE 7.6 (H) 5.0 - 7.0    CREAT UR 98 >=20 mg/dL    MASS SPECTROMETRY URINE See Below          If you have any further questions or problems contact my office at  [phone].    Thank you,    Andres Flor MD

## 2018-07-24 NOTE — PROGRESS NOTES
Patient Information     Patient Name  Km Freedman MRN  [id number] Sex  Male   1955      Letter by Andres Flor MD at      Author:  Andres Flor MD Service:  (none) Author Type:  (none)    Filed:   Encounter Date:  2017 Status:  (Other)           Km Freedman  [address]  [place] [address]          2017    Dear Mr. Freedman:    Following are the tests completed during your last clinic visit.  The results of these tests are normal and require no further attention unless otherwise noted.    Results for orders placed or performed in visit on 17      TESTOSTERONE,TOTAL      Result  Value Ref Range    TESTOSTERONE,TOTAL 518.1 175.0 - 781.0 ng/dL   HEMATOCRIT      Result  Value Ref Range    HEMATOCRIT                54.0 (H) 37.0 - 53.0 %   PSA TOTAL (DIAGNOSTIC)      Result  Value Ref Range    PSA TOTAL (DIAGNOSTIC) 0.875 <=3.100 ng/mL   BASIC METABOLIC PANEL      Result  Value Ref Range    SODIUM 136 133 - 143 mmol/L    POTASSIUM 4.2 3.5 - 5.1 mmol/L    CHLORIDE 100 98 - 107 mmol/L    CO2,TOTAL 28 21 - 31 mmol/L    ANION GAP 8 5 - 18                    GLUCOSE 94 70 - 105 mg/dL    CALCIUM 9.5 8.6 - 10.3 mg/dL    BUN 19 7 - 25 mg/dL    CREATININE 1.05 0.70 - 1.30 mg/dL    BUN/CREAT RATIO           18                    GFR if African American >60 >60 ml/min/1.73m2    GFR if not African American >60 >60 ml/min/1.73m2   LYME SCREEN W/REFLEX      Result  Value Ref Range    LYME SCREEN W/REFLEX WEST BLOT Negative Negative   TSH      Result  Value Ref Range    TSH 7.64 (H) 0.34 - 5.60 uIU/mL   CK TOTAL      Result  Value Ref Range    CK,TOTAL 293 (H) 30 - 223 IU/L   DRUG SCREEN - PAIN MANAGEMENT - TOXASSURE      Result  Value Ref Range    6-MONOACETYL MORPHINE NEG NEG ng/mL    AMPHETAMINE URINE NEG <=500 ng/mL    BARBITURATE URINE NEG <=200 ng/mL    BENZODIAZEPINE URINE NEG <=200 ng/mL    BUPRENORPHRINE URINE NEG <=5 ng/mL    COCAINE METAB URINE NEG <=300 ng/mL     ETHYLGLUCURONIDE URINE NEG <=250 ng/mL    FENTANYL URINE NEG <=4 ng/mL    METHADONE URINE POS (A) <=300 ng/mL    OPIATES URINE NEG <=300 ng/mL    OXYCODONE URINE NEG <=100 ng/mL    PROPOXYPHENE URINE NEG <=300 ng/mL    THC 50 URINE NEG <=50 ng/mL    TRAMADOL NEG <=200 ng/mL    PH URINE 6.9 5.0 - 7.0    CREAT  >=20 mg/dL    MASS SPECTROMETRY URINE See Below        If you have any further questions or problems contact my office at  [phone].    Thank you,    Andres Flor MD

## 2018-08-13 ENCOUNTER — OFFICE VISIT (OUTPATIENT)
Dept: FAMILY MEDICINE | Facility: OTHER | Age: 63
End: 2018-08-13
Attending: FAMILY MEDICINE
Payer: COMMERCIAL

## 2018-08-13 VITALS
WEIGHT: 168.4 LBS | DIASTOLIC BLOOD PRESSURE: 70 MMHG | SYSTOLIC BLOOD PRESSURE: 126 MMHG | BODY MASS INDEX: 24.87 KG/M2 | RESPIRATION RATE: 16 BRPM

## 2018-08-13 DIAGNOSIS — M51.379 DEGENERATION OF LUMBAR OR LUMBOSACRAL INTERVERTEBRAL DISC: Primary | ICD-10-CM

## 2018-08-13 PROBLEM — M79.609 PAIN IN LIMB: Status: RESOLVED | Noted: 2018-02-13 | Resolved: 2018-08-13

## 2018-08-13 PROCEDURE — 99213 OFFICE O/P EST LOW 20 MIN: CPT | Performed by: FAMILY MEDICINE

## 2018-08-13 ASSESSMENT — ENCOUNTER SYMPTOMS
SLEEP DISTURBANCE: 0
FATIGUE: 0
DIAPHORESIS: 0
BACK PAIN: 1
MYALGIAS: 1

## 2018-08-13 ASSESSMENT — ANXIETY QUESTIONNAIRES
GAD7 TOTAL SCORE: 0
5. BEING SO RESTLESS THAT IT IS HARD TO SIT STILL: NOT AT ALL
1. FEELING NERVOUS, ANXIOUS, OR ON EDGE: NOT AT ALL
2. NOT BEING ABLE TO STOP OR CONTROL WORRYING: NOT AT ALL
6. BECOMING EASILY ANNOYED OR IRRITABLE: NOT AT ALL
3. WORRYING TOO MUCH ABOUT DIFFERENT THINGS: NOT AT ALL
IF YOU CHECKED OFF ANY PROBLEMS ON THIS QUESTIONNAIRE, HOW DIFFICULT HAVE THESE PROBLEMS MADE IT FOR YOU TO DO YOUR WORK, TAKE CARE OF THINGS AT HOME, OR GET ALONG WITH OTHER PEOPLE: NOT DIFFICULT AT ALL
7. FEELING AFRAID AS IF SOMETHING AWFUL MIGHT HAPPEN: NOT AT ALL

## 2018-08-13 ASSESSMENT — PAIN SCALES - GENERAL: PAINLEVEL: MODERATE PAIN (4)

## 2018-08-13 ASSESSMENT — PATIENT HEALTH QUESTIONNAIRE - PHQ9: 5. POOR APPETITE OR OVEREATING: NOT AT ALL

## 2018-08-13 NOTE — MR AVS SNAPSHOT
"              After Visit Summary   8/13/2018    Km Freedman    MRN: [id number]           Patient Information     Date Of Birth          1955        Visit Information        Provider Department      8/13/2018 8:15 AM Andres Flor MD [facility]        Today's Diagnoses     Degeneration of lumbar or lumbosacral intervertebral disc    -  1       Follow-ups after your visit        Follow-up notes from your care team     Return in about 6 months (around 2/13/2019).      Your next 10 appointments already scheduled     Aug 23, 2018  8:45 AM CDT   [facility] Turner [facility]   [facility]    [address]  [facility] [address] [phone]              Who to contact     If you have questions or need follow up information about today's clinic visit or your schedule please contact [facility] directly at [phone].  Normal or non-critical lab and imaging results will be communicated to you by SNRLabshart, letter or phone within 4 business days after the clinic has received the results. If you do not hear from us within 7 days, please contact the clinic through [employer] or phone. If you have a critical or abnormal lab result, we will notify you by phone as soon as possible.  Submit refill requests through [employer] or call your pharmacy and they will forward the refill request to us. Please allow 3 business days for your refill to be completed.          Additional Information About Your Visit        SNRLabshart Information     [employer] lets you send messages to your doctor, view your test results, renew your prescriptions, schedule appointments and more. To sign up, go to [email or web address] . Click on \"Log in\" on the left side of the screen, which will take you to the Welcome page. Then click on \"Sign up Now\" on the right side of the page.     You will be asked to enter the access code listed below, " as well as some personal information. Please follow the directions to create your username and password.     Your access code is: [email or web address]  Expires: 2018  8:32 AM     Your access code will  in 90 days. If you need help or a new code, please call your [place] clinic or [phone].        Care EveryWhere ID     This is your Care EveryWhere ID. This could be used by other organizations to access your [place] medical records  [id number]        Your Vitals Were     Respirations BMI (Body Mass Index)                16 24.87 kg/m2           Blood Pressure from Last 3 Encounters:   18 126/70   18 128/82   18 128/70    Weight from Last 3 Encounters:   18 168 lb 6.4 oz (76.4 kg)   18 170 lb (77.1 kg)   18 170 lb (77.1 kg)              Today, you had the following     No orders found for display       Primary Care Provider Office Phone # Fax #    Andres Flor [phone]       [address] [facility] [address]        Equal Access to Services     [facility] : Hadii aad ku hadasho Sotiaraali, waaxda luqadaha, qaybta kaalmada adegovindyada, alan sanchez. So [facility] [phone].    ATENCIÓN: Si habla español, tiene a griffith disposición servicios gratuitos de asistencia lingüística. Llame al [phone].    We comply with applicable federal civil rights laws and [place] laws. We do not discriminate on the basis of race, color, national origin, age, disability, sex, sexual orientation, or gender identity.            Thank you!     Thank you for choosing [facility]  for your care. Our goal is always to provide you with excellent care. Hearing back from our patients is one way we can continue to improve our services. Please take a few minutes to complete the written survey that you may receive in the mail after your visit with us. Thank you!             Your Updated Medication List - Protect others around  you: Learn how to safely use, store and throw away your medicines at www.disposemymeds.org.          This list is accurate as of 8/13/18  8:32 AM.  Always use your most recent med list.                   Brand Name Dispense Instructions for use Diagnosis    lisinopril-hydrochlorothiazide 10-12.5 MG per tablet    PRINZIDE/ZESTORETIC     Take 1 tablet by mouth daily        methadone 10 MG tablet    DOLOPHINE    30 tablet    Take 1 tablet (10 mg) by mouth daily .Fill on / after 7/22/18    Degeneration of lumbar or lumbosacral intervertebral disc       sertraline 50 MG tablet    ZOLOFT    90 tablet    TAKE 1 TABLET BY MOUTH EVERY DAY    Depression, major, recurrent, mild (H)       triamcinolone 0.1 % cream    KENALOG

## 2018-08-13 NOTE — PROGRESS NOTES
SUBJECTIVE:   Km Freedman is a 63 year old male who presents to clinic today for the following health issues:    HPI  Follow-up on methadone.  He has started medicinal THC, for about 3 weeks.  He feels it is helping him to a moderate degree.  His goal is to come off methadone now.  Has been cutting back, with the drops in dosing he gets some increased lower extremity pains.  Has been down to 1/2 a pill lately and even stopped completely for 4 days.  He is sleeping better now on the THC, getting over 4 hours now without getting up, had been only 2.    Patient Active Problem List    Diagnosis Date Noted     Arthropathy 02/13/2018     Priority: Medium     Overview:   Degenerative facet arthritis in the low back       Chondromalacia patellae 02/13/2018     Priority: Medium     Overview:   Degenerative arthritis, chondromalacia patella left knee       Depression, major, recurrent, mild (H) 02/13/2018     Priority: Medium     Degeneration of lumbar or lumbosacral intervertebral disc 02/13/2018     Priority: Medium     Hypercholesterolemia 02/13/2018     Priority: Medium     Lateral epicondylitis of right elbow 02/13/2018     Priority: Medium     Leg cramps 02/13/2018     Priority: Medium     Overview:   Chronic       Pain in limb 02/13/2018     Priority: Medium     Neck pain, chronic 02/13/2018     Priority: Medium     Overview:   more prominent on the right       Restless leg syndrome 02/13/2018     Priority: Medium     Muscle pain 08/31/2017     Priority: Medium     Elevated CPK 08/24/2017     Priority: Medium     Bilateral calf pain 08/11/2017     Priority: Medium     Hypogonadism in male 04/25/2016     Priority: Medium     Failed back syndrome of lumbar spine 03/07/2016     Priority: Medium     Medication management 12/07/2015     Priority: Medium     Trochanteric bursitis of right hip 10/07/2015     Priority: Medium     HTN (hypertension) 10/30/2014     Priority: Medium     Pain medication agreement 12/16/2013      Priority: Medium     Overview:   Methadone 10 mg #30.   Signed 05/17/2013, UPDATED 1/2014, updated 3/7/2016       Enthesopathy of hip region 05/02/2011     Priority: Medium     Esophageal reflux 12/30/2009     Priority: Medium     Past Surgical History:   Procedure Laterality Date     FUSION LUMBAR ANTERIOR ONE LEVEL      1999,L4-L5     FUSION LUMBAR ANTERIOR ONE LEVEL      01/24/01,L5-S1 discectomy with anterior interbody fusion L4-5     OTHER SURGICAL HISTORY      1998,[id number],LUMBAR DISKECTOMY,L5-S1     OTHER SURGICAL HISTORY      2000,670612,OTHER,left medial branch     OTHER SURGICAL HISTORY      2001,924721,OTHER,Has had 2 rhizomoties     Social History   Substance Use Topics     Smoking status: Never Smoker     Smokeless tobacco: Never Used     Alcohol use No     Social History     Social History Narrative    Former  and , currently unable to work.  , has a son and a daughter alive and well.  On SSDI  Preloaded 11-  Updated 4/2/2013.     Current Outpatient Prescriptions   Medication Sig Dispense Refill     lisinopril-hydrochlorothiazide (PRINZIDE/ZESTORETIC) 10-12.5 MG per tablet Take 1 tablet by mouth daily       methadone (DOLOPHINE) 10 MG tablet Take 1 tablet (10 mg) by mouth daily .Fill on / after 7/22/18 30 tablet 0     sertraline (ZOLOFT) 50 MG tablet TAKE 1 TABLET BY MOUTH EVERY DAY 90 tablet 1     triamcinolone (KENALOG) 0.1 % cream        No Known Allergies    Review of Systems   Constitutional: Negative for diaphoresis and fatigue.   Musculoskeletal: Positive for back pain and myalgias.   Psychiatric/Behavioral: Negative for sleep disturbance.        OBJECTIVE:     /70 (BP Location: Right arm, Patient Position: Sitting, Cuff Size: Adult Regular)  Resp 16  Wt 168 lb 6.4 oz (76.4 kg)  BMI 24.87 kg/m2  Body mass index is 24.87 kg/(m^2).  Physical Exam   Constitutional: He is oriented to person, place, and time. He appears well-developed. No distress.    Neurological: He is alert and oriented to person, place, and time.   Skin: He is not diaphoretic.   Psychiatric: He has a normal mood and affect. His behavior is normal. Thought content normal.       Diagnostic Test Results:  none     ASSESSMENT/PLAN:         (M51.37) Degeneration of lumbar or lumbosacral intervertebral disc  (primary encounter diagnosis)  Comment: stable  Plan: He has tabs of methadone at home, and intends to finish these up and then stop.  Will be using 2.5 milligram daily.  15/15 minutes counseling going over what to expect from withdrawl side effects.  Follow up with me as needed then on this problem.      Andres Flor MD  [facility]

## 2018-08-14 ASSESSMENT — ANXIETY QUESTIONNAIRES: GAD7 TOTAL SCORE: 0

## 2018-08-23 ENCOUNTER — OFFICE VISIT (OUTPATIENT)
Dept: UROLOGY | Facility: OTHER | Age: 63
End: 2018-08-23
Attending: UROLOGY
Payer: COMMERCIAL

## 2018-08-23 VITALS
WEIGHT: 167.2 LBS | RESPIRATION RATE: 14 BRPM | DIASTOLIC BLOOD PRESSURE: 70 MMHG | BODY MASS INDEX: 24.69 KG/M2 | SYSTOLIC BLOOD PRESSURE: 122 MMHG

## 2018-08-23 DIAGNOSIS — R10.2 PELVIC PAIN IN MALE: ICD-10-CM

## 2018-08-23 DIAGNOSIS — R39.198 SLOWING OF URINARY STREAM: ICD-10-CM

## 2018-08-23 DIAGNOSIS — E29.1 HYPOGONADISM IN MALE: Primary | ICD-10-CM

## 2018-08-23 PROCEDURE — 96401 CHEMO ANTI-NEOPL SQ/IM: CPT

## 2018-08-23 PROCEDURE — 99213 OFFICE O/P EST LOW 20 MIN: CPT | Performed by: UROLOGY

## 2018-08-23 PROCEDURE — 96372 THER/PROPH/DIAG INJ SC/IM: CPT

## 2018-08-23 PROCEDURE — 25000128 H RX IP 250 OP 636: Performed by: UROLOGY

## 2018-08-23 RX ADMIN — TESTOSTERONE UNDECANOATE 750 MG: 250 INJECTION INTRAMUSCULAR at 08:52

## 2018-08-23 ASSESSMENT — PAIN SCALES - GENERAL: PAINLEVEL: MODERATE PAIN (4)

## 2018-08-23 NOTE — MR AVS SNAPSHOT
After Visit Summary   8/23/2018    Km Freedman    MRN: [id number]           Patient Information     Date Of Birth          1955        Visit Information        Provider Department      8/23/2018 8:45 AM Peter Turner MD [facility]        Today's Diagnoses     Hypogonadism in male    -  1    Slowing of urinary stream        Pelvic pain in male           Follow-ups after your visit        Your next 10 appointments already scheduled     Oct 18, 2018  9:00 AM CDT   LAB with GH LAB   [facility]    [address] [facility] [address]   [phone]           Please do not eat 10-12 hours before your appointment if you are coming in fasting for labs on lipids, cholesterol, or glucose (sugar). This does not apply to pregnant women. Water, hot tea and black coffee (with nothing added) are okay. Do not drink other fluids, diet soda or chew gum.            Nov 01, 2018  8:45 AM CDT   Return Visit with Peter Turner MD   [facility] ([facility]    [address]  [place] [address] [phone]              Future tests that were ordered for you today     Open Future Orders        Priority Expected Expires Ordered    Testosterone total Routine 10/11/2018 8/22/2019 8/23/2018    Hematocrit Routine 10/11/2018 8/22/2019 8/23/2018            Who to contact     If you have questions or need follow up information about today's clinic visit or your schedule please contact [facility] directly at [phone].  Normal or non-critical lab and imaging results will be communicated to you by MyChart, letter or phone within 4 business days after the clinic has received the results. If you do not hear from us within 7 days, please contact the clinic through MyChart or phone. If you have a critical or abnormal lab result, we will notify you by phone as  "soon as possible.  Submit refill requests through [employer] or call your pharmacy and they will forward the refill request to us. Please allow 3 business days for your refill to be completed.          Additional Information About Your Visit        [employer] Information     [employer] lets you send messages to your doctor, view your test results, renew your prescriptions, schedule appointments and more. To sign up, go to [email or web address] . Click on \"Log in\" on the left side of the screen, which will take you to the Welcome page. Then click on \"Sign up Now\" on the right side of the page.     You will be asked to enter the access code listed below, as well as some personal information. Please follow the directions to create your username and password.     Your access code is: [email or web address]  Expires: 2018  8:32 AM     Your access code will  in 90 days. If you need help or a new code, please call your [place] clinic or [phone].        Care EveryWhere ID     This is your Care EveryWhere ID. This could be used by other organizations to access your [place] medical records  [id number]        Your Vitals Were     Respirations BMI (Body Mass Index)                14 24.69 kg/m2           Blood Pressure from Last 3 Encounters:   18 122/70   18 126/70   18 128/82    Weight from Last 3 Encounters:   18 75.8 kg (167 lb 3.2 oz)   18 76.4 kg (168 lb 6.4 oz)   18 77.1 kg (170 lb)               Primary Care Provider Office Phone # Fax #    Andres Flor [phone] [address] [facility] [address]        Equal Access to Services     [facility] MAINE : Sera Cardenas, jud gómez, john ruvalcaba, alan sanchez. So [facility] [phone].    ATENCIÓN: Si habla español, tiene a griffith disposición servicios gratuitos de asistencia lingüística. Emmanuel al [phone].    We comply with applicable federal " civil rights laws and [place] laws. We do not discriminate on the basis of race, color, national origin, age, disability, sex, sexual orientation, or gender identity.            Thank you!     Thank you for choosing [facility] AND [facility]  for your care. Our goal is always to provide you with excellent care. Hearing back from our patients is one way we can continue to improve our services. Please take a few minutes to complete the written survey that you may receive in the mail after your visit with us. Thank you!             Your Updated Medication List - Protect others around you: Learn how to safely use, store and throw away your medicines at www.disposemymeds.org.          This list is accurate as of 8/23/18  9:25 AM.  Always use your most recent med list.                   Brand Name Dispense Instructions for use Diagnosis    lisinopril-hydrochlorothiazide 10-12.5 MG per tablet    PRINZIDE/ZESTORETIC     Take 1 tablet by mouth daily        medical cannabis (Patient's own supply.  Not a prescription)      1 Dose See Admin Instructions (This is NOT a prescription, and does not certify that the patient has a qualifying medical condition for medical cannabis.  The purpose of this order is  to document that the patient reports taking medical cannabis.)        methadone 10 MG tablet    DOLOPHINE    30 tablet    Take 1 tablet (10 mg) by mouth daily .Fill on / after 7/22/18    Degeneration of lumbar or lumbosacral intervertebral disc       sertraline 50 MG tablet    ZOLOFT    90 tablet    TAKE 1 TABLET BY MOUTH EVERY DAY    Depression, major, recurrent, mild (H)       triamcinolone 0.1 % cream    KENALOG

## 2018-08-23 NOTE — PROGRESS NOTES
Type of Visit  EST    Chief Complaint  Hypogonadism    HPI  Mr. Freedman is a 63 y.o. male who follows up with symptomatic hypogonadism.  Primary symptoms include low energy and poor sleep.  He has been on testosterone replacement for over 5 years.  He previously failed patches and short acting injections.  He is doing well with Aveed.  Because up for an injection today.  He has recently started medical cannabis with good results.  He denies acne or mood swings.      Review of Systems  I reviewed the ROS with the patient today.    Nursing Notes:   Batsheva Sheriff LPN  8/23/2018  8:40 AM  Signed  Pt presents to clinic for his Aveed injection  Review of Systems:    Weight loss:    No     Recent fever/chills:  No   Night sweats:   No  Current skin rash:  No   Recent hair loss:  No  Heat intolerance:  No   Cold intolerance:  No  Chest pain:   No   Palpitations:   No  Shortness of breath:  No   Wheezing:   No  Constipation:    No   Diarrhea:   No   Nausea:   No   Vomiting:   No   Kidney/side pain:  No   Back pain:   No  Frequent headaches:  No   Dizziness:     No  Leg swelling:   No   Calf pain:    No    Parents, brothers or sisters with history of kidney cancer:   No  Parents, brothers or sisters with history of bladder cancer: No  Father or brother with history of prostate cancer:  No      Physical Exam  /70 (BP Location: Left arm, Patient Position: Sitting, Cuff Size: Adult Regular)  Resp 14  Wt 75.8 kg (167 lb 3.2 oz)  BMI 24.69 kg/m2  Constitutional: No acute distress.  Alert and cooperative   Head: NCAT  Eyes: Conjunctivae normal  Cardiovascular: Regular rate.  Pulmonary/Chest: Respirations are even and non-labored bilaterally, no audible wheezing  Abdominal: Soft. No distension, tenderness, masses or guarding.   Extremities: GILDARDO x 4, Warm. No clubbing.  No cyanosis.    Skin: Pink, warm and dry.  No visible rashes noted.  Back:  No left CVA tenderness.  No right CVA tenderness.  Genitourinary:   Nonpalpable bladder    Labs  CREATININE (mg/dL)   Date Value   01/11/2018 1.13     Results for TEJINDER JONES (MRN [id number]) as of 4/5/2018 07:57   3/22/2018 08:01   Hematocrit 53.2 (H)   Testosterone Total 398     Results for TEJINDER JONES (MRN [id number]) as of 9/22/2016 09:15   3/7/2016 09:16   TESTOSTERONE,TOTAL 104.4 (L)     Results for TEJINDER JONES (MRN [id number]) as of 9/7/2017 09:37   9/7/2016 08:55 1/26/2017 08:33 8/2/2017 09:11   PSA TOTAL (DIAGNOSTIC) 0.739 0.867 0.875     Testosterone Injection  Today the patient received an injection of testosterone undecanoate 750mg.  This was given in the gluteus.  The results of this injection: None  Patient was monitored for 30 minutes following the injection.    Assessment  Hypogonadism- continue Aveed q10 weeks  We are getting labs every third cycle.    Plan  Aveed 750mg IM every 10 weeks   - He is next due for labs in 8 weeks (lab only)

## 2018-08-23 NOTE — NURSING NOTE
Pt presents to clinic for his Aveed injection  Review of Systems:    Weight loss:    No     Recent fever/chills:  No   Night sweats:   No  Current skin rash:  No   Recent hair loss:  No  Heat intolerance:  No   Cold intolerance:  No  Chest pain:   No   Palpitations:   No  Shortness of breath:  No   Wheezing:   No  Constipation:    No   Diarrhea:   No   Nausea:   No   Vomiting:   No   Kidney/side pain:  No   Back pain:   No  Frequent headaches:  No   Dizziness:     No  Leg swelling:   No   Calf pain:    No    Parents, brothers or sisters with history of kidney cancer:   No  Parents, brothers or sisters with history of bladder cancer: No  Father or brother with history of prostate cancer:  No

## 2018-09-13 ENCOUNTER — TELEPHONE (OUTPATIENT)
Dept: FAMILY MEDICINE | Facility: OTHER | Age: 63
End: 2018-09-13

## 2018-09-13 NOTE — TELEPHONE ENCOUNTER
Patient would like a appointment as soon as possible, early as possible, for ongoing pain. Patient prefers around 7:00 am.

## 2018-09-27 ENCOUNTER — OFFICE VISIT (OUTPATIENT)
Dept: FAMILY MEDICINE | Facility: OTHER | Age: 63
End: 2018-09-27
Attending: FAMILY MEDICINE
Payer: COMMERCIAL

## 2018-09-27 VITALS
WEIGHT: 165 LBS | SYSTOLIC BLOOD PRESSURE: 130 MMHG | BODY MASS INDEX: 24.37 KG/M2 | DIASTOLIC BLOOD PRESSURE: 72 MMHG | RESPIRATION RATE: 16 BRPM

## 2018-09-27 DIAGNOSIS — I10 ESSENTIAL HYPERTENSION: ICD-10-CM

## 2018-09-27 DIAGNOSIS — F33.0 DEPRESSION, MAJOR, RECURRENT, MILD (H): ICD-10-CM

## 2018-09-27 DIAGNOSIS — M96.1 FAILED BACK SYNDROME OF LUMBAR SPINE: ICD-10-CM

## 2018-09-27 DIAGNOSIS — M51.379 DEGENERATION OF LUMBAR OR LUMBOSACRAL INTERVERTEBRAL DISC: Primary | ICD-10-CM

## 2018-09-27 PROCEDURE — 99213 OFFICE O/P EST LOW 20 MIN: CPT | Performed by: FAMILY MEDICINE

## 2018-09-27 RX ORDER — TRAMADOL HYDROCHLORIDE 50 MG/1
50 TABLET ORAL EVERY 6 HOURS PRN
Qty: 30 TABLET | Refills: 5 | Status: SHIPPED | OUTPATIENT
Start: 2018-09-27 | End: 2018-12-06

## 2018-09-27 RX ORDER — LISINOPRIL/HYDROCHLOROTHIAZIDE 10-12.5 MG
1 TABLET ORAL DAILY
Qty: 90 TABLET | Refills: 3 | Status: SHIPPED | OUTPATIENT
Start: 2018-09-27 | End: 2019-08-13

## 2018-09-27 ASSESSMENT — ANXIETY QUESTIONNAIRES
6. BECOMING EASILY ANNOYED OR IRRITABLE: NOT AT ALL
7. FEELING AFRAID AS IF SOMETHING AWFUL MIGHT HAPPEN: NOT AT ALL
1. FEELING NERVOUS, ANXIOUS, OR ON EDGE: NOT AT ALL
2. NOT BEING ABLE TO STOP OR CONTROL WORRYING: NOT AT ALL
3. WORRYING TOO MUCH ABOUT DIFFERENT THINGS: NOT AT ALL
IF YOU CHECKED OFF ANY PROBLEMS ON THIS QUESTIONNAIRE, HOW DIFFICULT HAVE THESE PROBLEMS MADE IT FOR YOU TO DO YOUR WORK, TAKE CARE OF THINGS AT HOME, OR GET ALONG WITH OTHER PEOPLE: NOT DIFFICULT AT ALL
5. BEING SO RESTLESS THAT IT IS HARD TO SIT STILL: NOT AT ALL
GAD7 TOTAL SCORE: 0

## 2018-09-27 ASSESSMENT — PATIENT HEALTH QUESTIONNAIRE - PHQ9: 5. POOR APPETITE OR OVEREATING: NOT AT ALL

## 2018-09-27 ASSESSMENT — ENCOUNTER SYMPTOMS
SLEEP DISTURBANCE: 0
BACK PAIN: 1
FATIGUE: 0

## 2018-09-27 ASSESSMENT — PAIN SCALES - GENERAL: PAINLEVEL: MILD PAIN (3)

## 2018-09-27 NOTE — MR AVS SNAPSHOT
After Visit Summary   9/27/2018    Km Freedman    MRN: [id number]           Patient Information     Date Of Birth          1955        Visit Information        Provider Department      9/27/2018 11:00 AM Andres Flor MD [facility]        Today's Diagnoses     Degeneration of lumbar or lumbosacral intervertebral disc    -  1    Failed back syndrome of lumbar spine        Depression, major, recurrent, mild (H)        Essential hypertension           Follow-ups after your visit        Follow-up notes from your care team     Return in about 6 months (around 3/27/2019).      Your next 10 appointments already scheduled     Oct 18, 2018  9:00 AM CDT   LAB with GH LAB   [facility]    [address] [facility] [id number] [phone]           Please do not eat 10-12 hours before your appointment if you are coming in fasting for labs on lipids, cholesterol, or glucose (sugar). This does not apply to pregnant women. Water, hot tea and black coffee (with nothing added) are okay. Do not drink other fluids, diet soda or chew gum.            Nov 01, 2018  8:45 AM CDT   Return Visit with Peter Turner MD   [facility] ([facility]    [address]  [place] [address] [phone]              Who to contact     If you have questions or need follow up information about today's clinic visit or your schedule please contact [facility] directly at [phone].  Normal or non-critical lab and imaging results will be communicated to you by MyChart, letter or phone within 4 business days after the clinic has received the results. If you do not hear from us within 7 days, please contact the clinic through MyChart or phone. If you have a critical or abnormal lab result, we will notify you by phone as soon as possible.  Submit refill requests  "through [employer] or call your pharmacy and they will forward the refill request to us. Please allow 3 business days for your refill to be completed.          Additional Information About Your Visit        [employer] Patient CareharMightyQuiz Information     [employer] lets you send messages to your doctor, view your test results, renew your prescriptions, schedule appointments and more. To sign up, go to [email or web address] . Click on \"Log in\" on the left side of the screen, which will take you to the Welcome page. Then click on \"Sign up Now\" on the right side of the page.     You will be asked to enter the access code listed below, as well as some personal information. Please follow the directions to create your username and password.     Your access code is: [email or web address]  Expires: 2018  8:32 AM     Your access code will  in 90 days. If you need help or a new code, please call your [place] clinic or [phone].        Care EveryWhere ID     This is your Care EveryWhere ID. This could be used by other organizations to access your [place] medical records  [id number]        Your Vitals Were     Respirations BMI (Body Mass Index)                16 24.37 kg/m2           Blood Pressure from Last 3 Encounters:   18 130/72   18 122/70   18 126/70    Weight from Last 3 Encounters:   18 165 lb (74.8 kg)   18 167 lb 3.2 oz (75.8 kg)   18 168 lb 6.4 oz (76.4 kg)              Today, you had the following     No orders found for display         Today's Medication Changes          These changes are accurate as of 18 11:26 AM.  If you have any questions, ask your nurse or doctor.               Start taking these medicines.        Dose/Directions    traMADol 50 MG tablet   Commonly known as:  ULTRAM   Used for:  Degeneration of lumbar or lumbosacral intervertebral disc, Failed back syndrome of lumbar spine   Started by:  Andres Flor MD        Dose:  50 mg   Take 1 tablet (50 mg) by mouth every 6 " hours as needed for severe pain   Quantity:  30 tablet   Refills:  5         These medicines have changed or have updated prescriptions.        Dose/Directions    sertraline 50 MG tablet   Commonly known as:  ZOLOFT   This may have changed:  See the new instructions.   Used for:  Depression, major, recurrent, mild (H)   Changed by:  Andres Flor MD        Dose:  50 mg   Take 1 tablet (50 mg) by mouth daily   Quantity:  90 tablet   Refills:  3         Stop taking these medicines if you haven't already. Please contact your care team if you have questions.     methadone 10 MG tablet   Commonly known as:  DOLOPHINE   Stopped by:  Andres Flor MD                Where to get your medicines      These medications were sent to [id number] ([facility] - [place] - [address]  [address] [facility] [address]     Phone:  [phone]     lisinopril-hydrochlorothiazide 10-12.5 MG per tablet    sertraline 50 MG tablet         Some of these will need a paper prescription and others can be bought over the counter.  Ask your nurse if you have questions.     Bring a paper prescription for each of these medications     traMADol 50 MG tablet               Information about OPIOIDS     PRESCRIPTION OPIOIDS: WHAT YOU NEED TO KNOW   We gave you an opioid (narcotic) pain medicine. It is important to manage your pain, but opioids are not always the best choice. You should first try all the other options your care team gave you. Take this medicine for as short a time (and as few doses) as possible.    Some activities can increase your pain, such as bandage changes or therapy sessions. It may help to take your pain medicine 30 to 60 minutes before these activities. Reduce your stress by getting enough sleep, working on hobbies you enjoy and practicing relaxation or meditation. Talk to your care team about ways to manage your pain beyond prescription opioids.    These medicines have  risks:    DO NOT drive when on new or higher doses of pain medicine. These medicines can affect your alertness and reaction times, and you could be arrested for driving under the influence (DUI). If you need to use opioids long-term, talk to your care team about driving.    DO NOT operate heavy machinery    DO NOT do any other dangerous activities while taking these medicines.    DO NOT drink any alcohol while taking these medicines.     If the opioid prescribed includes acetaminophen, DO NOT take with any other medicines that contain acetaminophen. Read all labels carefully. Look for the word  acetaminophen  or  Tylenol.  Ask your pharmacist if you have questions or are unsure.    You can get addicted to pain medicines, especially if you have a history of addiction (chemical, alcohol or substance dependence). Talk to your care team about ways to reduce this risk.    All opioids tend to cause constipation. Drink plenty of water and eat foods that have a lot of fiber, such as fruits, vegetables, prune juice, apple juice and high-fiber cereal. Take a laxative (Miralax, milk of magnesia, Colace, Senna) if you don t move your bowels at least every other day. Other side effects include upset stomach, sleepiness, dizziness, throwing up, tolerance (needing more of the medicine to have the same effect), physical dependence and slowed breathing.    Store your pills in a secure place, locked if possible. We will not replace any lost or stolen medicine. If you don t finish your medicine, please throw away (dispose) as directed by your pharmacist. The [place] Pollution Control Agency has more information about safe disposal: [email or web address]         Primary Care Provider Office Phone # Fax #    Andres Flor -[phone] [address] [facility] [address]        Equal Access to Services     [facility] AH: Sera Cardenas  sohamda deisyjian, qaybta kalennox ruvalcaba, alan gillaahang ah. So [facility] [phone].    ATENCIÓN: Si mandeep mojica, tiene a griffith disposición servicios gratuitos de asistencia lingüística. Emmanuel al [phone].    We comply with applicable federal civil rights laws and [place] laws. We do not discriminate on the basis of race, color, national origin, age, disability, sex, sexual orientation, or gender identity.            Thank you!     Thank you for choosing [facility] AND [facility]  for your care. Our goal is always to provide you with excellent care. Hearing back from our patients is one way we can continue to improve our services. Please take a few minutes to complete the written survey that you may receive in the mail after your visit with us. Thank you!             Your Updated Medication List - Protect others around you: Learn how to safely use, store and throw away your medicines at www.disposemymeds.org.          This list is accurate as of 9/27/18 11:26 AM.  Always use your most recent med list.                   Brand Name Dispense Instructions for use Diagnosis    lisinopril-hydrochlorothiazide 10-12.5 MG per tablet    PRINZIDE/ZESTORETIC    90 tablet    Take 1 tablet by mouth daily    Essential hypertension       medical cannabis (Patient's own supply.  Not a prescription)      1 Dose See Admin Instructions (This is NOT a prescription, and does not certify that the patient has a qualifying medical condition for medical cannabis.  The purpose of this order is  to document that the patient reports taking medical cannabis.)        sertraline 50 MG tablet    ZOLOFT    90 tablet    Take 1 tablet (50 mg) by mouth daily    Depression, major, recurrent, mild (H)       traMADol 50 MG tablet    ULTRAM    30 tablet    Take 1 tablet (50 mg) by mouth every 6 hours as needed for severe pain    Degeneration of lumbar or lumbosacral intervertebral disc, Failed back syndrome of lumbar  spine       triamcinolone 0.1 % cream    KENALOG

## 2018-09-27 NOTE — PROGRESS NOTES
SUBJECTIVE:   Km Freedman is a 63 year old male who presents to clinic today for the following health issues:    HPI  Back pain.  He has been tapering off methadone, slowly.  Hope is to stop this completely and wants to go over options for flares.  Usually gets flares with trying to fall asleep.  Is now on medical THC, and this has helped with sleep, no longer tired in the morning.  Still has lots leg pains, both.  Overall, the pains are actually improving.  He feels this is from both tapering off the methadone as well as the THC.      He also wants refills on zoloft and hypertension meds.    Patient Active Problem List    Diagnosis Date Noted     Arthropathy 02/13/2018     Priority: Medium     Overview:   Degenerative facet arthritis in the low back       Chondromalacia patellae 02/13/2018     Priority: Medium     Overview:   Degenerative arthritis, chondromalacia patella left knee       Depression, major, recurrent, mild (H) 02/13/2018     Priority: Medium     Degeneration of lumbar or lumbosacral intervertebral disc 02/13/2018     Priority: Medium     Hypercholesterolemia 02/13/2018     Priority: Medium     Lateral epicondylitis of right elbow 02/13/2018     Priority: Medium     Leg cramps 02/13/2018     Priority: Medium     Overview:   Chronic       Neck pain, chronic 02/13/2018     Priority: Medium     Overview:   more prominent on the right       Restless leg syndrome 02/13/2018     Priority: Medium     Muscle pain 08/31/2017     Priority: Medium     Elevated CPK 08/24/2017     Priority: Medium     Bilateral calf pain 08/11/2017     Priority: Medium     Hypogonadism in male 04/25/2016     Priority: Medium     Failed back syndrome of lumbar spine 03/07/2016     Priority: Medium     Medication management 12/07/2015     Priority: Medium     Trochanteric bursitis of right hip 10/07/2015     Priority: Medium     Essential hypertension 10/30/2014     Priority: Medium     Pain medication agreement 12/16/2013      Priority: Medium     Overview:   Methadone 10 mg #30.   Signed 05/17/2013, UPDATED 1/2014, updated 3/7/2016       Enthesopathy of hip region 05/02/2011     Priority: Medium     Esophageal reflux 12/30/2009     Priority: Medium     Past Surgical History:   Procedure Laterality Date     FUSION LUMBAR ANTERIOR ONE LEVEL      1999,L4-L5     FUSION LUMBAR ANTERIOR ONE LEVEL      01/24/01,L5-S1 discectomy with anterior interbody fusion L4-5     OTHER SURGICAL HISTORY      1998,[id number],LUMBAR DISKECTOMY,L5-S1     OTHER SURGICAL HISTORY      2000,[id number],OTHER,left medial branch     OTHER SURGICAL HISTORY      2001,[id number],OTHER,Has had 2 rhizomoties     Social History   Substance Use Topics     Smoking status: Never Smoker     Smokeless tobacco: Never Used     Alcohol use No     Current Outpatient Prescriptions   Medication Sig Dispense Refill     lisinopril-hydrochlorothiazide (PRINZIDE/ZESTORETIC) 10-12.5 MG per tablet Take 1 tablet by mouth daily       medical cannabis (Patient's own supply.  Not a prescription) 1 Dose See Admin Instructions (This is NOT a prescription, and does not certify that the patient has a qualifying medical condition for medical cannabis.  The purpose of this order is  to document that the patient reports taking medical cannabis.)       methadone (DOLOPHINE) 10 MG tablet Take 1 tablet (10 mg) by mouth daily .Fill on / after 7/22/18 30 tablet 0     sertraline (ZOLOFT) 50 MG tablet TAKE 1 TABLET BY MOUTH EVERY DAY 90 tablet 1     triamcinolone (KENALOG) 0.1 % cream        No Known Allergies    Review of Systems   Constitutional: Negative for fatigue.   Musculoskeletal: Positive for back pain.   Psychiatric/Behavioral: Negative for sleep disturbance.        OBJECTIVE:     /72 (BP Location: Right arm, Patient Position: Sitting, Cuff Size: Adult Regular)  Resp 16  Wt 165 lb (74.8 kg)  BMI 24.37 kg/m2  Body mass index is 24.37 kg/(m^2).  Physical Exam   Constitutional: He is oriented to  person, place, and time. He appears well-developed. No distress.   Musculoskeletal:   No lumbar pain on palpation. Negative straight leg raise    Neurological: He is alert and oriented to person, place, and time.   Skin: He is not diaphoretic.       Diagnostic Test Results:  none     ASSESSMENT/PLAN:         (M51.37) Degeneration of lumbar or lumbosacral intervertebral disc  (primary encounter diagnosis)  Comment: discussed with him options, including OTC tylenol and ibuprofen.  Topicals might help.  Start tramadol now, for rare use, #30 given, 5 refills  Plan: traMADol (ULTRAM) 50 MG tablet             (M96.1) Failed back syndrome of lumbar spine  Comment:    Plan: traMADol (ULTRAM) 50 MG tablet             (F33.0) Depression, major, recurrent, mild (H)  Comment: stable  Plan: sertraline (ZOLOFT) 50 MG tablet             (I10) Essential hypertension  Comment: stable  Plan: lisinopril-hydrochlorothiazide         (PRINZIDE/ZESTORETIC) 10-12.5 MG per tablet        refilled        Andres Flor MD  [facility]

## 2018-09-28 ASSESSMENT — ANXIETY QUESTIONNAIRES: GAD7 TOTAL SCORE: 0

## 2018-10-23 DIAGNOSIS — E29.1 HYPOGONADISM IN MALE: ICD-10-CM

## 2018-10-23 LAB
HCT VFR BLD AUTO: 56.1 % (ref 40–53)
TESTOST SERPL-MCNC: 367 NG/DL (ref 175–781)

## 2018-10-23 PROCEDURE — 36415 COLL VENOUS BLD VENIPUNCTURE: CPT | Performed by: UROLOGY

## 2018-10-23 PROCEDURE — 85014 HEMATOCRIT: CPT | Performed by: UROLOGY

## 2018-10-23 PROCEDURE — 84403 ASSAY OF TOTAL TESTOSTERONE: CPT | Performed by: UROLOGY

## 2018-11-01 ENCOUNTER — ALLIED HEALTH/NURSE VISIT (OUTPATIENT)
Dept: FAMILY MEDICINE | Facility: OTHER | Age: 63
End: 2018-11-01
Attending: FAMILY MEDICINE
Payer: COMMERCIAL

## 2018-11-01 ENCOUNTER — OFFICE VISIT (OUTPATIENT)
Dept: UROLOGY | Facility: OTHER | Age: 63
End: 2018-11-01
Attending: UROLOGY
Payer: COMMERCIAL

## 2018-11-01 VITALS
RESPIRATION RATE: 12 BRPM | BODY MASS INDEX: 23.48 KG/M2 | WEIGHT: 159 LBS | HEART RATE: 72 BPM | DIASTOLIC BLOOD PRESSURE: 82 MMHG | SYSTOLIC BLOOD PRESSURE: 140 MMHG

## 2018-11-01 DIAGNOSIS — Z23 NEED FOR PROPHYLACTIC VACCINATION AND INOCULATION AGAINST INFLUENZA: Primary | ICD-10-CM

## 2018-11-01 DIAGNOSIS — E29.1 HYPOGONADISM IN MALE: Primary | ICD-10-CM

## 2018-11-01 PROCEDURE — 96372 THER/PROPH/DIAG INJ SC/IM: CPT

## 2018-11-01 PROCEDURE — 25000128 H RX IP 250 OP 636: Performed by: UROLOGY

## 2018-11-01 PROCEDURE — 90662 IIV NO PRSV INCREASED AG IM: CPT

## 2018-11-01 PROCEDURE — 90471 IMMUNIZATION ADMIN: CPT

## 2018-11-01 PROCEDURE — 99213 OFFICE O/P EST LOW 20 MIN: CPT | Performed by: UROLOGY

## 2018-11-01 RX ADMIN — TESTOSTERONE UNDECANOATE 750 MG: 250 INJECTION INTRAMUSCULAR at 08:55

## 2018-11-01 ASSESSMENT — PAIN SCALES - GENERAL: PAINLEVEL: MODERATE PAIN (4)

## 2018-11-01 NOTE — PROGRESS NOTES
"Type of Visit  EST    Chief Complaint  Hypogonadism    HPI  Mr. Freedman is a 63 y.o. male who follows up with symptomatic hypogonadism.  Primary symptoms include low energy and poor sleep.  He has been on testosterone replacement for over 5 years.  He previously failed patches and short acting injections.  He is doing well with Aveed.  He has recently started medical cannabis with good results.  This has allowed him to discontinue methadone.  He recently underwent labs and follows up for results.      Review of Systems  I reviewed the ROS with the patient today.    Nursing Notes:   Batsheva Sheriff LPN  11/1/2018  8:57 AM  Signed  Pt presents to clinic for 10 week Aveed injection    Review of Systems:    Weight loss:    No     Recent fever/chills:  No   Night sweats:   No  Current skin rash:  No   Recent hair loss:  No  Heat intolerance:  No   Cold intolerance:  Yes  Chest pain:   No   Palpitations:   No  Shortness of breath:  No   Wheezing:   No  Constipation:    No   Diarrhea:   No   Nausea:   No   Vomiting:   No   Kidney/side pain:  No   Back pain:   Yes  Frequent headaches:  No   Dizziness:     No  Leg swelling:   No   Calf pain:    Yes      Patient given \"Aveed Treatment: A Patient Guide\" form.  Injection given and patient in clinic for 30 minutes after injection for monitoring.  Patient tolerated injection with no problems.  Batsheva Sheriff LPN .........11/1/2018...8:49 AM      Physical Exam  /82 (BP Location: Left arm, Patient Position: Sitting, Cuff Size: Adult Regular)  Pulse 72  Resp 12  Wt 72.1 kg (159 lb)  BMI 23.48 kg/m2  Constitutional: No acute distress.  Alert and cooperative   Head: NCAT  Eyes: Conjunctivae normal  Cardiovascular: Regular rate.  Pulmonary/Chest: Respirations are even and non-labored bilaterally, no audible wheezing  Abdominal: Soft. No distension, tenderness, masses or guarding.   Extremities: GILDARDO x 4, Warm. No clubbing.  No cyanosis.    Skin: Pink, warm and dry.  No " visible rashes noted.  Back:  No left CVA tenderness.  No right CVA tenderness.  Genitourinary:  Nonpalpable bladder    Labs  CREATININE (mg/dL)   Date Value   01/11/2018 1.13     Results for TEJINDER JONES (MRN [id number]) as of 11/1/2018 09:04   3/22/2018 08:01 10/23/2018 09:53   Testosterone Total 398 367   Hematocrit 53.2 (H) 56.1 (H)     Results for TEJINDER JONES (MRN [id number]) as of 9/22/2016 09:15   3/7/2016 09:16   TESTOSTERONE,TOTAL 104.4 (L)     Results for TEJINDER JONES (MRN [id number]) as of 9/7/2017 09:37   9/7/2016 08:55 1/26/2017 08:33 8/2/2017 09:11   PSA TOTAL (DIAGNOSTIC) 0.739 0.867 0.875     Testosterone Injection  Today the patient received an injection of testosterone undecanoate 750mg.  This was given in the gluteus.  The results of this injection: None  Patient was monitored for 30 minutes following the injection.    Assessment  Hypogonadism- continue Aveed q10 weeks  We are getting labs every third cycle.    Plan  Aveed 750mg IM every 10 weeks   - He is next due for labs in 8 weeks (lab only)   - He also agreed as soon as available to donate blood given the slightly elevated HCT

## 2018-11-01 NOTE — NURSING NOTE
"Pt presents to clinic for 10 week Aveed injection    Review of Systems:    Weight loss:    No     Recent fever/chills:  No   Night sweats:   No  Current skin rash:  No   Recent hair loss:  No  Heat intolerance:  No   Cold intolerance:  Yes  Chest pain:   No   Palpitations:   No  Shortness of breath:  No   Wheezing:   No  Constipation:    No   Diarrhea:   No   Nausea:   No   Vomiting:   No   Kidney/side pain:  No   Back pain:   Yes  Frequent headaches:  No   Dizziness:     No  Leg swelling:   No   Calf pain:    Yes      Patient given \"Aveed Treatment: A Patient Guide\" form.  Injection given and patient in clinic for 30 minutes after injection for monitoring.  Patient tolerated injection with no problems.  Batsheva Sheriff LPN .........11/1/2018...8:49 AM    "

## 2018-11-01 NOTE — MR AVS SNAPSHOT
After Visit Summary   11/1/2018    Km Freedman    MRN: [id number]           Patient Information     Date Of Birth          1955        Visit Information        Provider Department      11/1/2018 8:45 AM Peter Turner MD [facility]        Today's Diagnoses     Hypogonadism in male    -  1       Follow-ups after your visit        Your next 10 appointments already scheduled     Henry 10, 2019  8:15 AM CST   Return Visit with Peter Turner MD   [facility] ([facility])    [address]  [place] [id number] [phone]              Who to contact     If you have questions or need follow up information about today's clinic visit or your schedule please contact [facility] directly at [phone].  Normal or non-critical lab and imaging results will be communicated to you by MyChart, letter or phone within 4 business days after the clinic has received the results. If you do not hear from us within 7 days, please contact the clinic through MyChart or phone. If you have a critical or abnormal lab result, we will notify you by phone as soon as possible.  Submit refill requests through [employer] or call your pharmacy and they will forward the refill request to us. Please allow 3 business days for your refill to be completed.          Additional Information About Your Visit        Care EveryWhere ID     This is your Care EveryWhere ID. This could be used by other organizations to access your [facility] medical records  [id number]        Your Vitals Were     Pulse Respirations BMI (Body Mass Index)             72 12 23.48 kg/m2          Blood Pressure from Last 3 Encounters:   11/01/18 140/82   09/27/18 130/72   08/23/18 122/70    Weight from Last 3 Encounters:   11/01/18 72.1 kg (159 lb)   09/27/18 74.8 kg (165 lb)   08/23/18 75.8 kg (167 lb 3.2 oz)              Today, you had the following     No orders  found for display       Primary Care Provider Office Phone # Fax #    Andres Flor [phone] [address]   [place] [address]        Equal Access to Services     CRISSARAH MAINE : Hadii zheng astudillo alishao Ronald, waadwoada luqadaha, qaybta kaalmada peg, alan figueroa jessicagovind adhikari laMelinanohemi sanchez. So [facility] [phone].    ATENCIÓN: Si habla español, tiene a griffith disposición servicios gratuitos de asistencia lingüística. Llame al [phone].    We comply with applicable federal civil rights laws and [place] laws. We do not discriminate on the basis of race, color, national origin, age, disability, sex, sexual orientation, or gender identity.            Thank you!     Thank you for choosing [facility] AND [facility]  for your care. Our goal is always to provide you with excellent care. Hearing back from our patients is one way we can continue to improve our services. Please take a few minutes to complete the written survey that you may receive in the mail after your visit with us. Thank you!             Your Updated Medication List - Protect others around you: Learn how to safely use, store and throw away your medicines at www.disposemymeds.org.          This list is accurate as of 11/1/18 10:29 AM.  Always use your most recent med list.                   Brand Name Dispense Instructions for use Diagnosis    lisinopril-hydrochlorothiazide 10-12.5 MG per tablet    PRINZIDE/ZESTORETIC    90 tablet    Take 1 tablet by mouth daily    Essential hypertension       medical cannabis (Patient's own supply.  Not a prescription)      1 Dose See Admin Instructions (This is NOT a prescription, and does not certify that the patient has a qualifying medical condition for medical cannabis.  The purpose of this order is  to document that the patient reports taking medical cannabis.)        sertraline 50 MG tablet    ZOLOFT    90 tablet    Take 1 tablet (50 mg) by mouth daily    Depression,  major, recurrent, mild (H)       traMADol 50 MG tablet    ULTRAM    30 tablet    Take 1 tablet (50 mg) by mouth every 6 hours as needed for severe pain    Degeneration of lumbar or lumbosacral intervertebral disc, Failed back syndrome of lumbar spine       triamcinolone 0.1 % cream    KENALOG

## 2018-11-01 NOTE — PROGRESS NOTES

## 2018-11-01 NOTE — MR AVS SNAPSHOT
After Visit Summary   11/1/2018    Km Freedman    MRN: [id number]           Patient Information     Date Of Birth          1955        Visit Information        Provider Department      11/1/2018 10:00 AM  [facility]        Today's Diagnoses     Need for prophylactic vaccination and inoculation against influenza    -  1       Follow-ups after your visit        Your next 10 appointments already scheduled     Nov 01, 2018 10:00 AM CDT   Nurse Only with [facility] ([facility])    [address]  [place] [address]   [phone]            Henry 10, 2019  8:15 AM CST   Return Visit with Peter Turner MD   [facility] ([facility])    [address]  [place] [address]   [phone]              Who to contact     If you have questions or need follow up information about today's clinic visit or your schedule please contact [facility] directly at [phone].  Normal or non-critical lab and imaging results will be communicated to you by MyChart, letter or phone within 4 business days after the clinic has received the results. If you do not hear from us within 7 days, please contact the clinic through MyChart or phone. If you have a critical or abnormal lab result, we will notify you by phone as soon as possible.  Submit refill requests through [employer] or call your pharmacy and they will forward the refill request to us. Please allow 3 business days for your refill to be completed.          Additional Information About Your Visit        Care EveryWhere ID     This is your Care EveryWhere ID. This could be used by other organizations to access your [place] [facility] medical records  [id number]         Blood Pressure from Last 3 Encounters:   11/01/18 140/82   09/27/18 130/72   08/23/18 122/70    Weight from Last 3 Encounters:   11/01/18 159  lb (72.1 kg)   09/27/18 165 lb (74.8 kg)   08/23/18 167 lb 3.2 oz (75.8 kg)              We Performed the Following     HC FLU VACCINE, INCREASED ANTIGEN, PRESV FREE     Vaccine Administration, Initial [[id number]]        Primary Care Provider Office Phone # Fax #    Andres Flor [phone]       [address]  [facility] [address]        Equal Access to Services     [facility]: Hadii aad ku hadasho Soomaali, waaxda luqadaha, qaybta kaalmada adeegyada, waxay idiin hayaan adeeg bjopalsusanne duffy . So [facility] [phone].    ATENCIÓN: Si mandeep mojica, tiene a griffith disposición servicios gratuitos de asistencia lingüística. [facility] [phone].    We comply with applicable federal civil rights laws and [place] laws. We do not discriminate on the basis of race, color, national origin, age, disability, sex, sexual orientation, or gender identity.            Thank you!     Thank you for choosing [facility] AND \[facility]\""  for your care. Our goal is always to provide you with excellent care. Hearing back from our patients is one way we can continue to improve our services. Please take a few minutes to complete the written survey that you may receive in the mail after your visit with us. Thank you!             Your Updated Medication List - Protect others around you: Learn how to safely use, store and throw away your medicines at www.disposemymeds.org.          This list is accurate as of 11/1/18  9:37 AM.  Always use your most recent med list.                   Brand Name Dispense Instructions for use Diagnosis    lisinopril-hydrochlorothiazide 10-12.5 MG per tablet    PRINZIDE/ZESTORETIC    90 tablet    Take 1 tablet by mouth daily    Essential hypertension       medical cannabis (Patient's own supply.  Not a prescription)      1 Dose See Admin Instructions (This is NOT a prescription, and does not certify that the patient has a qualifying medical condition for medical cannabis.  The purpose  of this order is  to document that the patient reports taking medical cannabis.)        sertraline 50 MG tablet    ZOLOFT    90 tablet    Take 1 tablet (50 mg) by mouth daily    Depression, major, recurrent, mild (H)       traMADol 50 MG tablet    ULTRAM    30 tablet    Take 1 tablet (50 mg) by mouth every 6 hours as needed for severe pain    Degeneration of lumbar or lumbosacral intervertebral disc, Failed back syndrome of lumbar spine       triamcinolone 0.1 % cream    KENALOG

## 2018-12-06 ENCOUNTER — OFFICE VISIT (OUTPATIENT)
Dept: FAMILY MEDICINE | Facility: OTHER | Age: 63
End: 2018-12-06
Attending: FAMILY MEDICINE
Payer: COMMERCIAL

## 2018-12-06 VITALS
WEIGHT: 161.4 LBS | SYSTOLIC BLOOD PRESSURE: 122 MMHG | DIASTOLIC BLOOD PRESSURE: 80 MMHG | BODY MASS INDEX: 23.83 KG/M2 | HEART RATE: 64 BPM

## 2018-12-06 DIAGNOSIS — M96.1 FAILED BACK SYNDROME OF LUMBAR SPINE: ICD-10-CM

## 2018-12-06 DIAGNOSIS — M51.379 DEGENERATION OF LUMBAR OR LUMBOSACRAL INTERVERTEBRAL DISC: ICD-10-CM

## 2018-12-06 PROCEDURE — 99213 OFFICE O/P EST LOW 20 MIN: CPT | Performed by: FAMILY MEDICINE

## 2018-12-06 RX ORDER — METHADONE HYDROCHLORIDE 10 MG/1
TABLET ORAL
Refills: 0 | COMMUNITY
Start: 2018-07-23 | End: 2018-12-06

## 2018-12-06 RX ORDER — TRAMADOL HYDROCHLORIDE 50 MG/1
50 TABLET ORAL EVERY 6 HOURS PRN
Qty: 60 TABLET | Refills: 5 | Status: SHIPPED | OUTPATIENT
Start: 2018-12-06 | End: 2019-05-16

## 2018-12-06 ASSESSMENT — ANXIETY QUESTIONNAIRES
3. WORRYING TOO MUCH ABOUT DIFFERENT THINGS: NOT AT ALL
GAD7 TOTAL SCORE: 0
4. TROUBLE RELAXING: NOT AT ALL
1. FEELING NERVOUS, ANXIOUS, OR ON EDGE: NOT AT ALL
6. BECOMING EASILY ANNOYED OR IRRITABLE: NOT AT ALL
IF YOU CHECKED OFF ANY PROBLEMS ON THIS QUESTIONNAIRE, HOW DIFFICULT HAVE THESE PROBLEMS MADE IT FOR YOU TO DO YOUR WORK, TAKE CARE OF THINGS AT HOME, OR GET ALONG WITH OTHER PEOPLE: NOT DIFFICULT AT ALL
2. NOT BEING ABLE TO STOP OR CONTROL WORRYING: NOT AT ALL
7. FEELING AFRAID AS IF SOMETHING AWFUL MIGHT HAPPEN: NOT AT ALL
5. BEING SO RESTLESS THAT IT IS HARD TO SIT STILL: NOT AT ALL

## 2018-12-06 ASSESSMENT — PATIENT HEALTH QUESTIONNAIRE - PHQ9: SUM OF ALL RESPONSES TO PHQ QUESTIONS 1-9: 0

## 2018-12-06 ASSESSMENT — ENCOUNTER SYMPTOMS
CONSTIPATION: 0
SEIZURES: 0
FATIGUE: 0
MYALGIAS: 1

## 2018-12-06 ASSESSMENT — PAIN SCALES - GENERAL: PAINLEVEL: MILD PAIN (3)

## 2018-12-06 NOTE — PROGRESS NOTES
SUBJECTIVE:   Km Freedman is a 63 year old male who presents to clinic today for the following health issues:    HPI  Follow up on tramadol.  He was lest seen in September.  Started tramadol  Over the next few weeks he had significant leg aching and used the tramadol 1 twice daily.  Had stopped the medical THC.  Added it back in, and still did not have great relief initially, but eventually it kicked and in currently has pretty good relief.  Today is 3/10 in his legs, muscles only.  Tried a hot tub, walking.  No side effects from the ultram.   He has been off methadone now for 3-4 months.    Patient Active Problem List    Diagnosis Date Noted     Arthropathy 02/13/2018     Priority: Medium     Overview:   Degenerative facet arthritis in the low back       Chondromalacia patellae 02/13/2018     Priority: Medium     Overview:   Degenerative arthritis, chondromalacia patella left knee       Depression, major, recurrent, mild (H) 02/13/2018     Priority: Medium     Degeneration of lumbar or lumbosacral intervertebral disc 02/13/2018     Priority: Medium     Hypercholesterolemia 02/13/2018     Priority: Medium     Lateral epicondylitis of right elbow 02/13/2018     Priority: Medium     Leg cramps 02/13/2018     Priority: Medium     Overview:   Chronic       Neck pain, chronic 02/13/2018     Priority: Medium     Overview:   more prominent on the right       Restless leg syndrome 02/13/2018     Priority: Medium     Muscle pain 08/31/2017     Priority: Medium     Elevated CPK 08/24/2017     Priority: Medium     Bilateral calf pain 08/11/2017     Priority: Medium     Hypogonadism in male 04/25/2016     Priority: Medium     Failed back syndrome of lumbar spine 03/07/2016     Priority: Medium     Medication management 12/07/2015     Priority: Medium     Essential hypertension 10/30/2014     Priority: Medium     Pain medication agreement 12/16/2013     Priority: Medium     Overview:   Methadone 10 mg #30.   Signed  05/17/2013, UPDATED 1/2014, updated 3/7/2016       Enthesopathy of hip region 05/02/2011     Priority: Medium     Esophageal reflux 12/30/2009     Priority: Medium     Past Surgical History:   Procedure Laterality Date     FUSION LUMBAR ANTERIOR ONE LEVEL      1999,L4-L5     FUSION LUMBAR ANTERIOR ONE LEVEL      01/24/01,L5-S1 discectomy with anterior interbody fusion L4-5     OTHER SURGICAL HISTORY      1998,[id number],LUMBAR DISKECTOMY,L5-S1     OTHER SURGICAL HISTORY      2000,[id number],OTHER,left medial branch     OTHER SURGICAL HISTORY      2001,[id number],OTHER,Has had 2 rhizomoties     Social History   Substance Use Topics     Smoking status: Never Smoker     Smokeless tobacco: Never Used     Alcohol use No     Current Outpatient Prescriptions   Medication Sig Dispense Refill     lisinopril-hydrochlorothiazide (PRINZIDE/ZESTORETIC) 10-12.5 MG per tablet Take 1 tablet by mouth daily 90 tablet 3     medical cannabis (Patient's own supply.  Not a prescription) 1 Dose See Admin Instructions (This is NOT a prescription, and does not certify that the patient has a qualifying medical condition for medical cannabis.  The purpose of this order is  to document that the patient reports taking medical cannabis.)       sertraline (ZOLOFT) 50 MG tablet Take 1 tablet (50 mg) by mouth daily 90 tablet 3     traMADol (ULTRAM) 50 MG tablet Take 1 tablet (50 mg) by mouth every 6 hours as needed for severe pain 30 tablet 5     triamcinolone (KENALOG) 0.1 % cream        No Known Allergies    Review of Systems   Constitutional: Negative for fatigue.   Gastrointestinal: Negative for constipation.   Musculoskeletal: Positive for myalgias.   Neurological: Negative for seizures.        OBJECTIVE:     /80 (BP Location: Right arm, Patient Position: Sitting, Cuff Size: Adult Large)  Pulse 64  Wt 161 lb 6.4 oz (73.2 kg)  BMI 23.83 kg/m2  Body mass index is 23.83 kg/(m^2).  Physical Exam   Constitutional: He is oriented to person, place,  and time. He appears well-developed. No distress.   Musculoskeletal:   Negative straight leg raise.  Normal lower extremity strength.     Neurological: He is alert and oriented to person, place, and time.   Skin: He is not diaphoretic.   Psychiatric: He has a normal mood and affect. His behavior is normal. Thought content normal.       Diagnostic Test Results:  none     ASSESSMENT/PLAN:       Km was seen today for recheck medication.    Diagnoses and all orders for this visit:    Degeneration of lumbar or lumbosacral intervertebral disc  -     traMADol (ULTRAM) 50 MG tablet; Take 1 tablet (50 mg) by mouth every 6 hours as needed for severe pain    Failed back syndrome of lumbar spine  -     traMADol (ULTRAM) 50 MG tablet; Take 1 tablet (50 mg) by mouth every 6 hours as needed for severe pain      He was given a new 6 month prescription of ultram.  Discussed with him possible side effects, mostly would be sedation and seizure if he takes more than prescribed.  Has low potential for abuse.  No prior history and has come off methadone on his own. Follow up 6 months.        Andres Flor MD  [facility]

## 2018-12-06 NOTE — MR AVS SNAPSHOT
After Visit Summary   12/6/2018    Km Freedman    MRN: [id number]           Patient Information     Date Of Birth          1955        Visit Information        Provider Department      12/6/2018 12:45 PM Andres Flor MD [facility] [place] [facility]        Today's Diagnoses     Degeneration of lumbar or lumbosacral intervertebral disc        Failed back syndrome of lumbar spine           Follow-ups after your visit        Follow-up notes from your care team     Return in about 6 months (around 6/6/2019).      Your next 10 appointments already scheduled     Ehnry 10, 2019  8:15 AM CST   Return Visit with Peter Turner MD   [facility] ([facility])    [address]  [facility] [address] [phone]              Who to contact     If you have questions or need follow up information about today's clinic visit or your schedule please contact [facility] directly at [phone].  Normal or non-critical lab and imaging results will be communicated to you by MyChart, letter or phone within 4 business days after the clinic has received the results. If you do not hear from us within 7 days, please contact the clinic through Bazaarthart or phone. If you have a critical or abnormal lab result, we will notify you by phone as soon as possible.  Submit refill requests through Upside or call your pharmacy and they will forward the refill request to us. Please allow 3 business days for your refill to be completed.          Additional Information About Your Visit        Care EveryWhere ID     This is your Care EveryWhere ID. This could be used by other organizations to access your [place] medical records  [id number]        Your Vitals Were     Pulse BMI (Body Mass Index)                64 23.83 kg/m2           Blood Pressure from Last 3 Encounters:   12/06/18 122/80   11/01/18 140/82   09/27/18 130/72    Weight from Last 3  Encounters:   12/06/18 161 lb 6.4 oz (73.2 kg)   11/01/18 159 lb (72.1 kg)   09/27/18 165 lb (74.8 kg)              Today, you had the following     No orders found for display         Today's Medication Changes          These changes are accurate as of 12/6/18  1:21 PM.  If you have any questions, ask your nurse or doctor.               Stop taking these medicines if you haven't already. Please contact your care team if you have questions.     methadone 10 MG tablet   Commonly known as:  DOLOPHINE   Stopped by:  Andres Flor MD                Where to get your medicines      Some of these will need a paper prescription and others can be bought over the counter.  Ask your nurse if you have questions.     Bring a paper prescription for each of these medications     traMADol 50 MG tablet               Information about OPIOIDS     PRESCRIPTION OPIOIDS: WHAT YOU NEED TO KNOW   We gave you an opioid (narcotic) pain medicine. It is important to manage your pain, but opioids are not always the best choice. You should first try all the other options your care team gave you. Take this medicine for as short a time (and as few doses) as possible.    Some activities can increase your pain, such as bandage changes or therapy sessions. It may help to take your pain medicine 30 to 60 minutes before these activities. Reduce your stress by getting enough sleep, working on hobbies you enjoy and practicing relaxation or meditation. Talk to your care team about ways to manage your pain beyond prescription opioids.    These medicines have risks:    DO NOT drive when on new or higher doses of pain medicine. These medicines can affect your alertness and reaction times, and you could be arrested for driving under the influence (DUI). If you need to use opioids long-term, talk to your care team about driving.    DO NOT operate heavy machinery    DO NOT do any other dangerous activities while taking these medicines.    DO NOT drink any  alcohol while taking these medicines.     If the opioid prescribed includes acetaminophen, DO NOT take with any other medicines that contain acetaminophen. Read all labels carefully. Look for the word  acetaminophen  or  Tylenol.  Ask your pharmacist if you have questions or are unsure.    You can get addicted to pain medicines, especially if you have a history of addiction (chemical, alcohol or substance dependence). Talk to your care team about ways to reduce this risk.    All opioids tend to cause constipation. Drink plenty of water and eat foods that have a lot of fiber, such as fruits, vegetables, prune juice, apple juice and high-fiber cereal. Take a laxative (Miralax, milk of magnesia, Colace, Senna) if you don t move your bowels at least every other day. Other side effects include upset stomach, sleepiness, dizziness, throwing up, tolerance (needing more of the medicine to have the same effect), physical dependence and slowed breathing.    Store your pills in a secure place, locked if possible. We will not replace any lost or stolen medicine. If you don t finish your medicine, please throw away (dispose) as directed by your pharmacist. The [place] [employer] has more information about safe disposal: [email or web address]-[email or web address]         Primary Care Provider Office Phone # Fax #    Andres Flor [phone]       [address] [facility] [address]        Equal Access to Services     PALLAVI GROVE : Hadmanuelito granados Sopia, waaxda luqadaha, qaybta kaalmera ruvalcaba, alan duffy . So [facility] [phone].    ATENCIÓN: Si habla español, tiene a griffith disposición servicios gratuitos de asistencia lingüística. Emmanuel al [phone].    We comply with applicable federal civil rights laws and [place] laws. We do not discriminate on the basis of race, color, national origin, age, disability, sex, sexual  orientation, or gender identity.            Thank you!     Thank you for choosing [facility] AND [facility] [place]  for your care. Our goal is always to provide you with excellent care. Hearing back from our patients is one way we can continue to improve our services. Please take a few minutes to complete the written survey that you may receive in the mail after your visit with us. Thank you!             Your Updated Medication List - Protect others around you: Learn how to safely use, store and throw away your medicines at www.disposemymeds.org.          This list is accurate as of 12/6/18  1:21 PM.  Always use your most recent med list.                   Brand Name Dispense Instructions for use Diagnosis    lisinopril-hydrochlorothiazide 10-12.5 MG tablet    PRINZIDE/ZESTORETIC    90 tablet    Take 1 tablet by mouth daily    Essential hypertension       medical cannabis (Patient's own supply.  Not a prescription)      1 Dose See Admin Instructions (This is NOT a prescription, and does not certify that the patient has a qualifying medical condition for medical cannabis.  The purpose of this order is  to document that the patient reports taking medical cannabis.)        sertraline 50 MG tablet    ZOLOFT    90 tablet    Take 1 tablet (50 mg) by mouth daily    Depression, major, recurrent, mild (H)       traMADol 50 MG tablet    ULTRAM    60 tablet    Take 1 tablet (50 mg) by mouth every 6 hours as needed for severe pain    Degeneration of lumbar or lumbosacral intervertebral disc, Failed back syndrome of lumbar spine       triamcinolone 0.1 % external cream    KENALOG

## 2018-12-06 NOTE — NURSING NOTE
Patient presents to the clinic for a medication check.    Medication Reconciliation Completed.    Phong Jones LPN  12/6/2018 12:25 PM

## 2018-12-07 ASSESSMENT — ANXIETY QUESTIONNAIRES: GAD7 TOTAL SCORE: 0

## 2019-01-08 DIAGNOSIS — E29.1 HYPOGONADISM IN MALE: Primary | ICD-10-CM

## 2019-01-10 ENCOUNTER — OFFICE VISIT (OUTPATIENT)
Dept: UROLOGY | Facility: OTHER | Age: 64
End: 2019-01-10
Attending: UROLOGY
Payer: COMMERCIAL

## 2019-01-10 VITALS
DIASTOLIC BLOOD PRESSURE: 70 MMHG | SYSTOLIC BLOOD PRESSURE: 138 MMHG | RESPIRATION RATE: 14 BRPM | BODY MASS INDEX: 23.78 KG/M2 | WEIGHT: 161 LBS

## 2019-01-10 DIAGNOSIS — E29.1 HYPOGONADISM IN MALE: Primary | ICD-10-CM

## 2019-01-10 LAB — HCT VFR BLD AUTO: 52.2 % (ref 40–53)

## 2019-01-10 PROCEDURE — 85014 HEMATOCRIT: CPT | Performed by: UROLOGY

## 2019-01-10 PROCEDURE — 25000128 H RX IP 250 OP 636: Performed by: UROLOGY

## 2019-01-10 PROCEDURE — 96372 THER/PROPH/DIAG INJ SC/IM: CPT

## 2019-01-10 PROCEDURE — 99213 OFFICE O/P EST LOW 20 MIN: CPT | Performed by: UROLOGY

## 2019-01-10 PROCEDURE — 36415 COLL VENOUS BLD VENIPUNCTURE: CPT | Performed by: UROLOGY

## 2019-01-10 RX ADMIN — TESTOSTERONE UNDECANOATE 750 MG: 250 INJECTION INTRAMUSCULAR at 08:30

## 2019-01-10 ASSESSMENT — PAIN SCALES - GENERAL: PAINLEVEL: MILD PAIN (3)

## 2019-01-10 NOTE — NURSING NOTE
"Pt presents to clinic for his 10 week Aveed injection    Review of Systems:    Weight loss:    No     Recent fever/chills:  No   Night sweats:   No  Current skin rash:  No   Recent hair loss:  No  Heat intolerance:  No   Cold intolerance:  No  Chest pain:   No   Palpitations:   No  Shortness of breath:  No   Wheezing:   No  Constipation:    No   Diarrhea:   No   Nausea:   No   Vomiting:   No   Kidney/side pain:  No   Back pain:   Yes  Frequent headaches:  No   Dizziness:     No  Leg swelling:   No   Calf pain:    Yes    Patient given \"Aveed Treatment: A Patient Guide\" form.  Injection given and patient in clinic for 30 minutes after injection for monitoring.  Patient tolerated injection with no problems.  Batsheva Sheriff LPN.........1/10/2019...8:27 AM      "

## 2019-01-10 NOTE — PROGRESS NOTES
"Type of Visit  EST    Chief Complaint  Hypogonadism    HPI  Mr. Freedman is a 63 y.o. male who follows up with symptomatic hypogonadism.  Primary symptoms include low energy and poor sleep.  He has been on testosterone replacement for over 5 years.  He previously failed patches and short acting injections.  He is doing well with Aveed.  He has recently started medical cannabis with good results.  This has allowed him to discontinue methadone.  He recently was identified as having borderline elevated hematocrit.  Since last visit I recommended he donate blood to improve this.  He donated blood recently and it went well.  He has no concerns today.      Review of Systems  I reviewed the ROS with the patient today.    Nursing Notes:   Batsheva Sheriff LPN  1/10/2019  8:45 AM  Signed  Pt presents to clinic for his 10 week Aveed injection    Review of Systems:    Weight loss:    No     Recent fever/chills:  No   Night sweats:   No  Current skin rash:  No   Recent hair loss:  No  Heat intolerance:  No   Cold intolerance:  No  Chest pain:   No   Palpitations:   No  Shortness of breath:  No   Wheezing:   No  Constipation:    No   Diarrhea:   No   Nausea:   No   Vomiting:   No   Kidney/side pain:  No   Back pain:   Yes  Frequent headaches:  No   Dizziness:     No  Leg swelling:   No   Calf pain:    Yes    Patient given \"Aveed Treatment: A Patient Guide\" form.  Injection given and patient in clinic for 30 minutes after injection for monitoring.  Patient tolerated injection with no problems.  Batsheva Sheriff LPN.........1/10/2019...8:27 AM      Physical Exam  /70 (BP Location: Left arm, Patient Position: Sitting, Cuff Size: Adult Regular)   Resp 14   Wt 73 kg (161 lb)   BMI 23.78 kg/m    Constitutional: No acute distress.  Alert and cooperative   Head: NCAT  Eyes: Conjunctivae normal  Cardiovascular: Regular rate.  Pulmonary/Chest: Respirations are even and non-labored bilaterally, no audible wheezing  Abdominal: " Soft. No distension, tenderness, masses or guarding.   Extremities: GILDARDO x 4, Warm. No clubbing.  No cyanosis.    Skin: Pink, warm and dry.  No visible rashes noted.  Back:  No left CVA tenderness.  No right CVA tenderness.  Genitourinary:  Nonpalpable bladder    Labs  Results for TYRON JONES (MRN [id number]) as of 1/10/2019 09:52   1/10/2019 09:00   Hematocrit 52.2     Results for TEJINDER JONES (MRN [id number]) as of 11/1/2018 09:04   3/22/2018 08:01 10/23/2018 09:53   Testosterone Total 398 367   Hematocrit 53.2 (H) 56.1 (H)     Results for TEJINDER JONES (MRN [id number]) as of 9/22/2016 09:15   3/7/2016 09:16   TESTOSTERONE,TOTAL 104.4 (L)     Results for TEJINDER JONES (MRN [id number]) as of 9/7/2017 09:37   9/7/2016 08:55 1/26/2017 08:33 8/2/2017 09:11   PSA TOTAL 0.739 0.867 0.875     Testosterone Injection  Today the patient received an injection of testosterone undecanoate 750mg.  This was given in the gluteus.  The results of this injection: None  Patient was monitored for 30 minutes following the injection.    Assessment  Hypogonadism- continue Aveed q10 weeks  We are getting labs every third cycle.  hematocrit normal    Plan  Aveed 750mg IM every 10 weeks

## 2019-03-20 DIAGNOSIS — E29.1 HYPOGONADISM IN MALE: Primary | ICD-10-CM

## 2019-03-21 ENCOUNTER — OFFICE VISIT (OUTPATIENT)
Dept: UROLOGY | Facility: OTHER | Age: 64
End: 2019-03-21
Attending: UROLOGY
Payer: COMMERCIAL

## 2019-03-21 VITALS
SYSTOLIC BLOOD PRESSURE: 138 MMHG | RESPIRATION RATE: 14 BRPM | BODY MASS INDEX: 24.07 KG/M2 | HEART RATE: 64 BPM | WEIGHT: 163 LBS | DIASTOLIC BLOOD PRESSURE: 72 MMHG

## 2019-03-21 DIAGNOSIS — E29.1 HYPOGONADISM IN MALE: Primary | ICD-10-CM

## 2019-03-21 DIAGNOSIS — R39.12 WEAK URINARY STREAM: ICD-10-CM

## 2019-03-21 PROCEDURE — 96372 THER/PROPH/DIAG INJ SC/IM: CPT

## 2019-03-21 PROCEDURE — 25000128 H RX IP 250 OP 636: Performed by: UROLOGY

## 2019-03-21 PROCEDURE — 99213 OFFICE O/P EST LOW 20 MIN: CPT | Performed by: UROLOGY

## 2019-03-21 RX ADMIN — TESTOSTERONE UNDECANOATE 750 MG: 250 INJECTION INTRAMUSCULAR at 08:25

## 2019-03-21 ASSESSMENT — PAIN SCALES - GENERAL: PAINLEVEL: MODERATE PAIN (4)

## 2019-03-21 NOTE — NURSING NOTE
"Pt presents to clinic for his Aveed injection    Review of Systems:    Weight loss:    No     Recent fever/chills:  No   Night sweats:   No  Current skin rash:  No   Recent hair loss:  No  Heat intolerance:  No   Cold intolerance:  No  Chest pain:   No   Palpitations:   No  Shortness of breath:  No   Wheezing:   No  Constipation:    No   Diarrhea:   No   Nausea:   Yes   Vomiting:   No   Kidney/side pain:  No   Back pain:   Yes  Frequent headaches:  No   Dizziness:     No  Leg swelling:   No   Calf pain:    Yes  Patient given \"Aveed Treatment: A Patient Guide\" form.  Injection given and patient in clinic for 30 minutes after injection for monitoring.  Patient tolerated injection with no problems.  Batsheva Sheriff LPN .........3/21/2019...8:16 AM      "

## 2019-03-21 NOTE — PROGRESS NOTES
"Type of Visit  EST    Chief Complaint  Hypogonadism    HPI  Mr. Freedman is a 63 y.o. male who follows up with symptomatic hypogonadism.  Primary symptoms include low energy and poor sleep.  He has been on testosterone replacement for over 5 years.  He previously failed patches and short acting injections.    He continues to do well with AVEED.  Recent labs did reveal elevated hematocrit and he donated blood with a follow-up hematocrit that was normal.  He continues to report stable symptoms.  He denies side effects such as acne.      Review of Systems  I reviewed the ROS with the patient today.    Nursing Notes:   Batsheva Sheriff LPN  3/21/2019  8:30 AM  Signed  Pt presents to clinic for his Aveed injection    Review of Systems:    Weight loss:    No     Recent fever/chills:  No   Night sweats:   No  Current skin rash:  No   Recent hair loss:  No  Heat intolerance:  No   Cold intolerance:  No  Chest pain:   No   Palpitations:   No  Shortness of breath:  No   Wheezing:   No  Constipation:    No   Diarrhea:   No   Nausea:   Yes   Vomiting:   No   Kidney/side pain:  No   Back pain:   Yes  Frequent headaches:  No   Dizziness:     No  Leg swelling:   No   Calf pain:    Yes  Patient given \"Aveed Treatment: A Patient Guide\" form.  Injection given and patient in clinic for 30 minutes after injection for monitoring.  Patient tolerated injection with no problems.  Batsheva Sheriff LPN .........3/21/2019...8:16 AM         Physical Exam  /72 (BP Location: Left arm, Patient Position: Sitting, Cuff Size: Adult Regular)   Pulse 64   Resp 14   Wt 73.9 kg (163 lb)   BMI 24.07 kg/m    Constitutional: No acute distress.  Alert and cooperative   Head: NCAT  Eyes: Conjunctivae normal  Cardiovascular: Regular rate.  Pulmonary/Chest: Respirations are even and non-labored bilaterally, no audible wheezing  Abdominal: Soft. No distension, tenderness, masses or guarding.   Extremities: GILDARDO x 4, Warm. No clubbing.  No cyanosis.  "   Skin: Pink, warm and dry.  No visible rashes noted.  Back:  No left CVA tenderness.  No right CVA tenderness.  Genitourinary:  Nonpalpable bladder    Labs  Results for TYRON JONES (MRN [id number]) as of 1/10/2019 09:52   1/10/2019 09:00   Hematocrit 52.2     Results for TEJINDER JONES (MRN [id number]) as of 11/1/2018 09:04   3/22/2018 08:01 10/23/2018 09:53   Testosterone Total 398 367   Hematocrit 53.2 (H) 56.1 (H)     Results for TEJINDER JONES (MRN [id number]) as of 9/22/2016 09:15   3/7/2016 09:16   TESTOSTERONE,TOTAL 104.4 (L)     Results for TEJINDER JONES (MRN [id number]) as of 9/7/2017 09:37   9/7/2016 08:55 1/26/2017 08:33 8/2/2017 09:11   PSA TOTAL 0.739 0.867 0.875     Testosterone Injection  Today the patient received an injection of testosterone undecanoate 750mg.  This was given in the gluteus.  The results of this injection: None  Patient was monitored for 30 minutes following the injection.    Assessment  Hypogonadism- continue Aveed q10 weeks  We are getting labs every third cycle.  hematocrit normal    Plan  Labs in 8 weeks   Aveed 750mg IM every 10 weeks

## 2019-05-08 ENCOUNTER — TELEPHONE (OUTPATIENT)
Dept: FAMILY MEDICINE | Facility: OTHER | Age: 64
End: 2019-05-08

## 2019-05-08 NOTE — LETTER
May 8, 2019      Km Freedman  [address]  [place] [address]        To Whom It May Concern,     I have been Mr. Freedman's primary physician for over 10 years now.  He has a significant back injury that limits his ability to sit for more than 20-30 minutes at a time.  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      Sincerely,        Andres Flor MD

## 2019-05-08 NOTE — TELEPHONE ENCOUNTER
Patient EC states is wanting letter to excuse for jury duty, due to medical condition, states is due to leg pain, chronic pain, needs to take meds throughout the day, and does not feel could sit there all day and if needing to go to trial would be very difficult to sit there.  Taylor Beck LPN .............5/8/2019     8:35 AM

## 2019-05-16 ENCOUNTER — OFFICE VISIT (OUTPATIENT)
Dept: FAMILY MEDICINE | Facility: OTHER | Age: 64
End: 2019-05-16
Attending: FAMILY MEDICINE
Payer: COMMERCIAL

## 2019-05-16 VITALS
TEMPERATURE: 98.7 F | HEART RATE: 83 BPM | BODY MASS INDEX: 24.05 KG/M2 | SYSTOLIC BLOOD PRESSURE: 134 MMHG | RESPIRATION RATE: 14 BRPM | DIASTOLIC BLOOD PRESSURE: 86 MMHG | WEIGHT: 162.38 LBS | HEIGHT: 69 IN | OXYGEN SATURATION: 97 %

## 2019-05-16 DIAGNOSIS — R39.12 WEAK URINARY STREAM: ICD-10-CM

## 2019-05-16 DIAGNOSIS — E29.1 HYPOGONADISM IN MALE: ICD-10-CM

## 2019-05-16 DIAGNOSIS — M96.1 FAILED BACK SYNDROME OF LUMBAR SPINE: ICD-10-CM

## 2019-05-16 DIAGNOSIS — R79.89 ABNORMAL TSH: Primary | ICD-10-CM

## 2019-05-16 DIAGNOSIS — E07.9 DISORDER OF THYROID: ICD-10-CM

## 2019-05-16 DIAGNOSIS — M51.379 DEGENERATION OF LUMBAR OR LUMBOSACRAL INTERVERTEBRAL DISC: ICD-10-CM

## 2019-05-16 LAB
HCT VFR BLD AUTO: 54.8 % (ref 40–53)
PSA SERPL-MCNC: 0.89 NG/ML
TESTOST SERPL-MCNC: 338 NG/DL (ref 175–781)
TSH SERPL DL<=0.05 MIU/L-ACNC: 5.84 IU/ML (ref 0.34–5.6)

## 2019-05-16 PROCEDURE — 85014 HEMATOCRIT: CPT | Performed by: UROLOGY

## 2019-05-16 PROCEDURE — 84403 ASSAY OF TOTAL TESTOSTERONE: CPT | Performed by: UROLOGY

## 2019-05-16 PROCEDURE — 36415 COLL VENOUS BLD VENIPUNCTURE: CPT | Performed by: UROLOGY

## 2019-05-16 PROCEDURE — 84153 ASSAY OF PSA TOTAL: CPT | Performed by: UROLOGY

## 2019-05-16 PROCEDURE — 84443 ASSAY THYROID STIM HORMONE: CPT | Mod: ZL | Performed by: FAMILY MEDICINE

## 2019-05-16 PROCEDURE — 99214 OFFICE O/P EST MOD 30 MIN: CPT | Performed by: FAMILY MEDICINE

## 2019-05-16 PROCEDURE — 36415 COLL VENOUS BLD VENIPUNCTURE: CPT | Mod: ZL | Performed by: FAMILY MEDICINE

## 2019-05-16 RX ORDER — TRAMADOL HYDROCHLORIDE 50 MG/1
50 TABLET ORAL EVERY 6 HOURS PRN
Qty: 120 TABLET | Refills: 5 | Status: SHIPPED | OUTPATIENT
Start: 2019-05-16 | End: 2019-07-29

## 2019-05-16 RX ORDER — TRAMADOL HYDROCHLORIDE 50 MG/1
50 TABLET ORAL EVERY 6 HOURS PRN
Qty: 60 TABLET | Refills: 5 | Status: SHIPPED | OUTPATIENT
Start: 2019-05-16 | End: 2019-05-16

## 2019-05-16 ASSESSMENT — ENCOUNTER SYMPTOMS
CONSTIPATION: 0
BACK PAIN: 0
SHORTNESS OF BREATH: 0
MYALGIAS: 1
FEVER: 0
FATIGUE: 0
DIARRHEA: 0
SLEEP DISTURBANCE: 1

## 2019-05-16 ASSESSMENT — MIFFLIN-ST. JEOR: SCORE: 1511.91

## 2019-05-16 ASSESSMENT — PAIN SCALES - GENERAL: PAINLEVEL: SEVERE PAIN (6)

## 2019-05-16 ASSESSMENT — PATIENT HEALTH QUESTIONNAIRE - PHQ9: SUM OF ALL RESPONSES TO PHQ QUESTIONS 1-9: 0

## 2019-05-16 NOTE — PROGRESS NOTES
SUBJECTIVE:   Km Freedman is a 63 year old male who presents to clinic today for the following health issues:    HPI  Follow up on pain meds.  Getting significant lower extremity pain now.  Is on medical THC and finds this to be only mildly helpful for the calves, but does help with back and hip pains.  Mostly helps with anxiety and stress.  Over the last week has had more aching in both calves.  OTC alleve not helpful at all.  Has had a work up for this including an inconclusive muscle biopsy.  He has had a mildly elevated TSH in the past.      Patient Active Problem List    Diagnosis Date Noted     Arthropathy 02/13/2018     Priority: Medium     Overview:   Degenerative facet arthritis in the low back       Chondromalacia patellae 02/13/2018     Priority: Medium     Overview:   Degenerative arthritis, chondromalacia patella left knee       Depression, major, recurrent, mild (H) 02/13/2018     Priority: Medium     Degeneration of lumbar or lumbosacral intervertebral disc 02/13/2018     Priority: Medium     Hypercholesterolemia 02/13/2018     Priority: Medium     Lateral epicondylitis of right elbow 02/13/2018     Priority: Medium     Leg cramps 02/13/2018     Priority: Medium     Overview:   Chronic       Neck pain, chronic 02/13/2018     Priority: Medium     Overview:   more prominent on the right       Restless leg syndrome 02/13/2018     Priority: Medium     Muscle pain 08/31/2017     Priority: Medium     Elevated CPK 08/24/2017     Priority: Medium     Bilateral calf pain 08/11/2017     Priority: Medium     Hypogonadism in male 04/25/2016     Priority: Medium     Failed back syndrome of lumbar spine 03/07/2016     Priority: Medium     Medication management 12/07/2015     Priority: Medium     Essential hypertension 10/30/2014     Priority: Medium     Pain medication agreement 12/16/2013     Priority: Medium     Overview:   Methadone 10 mg #30.   Signed 05/17/2013, UPDATED 1/2014, updated 3/7/2016        Enthesopathy of hip region 05/02/2011     Priority: Medium     Esophageal reflux 12/30/2009     Priority: Medium     Past Surgical History:   Procedure Laterality Date     FUSION LUMBAR ANTERIOR ONE LEVEL      1999,L4-L5     FUSION LUMBAR ANTERIOR ONE LEVEL      01/24/01,L5-S1 discectomy with anterior interbody fusion L4-5     OTHER SURGICAL HISTORY      1998,[id number],LUMBAR DISKECTOMY,L5-S1     OTHER SURGICAL HISTORY      2000,[id number],OTHER,left medial branch     OTHER SURGICAL HISTORY      2001,[id number],OTHER,Has had 2 rhizomoties     Social History     Tobacco Use     Smoking status: Never Smoker     Smokeless tobacco: Never Used   Substance Use Topics     Alcohol use: No     Current Outpatient Medications   Medication Sig Dispense Refill     lisinopril-hydrochlorothiazide (PRINZIDE/ZESTORETIC) 10-12.5 MG per tablet Take 1 tablet by mouth daily 90 tablet 3     medical cannabis (Patient's own supply.  Not a prescription) 1 Dose See Admin Instructions (This is NOT a prescription, and does not certify that the patient has a qualifying medical condition for medical cannabis.  The purpose of this order is  to document that the patient reports taking medical cannabis.)       sertraline (ZOLOFT) 50 MG tablet Take 1 tablet (50 mg) by mouth daily 90 tablet 3     traMADol (ULTRAM) 50 MG tablet Take 1 tablet (50 mg) by mouth every 6 hours as needed for severe pain 60 tablet 5     triamcinolone (KENALOG) 0.1 % cream        No Known Allergies    Review of Systems   Constitutional: Negative for fatigue and fever.   Respiratory: Negative for shortness of breath.    Cardiovascular: Negative for chest pain.   Gastrointestinal: Negative for constipation and diarrhea.   Musculoskeletal: Positive for myalgias. Negative for back pain.   Psychiatric/Behavioral: Positive for sleep disturbance.        OBJECTIVE:     /86 (BP Location: Right arm, Patient Position: Sitting, Cuff Size: Adult Regular)   Pulse 83   Temp 98.7  F (37.1  C)  "(Tympanic)   Resp 14   Ht 1.753 m (5' 9\")   Wt 73.7 kg (162 lb 6 oz)   SpO2 97%   BMI 23.98 kg/m    Body mass index is 23.98 kg/m .  Physical Exam   Constitutional: He is oriented to person, place, and time. He appears well-developed.   Neck: No thyromegaly present.   Cardiovascular: Normal rate, regular rhythm and normal heart sounds.   Pulmonary/Chest: Effort normal and breath sounds normal. No stridor. No respiratory distress. He has no wheezes.   Neurological: He is alert and oriented to person, place, and time.   Skin: Skin is warm and dry. He is not diaphoretic.           ASSESSMENT/PLAN:         (R79.89) Abnormal TSH  (primary encounter diagnosis)  Comment: it is possible this is causing some of the muscle pains.  Plan: TSH         Lab pending    (M51.37) Degeneration of lumbar or lumbosacral intervertebral disc  Comment: stable  Plan: traMADol (ULTRAM) 50 MG tablet, DISCONTINUED:         traMADol (ULTRAM) 50 MG tablet        Will increase to up to 120 per month    (M96.1) Failed back syndrome of lumbar spine  Comment:    Plan: traMADol (ULTRAM) 50 MG tablet, DISCONTINUED:         traMADol (ULTRAM) 50 MG tablet             (E07.9) Disorder of thyroid   Comment:    Plan: TSH                 Andres Flor MD  [facility]    "

## 2019-05-16 NOTE — NURSING NOTE
Patient presents to clinic today for medication management and refills.     No LMP for male patient.  Medication Reconciliation: complete    Sharon Hahn LPN  5/16/2019 12:11 PM

## 2019-05-16 NOTE — LETTER
May 16, 2019      mK Freedman  [address]  [place] [address]        Dear Km,     This is still very slightly elevated.  If you want to try a low dose of thyroid medication, let me know and I will fax it in.  It might help a little with the pain, but is still an optional treatment given how close this is to normal.    Results for orders placed or performed in visit on 05/16/19   TSH   Result Value Ref Range    Thyrotropin 5.84 (H) 0.34 - 5.60 IU/mL           Sincerely,        Andres Flor MD

## 2019-05-30 ENCOUNTER — OFFICE VISIT (OUTPATIENT)
Dept: UROLOGY | Facility: OTHER | Age: 64
End: 2019-05-30
Attending: UROLOGY
Payer: COMMERCIAL

## 2019-05-30 ENCOUNTER — TELEPHONE (OUTPATIENT)
Dept: FAMILY MEDICINE | Facility: OTHER | Age: 64
End: 2019-05-30

## 2019-05-30 VITALS
SYSTOLIC BLOOD PRESSURE: 138 MMHG | WEIGHT: 161.4 LBS | RESPIRATION RATE: 14 BRPM | BODY MASS INDEX: 23.83 KG/M2 | HEART RATE: 82 BPM | DIASTOLIC BLOOD PRESSURE: 84 MMHG

## 2019-05-30 DIAGNOSIS — E29.1 HYPOGONADISM IN MALE: Primary | ICD-10-CM

## 2019-05-30 DIAGNOSIS — E03.8 OTHER SPECIFIED HYPOTHYROIDISM: Primary | ICD-10-CM

## 2019-05-30 PROCEDURE — 25000128 H RX IP 250 OP 636: Performed by: UROLOGY

## 2019-05-30 PROCEDURE — 96372 THER/PROPH/DIAG INJ SC/IM: CPT

## 2019-05-30 PROCEDURE — 99213 OFFICE O/P EST LOW 20 MIN: CPT | Performed by: UROLOGY

## 2019-05-30 RX ORDER — LEVOTHYROXINE SODIUM 50 UG/1
50 TABLET ORAL DAILY
Qty: 90 TABLET | Refills: 3 | Status: SHIPPED | OUTPATIENT
Start: 2019-05-30 | End: 2020-06-17

## 2019-05-30 RX ADMIN — TESTOSTERONE UNDECANOATE 750 MG: 250 INJECTION INTRAMUSCULAR at 08:12

## 2019-05-30 ASSESSMENT — PAIN SCALES - GENERAL: PAINLEVEL: MODERATE PAIN (4)

## 2019-05-30 NOTE — PROGRESS NOTES
"Type of Visit  EST    Chief Complaint  Hypogonadism    HPI  Mr. Freedman is a 63 y.o. male who follows up with symptomatic hypogonadism.  Primary symptoms include low energy and poor sleep.  He has been on testosterone replacement for over 5 years.  He previously failed patches and short acting injections.    He continues to do well with AVEED.  Recent labs did reveal elevated hematocrit.  This is something he has dealt with in the past intermittently.  He has donated blood in the past to correct value.  He has not donated recently.  He denies shortness of breath, chest pain, acne or mood lability.      Review of Systems  I reviewed the ROS with the patient today.    Nursing Notes:   Batsheva Sheriff LPN  5/30/2019  8:17 AM  Signed  Pt presents to clinic for his 10 week Aveed injection    Review of Systems:    Weight loss:    No     Recent fever/chills:  No   Night sweats:   No  Current skin rash:  No   Recent hair loss:  No  Heat intolerance:  No   Cold intolerance:  No  Chest pain:   No   Palpitations:   No  Shortness of breath:  No   Wheezing:   No  Constipation:    No   Diarrhea:   No   Nausea:   No   Vomiting:   No   Kidney/side pain:  No   Back pain:   Yes  Frequent headaches:  No   Dizziness:     No  Leg swelling:   No   Calf pain:    Yes    Patient given \"Aveed Treatment: A Patient Guide\" form.  Injection given and patient in clinic for 30 minutes after injection for monitoring.  Patient tolerated injection with no problems.  Batsheva Sheriff LPN .........5/30/2019...8:03 AM         Physical Exam  /84 (BP Location: Left arm, Patient Position: Sitting, Cuff Size: Adult Regular)   Pulse 82   Resp 14   Wt 73.2 kg (161 lb 6.4 oz)   BMI 23.83 kg/m    Constitutional: No acute distress.  Alert and cooperative   Head: NCAT  Eyes: Conjunctivae normal  Cardiovascular: Regular rate.  Pulmonary/Chest: Respirations are even and non-labored bilaterally, no audible wheezing  Abdominal: Soft. No distension, " tenderness, masses or guarding.   Extremities: GILDARDO x 4, Warm. No clubbing.  No cyanosis.    Skin: Pink, warm and dry.  No visible rashes noted.  Back:  No left CVA tenderness.  No right CVA tenderness.  Genitourinary:  Nonpalpable bladder    Labs  Results for TYRON JONES (MRN [id number]) as of 5/30/2019 08:17   5/16/2019 08:48   Prostate Specific Antigen 0.885   Testosterone Total 338   Hematocrit 54.8 (H)     Results for TYRON JONES (MRN [id number]) as of 1/10/2019 09:52   1/10/2019 09:00   Hematocrit 52.2     Results for TEJINDER JONES (MRN [id number]) as of 11/1/2018 09:04   3/22/2018 08:01 10/23/2018 09:53   Testosterone Total 398 367   Hematocrit 53.2 (H) 56.1 (H)     Results for TEJINDER JONES (MRN [id number]) as of 9/22/2016 09:15   3/7/2016 09:16   TESTOSTERONE,TOTAL 104.4 (L)     Results for TEJINDER JONES (MRN [id number]) as of 9/7/2017 09:37   9/7/2016 08:55 1/26/2017 08:33 8/2/2017 09:11   PSA TOTAL 0.739 0.867 0.875     Testosterone Injection  Today the patient received an injection of testosterone undecanoate 750mg.  This was given in the gluteus.  The results of this injection: None  Patient was monitored for 30 minutes following the injection.    Assessment  Hypogonadism- continue Aveed q10 weeks  We are getting labs every third cycle.  hematocrit elevated -he plans to donate blood this next week.  I printed out blood donation locations in the region for the next week and handed it to him.    He asked me today about a mildly elevated thyrotropin.  I encouraged him to continue follow-up with his PCP and to consider thyroid replacement at the direction of his PCP    Plan  Labs in 28 weeks   Aveed 750mg IM every 10 weeks

## 2019-05-30 NOTE — NURSING NOTE
"Pt presents to clinic for his 10 week Aveed injection    Review of Systems:    Weight loss:    No     Recent fever/chills:  No   Night sweats:   No  Current skin rash:  No   Recent hair loss:  No  Heat intolerance:  No   Cold intolerance:  No  Chest pain:   No   Palpitations:   No  Shortness of breath:  No   Wheezing:   No  Constipation:    No   Diarrhea:   No   Nausea:   No   Vomiting:   No   Kidney/side pain:  No   Back pain:   Yes  Frequent headaches:  No   Dizziness:     No  Leg swelling:   No   Calf pain:    Yes    Patient given \"Aveed Treatment: A Patient Guide\" form.  Injection given and patient in clinic for 30 minutes after injection for monitoring.  Patient tolerated injection with no problems.  Batsheva Sheriff LPN .........5/30/2019...8:03 AM      "

## 2019-05-30 NOTE — TELEPHONE ENCOUNTER
Patient stopped by today and wants that medication called into [facility] for his Thyroid.    Any questions please call patient.  Thank You Lea Faulkner on 5/30/2019 at 8:40 AM

## 2019-07-18 ENCOUNTER — TRANSFERRED RECORDS (OUTPATIENT)
Dept: HEALTH INFORMATION MANAGEMENT | Facility: OTHER | Age: 64
End: 2019-07-18

## 2019-07-29 DIAGNOSIS — M51.379 DEGENERATION OF LUMBAR OR LUMBOSACRAL INTERVERTEBRAL DISC: ICD-10-CM

## 2019-07-29 DIAGNOSIS — M96.1 FAILED BACK SYNDROME OF LUMBAR SPINE: ICD-10-CM

## 2019-07-29 NOTE — TELEPHONE ENCOUNTER
Pharmacy calling regarding pt's tramadol refill, states that Rxs are only good for 30 days now and pt will need a new Rx.

## 2019-07-29 NOTE — TELEPHONE ENCOUNTER
Routing refill request to provider for review/approval because:  Drug not on the [facility] refill protocol     Per pharmacy due to new law Rx voided after 30 days.  Patient needs new RX    LOV; 5/16/19  Ksenia Salamanca RN on 7/29/2019 at 2:06 PM

## 2019-07-30 RX ORDER — TRAMADOL HYDROCHLORIDE 50 MG/1
50 TABLET ORAL EVERY 6 HOURS PRN
Qty: 120 TABLET | Refills: 5 | Status: SHIPPED | OUTPATIENT
Start: 2019-07-30 | End: 2020-01-14

## 2019-08-08 ENCOUNTER — OFFICE VISIT (OUTPATIENT)
Dept: UROLOGY | Facility: OTHER | Age: 64
End: 2019-08-08
Attending: UROLOGY
Payer: COMMERCIAL

## 2019-08-08 VITALS
SYSTOLIC BLOOD PRESSURE: 136 MMHG | DIASTOLIC BLOOD PRESSURE: 82 MMHG | WEIGHT: 156 LBS | RESPIRATION RATE: 14 BRPM | HEART RATE: 68 BPM | BODY MASS INDEX: 23.04 KG/M2

## 2019-08-08 DIAGNOSIS — E29.1 HYPOGONADISM IN MALE: Primary | ICD-10-CM

## 2019-08-08 PROCEDURE — 25000128 H RX IP 250 OP 636: Performed by: UROLOGY

## 2019-08-08 PROCEDURE — 96372 THER/PROPH/DIAG INJ SC/IM: CPT

## 2019-08-08 PROCEDURE — 99213 OFFICE O/P EST LOW 20 MIN: CPT | Performed by: UROLOGY

## 2019-08-08 RX ADMIN — TESTOSTERONE UNDECANOATE 750 MG: 250 INJECTION INTRAMUSCULAR at 08:35

## 2019-08-08 ASSESSMENT — PAIN SCALES - GENERAL: PAINLEVEL: MODERATE PAIN (4)

## 2019-08-08 NOTE — NURSING NOTE
"Pt presents to clinic for his 10 week Aveed injection    Review of Systems:    Weight loss:    No     Recent fever/chills:  No   Night sweats:   No  Current skin rash:  No   Recent hair loss:  No  Heat intolerance:  No   Cold intolerance:  No  Chest pain:   No   Palpitations:   No  Shortness of breath:  No   Wheezing:   No  Constipation:    No   Diarrhea:   No   Nausea:   No   Vomiting:   No   Kidney/side pain:  No   Back pain:   Yes  Frequent headaches:  No   Dizziness:     No  Leg swelling:   No   Calf pain:    Yes    Patient given \"Aveed Treatment: A Patient Guide\" form.  Injection given and patient in clinic for 30 minutes after injection for monitoring.  Patient tolerated injection with no problems.  Batsheva Sheriff LPN .........8/8/2019...8:27 AM    "

## 2019-08-08 NOTE — PROGRESS NOTES
"Type of Visit  EST    Chief Complaint  Hypogonadism    HPI  Mr. Freedman is a 63 y.o. male who follows up with symptomatic hypogonadism.  Primary symptoms include low energy and poor sleep.  He has been on testosterone replacement for over 5 years.  He previously failed patches and short acting injections.    He continues to do well with AVEED.  Recent labs did reveal elevated hematocrit.  Since last visit he has donated blood because of the elevated hematocrit.  He denies side effects such as chest pain, redness of breath, mood lability or acne      Review of Systems  I reviewed the ROS with the patient today.    Nursing Notes:   aBtsheva Sheriff LPN  8/8/2019  8:54 AM  Signed  Pt presents to clinic for his 10 week Aveed injection    Review of Systems:    Weight loss:    No     Recent fever/chills:  No   Night sweats:   No  Current skin rash:  No   Recent hair loss:  No  Heat intolerance:  No   Cold intolerance:  No  Chest pain:   No   Palpitations:   No  Shortness of breath:  No   Wheezing:   No  Constipation:    No   Diarrhea:   No   Nausea:   No   Vomiting:   No   Kidney/side pain:  No   Back pain:   Yes  Frequent headaches:  No   Dizziness:     No  Leg swelling:   No   Calf pain:    Yes    Patient given \"Aveed Treatment: A Patient Guide\" form.  Injection given and patient in clinic for 30 minutes after injection for monitoring.  Patient tolerated injection with no problems.  Batsheva Sheriff LPN .........8/8/2019...8:27 AM       Physical Exam  /82 (BP Location: Left arm, Patient Position: Sitting, Cuff Size: Adult Regular)   Pulse 68   Resp 14   Wt 70.8 kg (156 lb)   BMI 23.04 kg/m    Constitutional: No acute distress.  Alert and cooperative   Head: NCAT  Eyes: Conjunctivae normal  Cardiovascular: Regular rate.  Pulmonary/Chest: Respirations are even and non-labored bilaterally, no audible wheezing  Abdominal: Soft. No distension, tenderness, masses or guarding.   Extremities: GILDARDO x 4, Warm. No " clubbing.  No cyanosis.    Skin: Pink, warm and dry.  No visible rashes noted.  Back:  No left CVA tenderness.  No right CVA tenderness.  Genitourinary:  Nonpalpable bladder    Labs  Results for TYRON JONES (MRN [id number]) as of 5/30/2019 08:17   5/16/2019 08:48   Prostate Specific Antigen 0.885   Testosterone Total 338   Hematocrit 54.8 (H)     Results for TYRON JONES (MRN [id number]) as of 1/10/2019 09:52   1/10/2019 09:00   Hematocrit 52.2     Results for TEJINDER JONES (MRN [id number]) as of 11/1/2018 09:04   3/22/2018 08:01 10/23/2018 09:53   Testosterone Total 398 367   Hematocrit 53.2 (H) 56.1 (H)     Results for TEJINDER JONES (MRN [id number]) as of 9/22/2016 09:15   3/7/2016 09:16   TESTOSTERONE,TOTAL 104.4 (L)     Results for TEJINDER JONES (MRN [id number]) as of 9/7/2017 09:37   9/7/2016 08:55 1/26/2017 08:33 8/2/2017 09:11   PSA TOTAL 0.739 0.867 0.875     Testosterone Injection  Today the patient received an injection of testosterone undecanoate 750mg.  This was given in the gluteus.  The results of this injection: None  Patient was monitored for 30 minutes following the injection.    Assessment  Hypogonadism- continue Aveed q10 weeks  We are getting labs every third cycle.  hematocrit elevated -he donated blood a few weeks ago    Plan  Labs in 18 weeks   Aveed 750mg IM every 10 weeks

## 2019-08-12 DIAGNOSIS — F33.0 DEPRESSION, MAJOR, RECURRENT, MILD (H): ICD-10-CM

## 2019-08-12 DIAGNOSIS — I10 ESSENTIAL HYPERTENSION: ICD-10-CM

## 2019-08-13 ENCOUNTER — OFFICE VISIT (OUTPATIENT)
Dept: FAMILY MEDICINE | Facility: OTHER | Age: 64
End: 2019-08-13
Attending: FAMILY MEDICINE
Payer: COMMERCIAL

## 2019-08-13 VITALS
BODY MASS INDEX: 23.42 KG/M2 | WEIGHT: 158.6 LBS | DIASTOLIC BLOOD PRESSURE: 78 MMHG | SYSTOLIC BLOOD PRESSURE: 132 MMHG | RESPIRATION RATE: 16 BRPM | TEMPERATURE: 98.6 F | HEART RATE: 68 BPM | OXYGEN SATURATION: 98 %

## 2019-08-13 DIAGNOSIS — I10 ESSENTIAL HYPERTENSION: Primary | ICD-10-CM

## 2019-08-13 DIAGNOSIS — L98.9 SKIN LESION OF BACK: ICD-10-CM

## 2019-08-13 LAB
ANION GAP SERPL CALCULATED.3IONS-SCNC: 5 MMOL/L (ref 3–14)
BUN SERPL-MCNC: 16 MG/DL (ref 7–25)
CALCIUM SERPL-MCNC: 9.4 MG/DL (ref 8.6–10.3)
CHLORIDE SERPL-SCNC: 100 MMOL/L (ref 98–107)
CO2 SERPL-SCNC: 32 MMOL/L (ref 21–31)
CREAT SERPL-MCNC: 0.97 MG/DL (ref 0.7–1.3)
GFR SERPL CREATININE-BSD FRML MDRD: 77 ML/MIN/{1.73_M2}
GLUCOSE SERPL-MCNC: 90 MG/DL (ref 70–105)
POTASSIUM SERPL-SCNC: 4.1 MMOL/L (ref 3.5–5.1)
SODIUM SERPL-SCNC: 137 MMOL/L (ref 134–144)

## 2019-08-13 PROCEDURE — 99214 OFFICE O/P EST MOD 30 MIN: CPT | Performed by: FAMILY MEDICINE

## 2019-08-13 PROCEDURE — 36415 COLL VENOUS BLD VENIPUNCTURE: CPT | Mod: ZL | Performed by: FAMILY MEDICINE

## 2019-08-13 PROCEDURE — 80048 BASIC METABOLIC PNL TOTAL CA: CPT | Mod: ZL | Performed by: FAMILY MEDICINE

## 2019-08-13 RX ORDER — LISINOPRIL/HYDROCHLOROTHIAZIDE 10-12.5 MG
1 TABLET ORAL DAILY
Qty: 90 TABLET | Refills: 3 | Status: SHIPPED | OUTPATIENT
Start: 2019-08-13 | End: 2020-09-29

## 2019-08-13 ASSESSMENT — ANXIETY QUESTIONNAIRES
2. NOT BEING ABLE TO STOP OR CONTROL WORRYING: NOT AT ALL
1. FEELING NERVOUS, ANXIOUS, OR ON EDGE: NOT AT ALL
6. BECOMING EASILY ANNOYED OR IRRITABLE: NOT AT ALL
3. WORRYING TOO MUCH ABOUT DIFFERENT THINGS: NOT AT ALL
IF YOU CHECKED OFF ANY PROBLEMS ON THIS QUESTIONNAIRE, HOW DIFFICULT HAVE THESE PROBLEMS MADE IT FOR YOU TO DO YOUR WORK, TAKE CARE OF THINGS AT HOME, OR GET ALONG WITH OTHER PEOPLE: NOT DIFFICULT AT ALL
7. FEELING AFRAID AS IF SOMETHING AWFUL MIGHT HAPPEN: NOT AT ALL
GAD7 TOTAL SCORE: 0
5. BEING SO RESTLESS THAT IT IS HARD TO SIT STILL: NOT AT ALL

## 2019-08-13 ASSESSMENT — ENCOUNTER SYMPTOMS
SHORTNESS OF BREATH: 0
WOUND: 0
FATIGUE: 0
HEADACHES: 0
COLOR CHANGE: 0

## 2019-08-13 ASSESSMENT — PAIN SCALES - GENERAL: PAINLEVEL: MODERATE PAIN (4)

## 2019-08-13 ASSESSMENT — PATIENT HEALTH QUESTIONNAIRE - PHQ9: 5. POOR APPETITE OR OVEREATING: NOT AT ALL

## 2019-08-13 NOTE — LETTER
August 13, 2019      Km Freedman  [address] [place] [address]        Dear Km,     This is all great.    Results for orders placed or performed in visit on 08/13/19   Basic Metabolic Panel   Result Value Ref Range    Sodium 137 134 - 144 mmol/L    Potassium 4.1 3.5 - 5.1 mmol/L    Chloride 100 98 - 107 mmol/L    Carbon Dioxide 32 (H) 21 - 31 mmol/L    Anion Gap 5 3 - 14 mmol/L    Glucose 90 70 - 105 mg/dL    Urea Nitrogen 16 7 - 25 mg/dL    Creatinine 0.97 0.70 - 1.30 mg/dL    GFR Estimate 77 >60 mL/min/[1.73_m2]    GFR Estimate If Black >90 >60 mL/min/[1.73_m2]    Calcium 9.4 8.6 - 10.3 mg/dL           Sincerely,        Andres Flor MD

## 2019-08-13 NOTE — NURSING NOTE
"coming in for a medication check up and check a mole on his back    Chief Complaint   Patient presents with     Recheck Medication     and check mole on her back       Initial /78 (BP Location: Right arm, Patient Position: Sitting, Cuff Size: Adult Large)   Pulse 68   Temp 98.6  F (37  C) (Tympanic)   Resp 16   Wt 71.9 kg (158 lb 9.6 oz)   SpO2 98%   BMI 23.42 kg/m   Estimated body mass index is 23.42 kg/m  as calculated from the following:    Height as of 5/16/19: 1.753 m (5' 9\").    Weight as of this encounter: 71.9 kg (158 lb 9.6 oz).  Medication Reconciliation: complete    Kisha Valdovinos LPN  "

## 2019-08-13 NOTE — PROGRESS NOTES
SUBJECTIVE:   Km Freedman is a 64 year old male who presents to clinic today for the following health issues:    HPI  Mole on back and follow up on hypertension.  He does not check his blood pressure.  No chest pain or shortness of breath and no side effects from them at all.    Daughter has noted a mole on his back.  He is not sure how long it has been there.  No bleeding or pain at all.       Patient Active Problem List    Diagnosis Date Noted     Other specified hypothyroidism 05/30/2019     Priority: Medium     Arthropathy 02/13/2018     Priority: Medium     Overview:   Degenerative facet arthritis in the low back       Chondromalacia patellae 02/13/2018     Priority: Medium     Overview:   Degenerative arthritis, chondromalacia patella left knee       Depression, major, recurrent, mild (H) 02/13/2018     Priority: Medium     Degeneration of lumbar or lumbosacral intervertebral disc 02/13/2018     Priority: Medium     Hypercholesterolemia 02/13/2018     Priority: Medium     Lateral epicondylitis of right elbow 02/13/2018     Priority: Medium     Leg cramps 02/13/2018     Priority: Medium     Overview:   Chronic       Neck pain, chronic 02/13/2018     Priority: Medium     Overview:   more prominent on the right       Restless leg syndrome 02/13/2018     Priority: Medium     Muscle pain 08/31/2017     Priority: Medium     Elevated CPK 08/24/2017     Priority: Medium     Bilateral calf pain 08/11/2017     Priority: Medium     Hypogonadism in male 04/25/2016     Priority: Medium     Failed back syndrome of lumbar spine 03/07/2016     Priority: Medium     Medication management 12/07/2015     Priority: Medium     Essential hypertension 10/30/2014     Priority: Medium     Pain medication agreement 12/16/2013     Priority: Medium     Overview:   Methadone 10 mg #30.   Signed 05/17/2013, UPDATED 1/2014, updated 3/7/2016       Enthesopathy of hip region 05/02/2011     Priority: Medium     Esophageal reflux  12/30/2009     Priority: Medium     Past Surgical History:   Procedure Laterality Date     FUSION LUMBAR ANTERIOR ONE LEVEL  1999    interbody fusion L4-5     FUSION LUMBAR ANTERIOR ONE LEVEL  01/24/2001    L5-S1 discectomy with anterior interbody fusion L4-5     IR RHIZOTOMY  2001    Has had 2 rhizomoties     IR RHIZOTOMY Left 2000    medial branch     LAMINECT/DISCECTOMY, LUMBAR  1998    NEURO,LUMBAR DISKECTOMY, L5-S1     Social History     Tobacco Use     Smoking status: Never Smoker     Smokeless tobacco: Never Used   Substance Use Topics     Alcohol use: No     Current Outpatient Medications   Medication Sig Dispense Refill     levothyroxine (SYNTHROID/LEVOTHROID) 50 MCG tablet Take 1 tablet (50 mcg) by mouth daily 90 tablet 3     lisinopril-hydrochlorothiazide (PRINZIDE/ZESTORETIC) 10-12.5 MG per tablet Take 1 tablet by mouth daily 90 tablet 3     medical cannabis (Patient's own supply.  Not a prescription) 1 Dose See Admin Instructions (This is NOT a prescription, and does not certify that the patient has a qualifying medical condition for medical cannabis.  The purpose of this order is  to document that the patient reports taking medical cannabis.)       sertraline (ZOLOFT) 50 MG tablet Take 1 tablet (50 mg) by mouth daily 90 tablet 3     traMADol (ULTRAM) 50 MG tablet Take 1 tablet (50 mg) by mouth every 6 hours as needed for severe pain 120 tablet 5     triamcinolone (KENALOG) 0.1 % cream        No Known Allergies    Review of Systems   Constitutional: Negative for fatigue.   Respiratory: Negative for shortness of breath.    Cardiovascular: Negative for chest pain.   Skin: Negative for color change, rash and wound.   Neurological: Negative for headaches.        OBJECTIVE:     /78 (BP Location: Right arm, Patient Position: Sitting, Cuff Size: Adult Large)   Pulse 68   Temp 98.6  F (37  C) (Tympanic)   Resp 16   Wt 71.9 kg (158 lb 9.6 oz)   SpO2 98%   BMI 23.42 kg/m    Body mass index is 23.42  kg/m .  Physical Exam   Constitutional: He is oriented to person, place, and time. He appears well-developed. No distress.   Cardiovascular: Normal rate, regular rhythm and normal heart sounds. Exam reveals no friction rub.   No murmur heard.  Pulmonary/Chest: Effort normal and breath sounds normal. No stridor. No respiratory distress. He has no wheezes. He has no rales.   Abdominal: Soft. He exhibits no distension and no mass. There is no tenderness. There is no guarding.   Neurological: He is alert and oriented to person, place, and time.   Skin: He is not diaphoretic.   Left upper thorax with a dark, patch, irregular borders, about 7 x 6 mm or so.  variegated with dark brown spots.         Diagnostic Test Results:  pending    ASSESSMENT/PLAN:         (I10) Essential hypertension  (primary encounter diagnosis)  Comment: stable and at goal  Plan: lisinopril-hydrochlorothiazide         (PRINZIDE/ZESTORETIC) 10-12.5 MG tablet, Basic         Metabolic Panel        Refilled for 1 year.    (L98.9) Skin lesion of back  Comment: this has several worrisome features for a melanoma.  It is large, irregular edges and irregular coloration.  Will need an exicsion.  Plan: follow up for excision soon.      Andres Flor MD  [facility]

## 2019-08-14 RX ORDER — LISINOPRIL/HYDROCHLOROTHIAZIDE 10-12.5 MG
1 TABLET ORAL DAILY
Qty: 90 TABLET | Refills: 3 | OUTPATIENT
Start: 2019-08-14

## 2019-08-14 ASSESSMENT — ANXIETY QUESTIONNAIRES: GAD7 TOTAL SCORE: 0

## 2019-08-14 NOTE — TELEPHONE ENCOUNTER
Claude hernandez sent Rx request for the following:      Sertraline 50 mg tablet      Last Prescription Date:   9/27/19  Last Fill Qty/Refills:         90, R-3    Last Office Visit:              8/13/19   Future Office visit:           9/3/19    Prescription approved per [facility] Refill Protocol.  Andra Price RN............................ 8/14/2019 3:25 PM       lisinopril-hydrochlorothiazide 10-12.5 mg tablet    Medication refilled yesterday.  Request refused.

## 2019-09-03 ENCOUNTER — OFFICE VISIT (OUTPATIENT)
Dept: FAMILY MEDICINE | Facility: OTHER | Age: 64
End: 2019-09-03
Attending: FAMILY MEDICINE
Payer: COMMERCIAL

## 2019-09-03 VITALS
HEART RATE: 76 BPM | BODY MASS INDEX: 23.54 KG/M2 | SYSTOLIC BLOOD PRESSURE: 136 MMHG | DIASTOLIC BLOOD PRESSURE: 82 MMHG | WEIGHT: 159.4 LBS

## 2019-09-03 DIAGNOSIS — Z53.9 DIAGNOSIS NOT YET DEFINED: Primary | ICD-10-CM

## 2019-09-03 PROCEDURE — 88305 TISSUE EXAM BY PATHOLOGIST: CPT

## 2019-09-03 PROCEDURE — 11401 EXC TR-EXT B9+MARG 0.6-1 CM: CPT | Performed by: FAMILY MEDICINE

## 2019-09-03 ASSESSMENT — PAIN SCALES - GENERAL: PAINLEVEL: NO PAIN (0)

## 2019-09-03 NOTE — PROGRESS NOTES
path  Subjective: Km Freedman a 64 year old male who presents today for lesion removal. The lesion(s) is/are located on the back, number 1 and measures 0.8cm He The patient reports the lesion is dark and enlarging, worrisome for a melanoma and denies other significant symptoms on ROS. Medications reviewed.    Pause for the cause has been completed prior to the prceedure.   1. Km was identified by both name and date of birth   2. The correct site was identified   3. Site was marked by provider    4. Written informed consent correct and signed or verbal authorization  to proceed was obtained   5. Verifed necessary supplies, equipment, and diagnostics are available    6. Time out was performed immediately prior to procedure    Objective: The lesion(s) is/are of the above mentioned size and location and is/are round, variegated with dark black apsects. The area was prepped and appropriately anesthetized. Using the usual technique, full thickness elliptical excision in total was performed. An appropriate dressing was applied. The procedure was well tolerated and without complications.   Closed with 3 simple interrupted sutures of 4-0 prolene.     Assessment: rule out melanoma    Plan: Follow up: Return for suture removal in 7 days.. Wound care instructions provided.  Patient was instructed to be alert for any signs of cutaneous infection.     Andres Flor MD on 9/3/2019 at 2:04 PM

## 2019-09-03 NOTE — LETTER
September 9, 2019      Km Freedman  [address]  [place] [address]        Dear Km,     The skin biopsy was not cancer.  It is an age related spot called seborrheic keratosis.  They are very common.      Sincerely,        Andres Flor MD

## 2019-09-10 ENCOUNTER — ALLIED HEALTH/NURSE VISIT (OUTPATIENT)
Dept: FAMILY MEDICINE | Facility: OTHER | Age: 64
End: 2019-09-10
Attending: FAMILY MEDICINE
Payer: COMMERCIAL

## 2019-09-10 DIAGNOSIS — Z48.02 VISIT FOR SUTURE REMOVAL: Primary | ICD-10-CM

## 2019-09-10 NOTE — PROGRESS NOTES
"Verified patient's first and last name, and . Patient presents to clinic for suture removal. Lesion removed from back by Dr. Flor on 19. Per LOV note, \"Plan: Follow up: Return for suture removal in 7 days.\" Patient returning at exactly 7 days. Aseptic technique utilized. Bandaid removed. Cleansed suture site and removed bandaid glue with alcohol swabs. Incision site free from pain, swelling, bleeding, and drainage. Incision edges approximated. (3) sutures removed using suture removal kit. Area cleansed with ChloraPrep One-Step post-suture removal. Area air dried. Steri strip prep applied and (3) Steri Strips applied over incision. Edges remain approximated. Patient notified to let Steri Strips fall of on own and to not pull off. Patient stated understanding. Patient left clinic ambulatory.       Caitie MABRY, RN on 9/10/2019 at 9:43 AM    "

## 2019-10-15 DIAGNOSIS — E29.1 HYPOGONADISM IN MALE: Primary | ICD-10-CM

## 2019-10-21 ENCOUNTER — OFFICE VISIT (OUTPATIENT)
Dept: UROLOGY | Facility: OTHER | Age: 64
End: 2019-10-21
Attending: UROLOGY
Payer: COMMERCIAL

## 2019-10-21 VITALS
SYSTOLIC BLOOD PRESSURE: 132 MMHG | WEIGHT: 158 LBS | RESPIRATION RATE: 14 BRPM | BODY MASS INDEX: 23.33 KG/M2 | DIASTOLIC BLOOD PRESSURE: 78 MMHG

## 2019-10-21 DIAGNOSIS — E29.1 HYPOGONADISM IN MALE: Primary | ICD-10-CM

## 2019-10-21 PROCEDURE — 25000128 H RX IP 250 OP 636: Performed by: UROLOGY

## 2019-10-21 PROCEDURE — 99213 OFFICE O/P EST LOW 20 MIN: CPT | Performed by: UROLOGY

## 2019-10-21 PROCEDURE — 96372 THER/PROPH/DIAG INJ SC/IM: CPT

## 2019-10-21 RX ADMIN — TESTOSTERONE UNDECANOATE 750 MG: 250 INJECTION INTRAMUSCULAR at 09:21

## 2019-10-21 ASSESSMENT — PAIN SCALES - GENERAL: PAINLEVEL: MODERATE PAIN (4)

## 2019-10-21 NOTE — NURSING NOTE
"Pt presents to clinic for 10 week Aveed injection    Review of Systems:    Weight loss:    No     Recent fever/chills:  No   Night sweats:   No  Current skin rash:  No   Recent hair loss:  No  Heat intolerance:  No   Cold intolerance:  No  Chest pain:   No   Palpitations:   No  Shortness of breath:  No   Wheezing:   No  Constipation:    No   Diarrhea:   No   Nausea:   No   Vomiting:   No   Kidney/side pain:  No   Back pain:   Yes  Frequent headaches:  Yes   Dizziness:     No  Leg swelling:   No   Calf pain:    No    Patient given \"Aveed Treatment: A Patient Guide\" form.  Injection given and patient in clinic for 30 minutes after injection for monitoring.  Patient tolerated injection with no problems.  Batsheva Sheriff LPN .........10/21/2019...9:11 AM      "

## 2019-10-21 NOTE — PROGRESS NOTES
"Type of Visit  EST    Chief Complaint  Hypogonadism    HPI  Mr. Freedman is a 64 y.o. male who follows up with symptomatic hypogonadism.  Primary symptoms include low energy and poor sleep.  He has been on testosterone replacement for over 5 years.  He previously failed patches and short acting injections.    He continues to do well with AVEED.  He does have a history of elevated hematocrit that he has been treating with blood donation.  He denies any side effects since the last visit.  He continues to report improved energy level and he is satisfied with this form of treatment.      Review of Systems  I reviewed the ROS with the patient today.    Nursing Notes:   Batsheva Sheriff LPN  10/21/2019  9:25 AM  Signed  Pt presents to clinic for 10 week Aveed injection    Review of Systems:    Weight loss:    No     Recent fever/chills:  No   Night sweats:   No  Current skin rash:  No   Recent hair loss:  No  Heat intolerance:  No   Cold intolerance:  No  Chest pain:   No   Palpitations:   No  Shortness of breath:  No   Wheezing:   No  Constipation:    No   Diarrhea:   No   Nausea:   No   Vomiting:   No   Kidney/side pain:  No   Back pain:   Yes  Frequent headaches:  Yes   Dizziness:     No  Leg swelling:   No   Calf pain:    No    Patient given \"Aveed Treatment: A Patient Guide\" form.  Injection given and patient in clinic for 30 minutes after injection for monitoring.  Patient tolerated injection with no problems.  Batsheva Sheriff LPN .........10/21/2019...9:11 AM         Physical Exam  /78 (BP Location: Left arm, Patient Position: Sitting, Cuff Size: Adult Regular)   Resp 14   Wt 71.7 kg (158 lb)   BMI 23.33 kg/m    Constitutional: No acute distress.  Alert and cooperative   Head: NCAT  Eyes: Conjunctivae normal  Cardiovascular: Regular rate.  Pulmonary/Chest: Respirations are even and non-labored bilaterally, no audible wheezing  Abdominal: Soft. No distension, tenderness, masses or guarding. "   Extremities: GILDARDO x 4, Warm. No clubbing.  No cyanosis.    Skin: Pink, warm and dry.  No visible rashes noted.  Back:  No left CVA tenderness.  No right CVA tenderness.  Genitourinary:  Nonpalpable bladder    Labs  Results for TYRON JONES (MRN [id number]) as of 5/30/2019 08:17   5/16/2019 08:48   Prostate Specific Antigen 0.885   Testosterone Total 338   Hematocrit 54.8 (H)     Results for TYRON JONES (MRN [id number]) as of 1/10/2019 09:52   1/10/2019 09:00   Hematocrit 52.2     Results for TEJINDER JONES (MRN [id number]) as of 11/1/2018 09:04   3/22/2018 08:01 10/23/2018 09:53   Testosterone Total 398 367   Hematocrit 53.2 (H) 56.1 (H)     Results for TEJINDER JONES (MRN [id number]) as of 9/22/2016 09:15   3/7/2016 09:16   TESTOSTERONE,TOTAL 104.4 (L)     Results for TEJINDER JONES (MRN [id number]) as of 9/7/2017 09:37   9/7/2016 08:55 1/26/2017 08:33 8/2/2017 09:11   PSA TOTAL 0.739 0.867 0.875     Testosterone Injection  Today the patient received an injection of testosterone undecanoate 750mg.  This was given in the gluteus.  The results of this injection: None  Patient was monitored for 30 minutes following the injection.    Assessment  Hypogonadism- continue Aveed q10 weeks  We are getting labs every third cycle.  He is due for labs prior to the next treatment.  He does have a history of elevated hematocrit -he donates blood regularly    Plan  Labs in 8 weeks (hematocrit, T)  Aveed 750mg IM every 10 weeks

## 2019-10-31 ENCOUNTER — OFFICE VISIT (OUTPATIENT)
Dept: FAMILY MEDICINE | Facility: OTHER | Age: 64
End: 2019-10-31
Attending: FAMILY MEDICINE
Payer: COMMERCIAL

## 2019-10-31 VITALS
DIASTOLIC BLOOD PRESSURE: 68 MMHG | TEMPERATURE: 98.7 F | WEIGHT: 157.6 LBS | BODY MASS INDEX: 25.33 KG/M2 | HEART RATE: 87 BPM | HEIGHT: 66 IN | OXYGEN SATURATION: 98 % | SYSTOLIC BLOOD PRESSURE: 128 MMHG | RESPIRATION RATE: 16 BRPM

## 2019-10-31 DIAGNOSIS — Z23 FLU VACCINE NEED: ICD-10-CM

## 2019-10-31 DIAGNOSIS — M51.379 DEGENERATION OF LUMBAR OR LUMBOSACRAL INTERVERTEBRAL DISC: ICD-10-CM

## 2019-10-31 DIAGNOSIS — Z23 NEED FOR PNEUMOCOCCAL VACCINATION: ICD-10-CM

## 2019-10-31 DIAGNOSIS — E03.8 OTHER SPECIFIED HYPOTHYROIDISM: ICD-10-CM

## 2019-10-31 DIAGNOSIS — I10 ESSENTIAL HYPERTENSION: Primary | ICD-10-CM

## 2019-10-31 DIAGNOSIS — Z01.818 PREOP GENERAL PHYSICAL EXAM: ICD-10-CM

## 2019-10-31 LAB
ANION GAP SERPL CALCULATED.3IONS-SCNC: 4 MMOL/L (ref 3–14)
BUN SERPL-MCNC: 27 MG/DL (ref 7–25)
CALCIUM SERPL-MCNC: 9.5 MG/DL (ref 8.6–10.3)
CHLORIDE SERPL-SCNC: 103 MMOL/L (ref 98–107)
CO2 SERPL-SCNC: 33 MMOL/L (ref 21–31)
CREAT SERPL-MCNC: 1.21 MG/DL (ref 0.7–1.3)
GFR SERPL CREATININE-BSD FRML MDRD: 60 ML/MIN/{1.73_M2}
GLUCOSE SERPL-MCNC: 95 MG/DL (ref 70–105)
POTASSIUM SERPL-SCNC: 4.3 MMOL/L (ref 3.5–5.1)
SODIUM SERPL-SCNC: 140 MMOL/L (ref 134–144)
TSH SERPL DL<=0.05 MIU/L-ACNC: 0.97 IU/ML (ref 0.34–5.6)

## 2019-10-31 PROCEDURE — 99214 OFFICE O/P EST MOD 30 MIN: CPT | Mod: 25 | Performed by: FAMILY MEDICINE

## 2019-10-31 PROCEDURE — 93000 ELECTROCARDIOGRAM COMPLETE: CPT | Performed by: INTERNAL MEDICINE

## 2019-10-31 PROCEDURE — 90471 IMMUNIZATION ADMIN: CPT | Performed by: FAMILY MEDICINE

## 2019-10-31 PROCEDURE — 36415 COLL VENOUS BLD VENIPUNCTURE: CPT | Mod: ZL | Performed by: FAMILY MEDICINE

## 2019-10-31 PROCEDURE — 90670 PCV13 VACCINE IM: CPT | Performed by: FAMILY MEDICINE

## 2019-10-31 PROCEDURE — 84443 ASSAY THYROID STIM HORMONE: CPT | Mod: ZL | Performed by: FAMILY MEDICINE

## 2019-10-31 PROCEDURE — 90662 IIV NO PRSV INCREASED AG IM: CPT | Performed by: FAMILY MEDICINE

## 2019-10-31 PROCEDURE — 80048 BASIC METABOLIC PNL TOTAL CA: CPT | Mod: ZL | Performed by: FAMILY MEDICINE

## 2019-10-31 PROCEDURE — 90472 IMMUNIZATION ADMIN EACH ADD: CPT | Performed by: FAMILY MEDICINE

## 2019-10-31 ASSESSMENT — ANXIETY QUESTIONNAIRES
2. NOT BEING ABLE TO STOP OR CONTROL WORRYING: NOT AT ALL
7. FEELING AFRAID AS IF SOMETHING AWFUL MIGHT HAPPEN: NOT AT ALL
GAD7 TOTAL SCORE: 0
6. BECOMING EASILY ANNOYED OR IRRITABLE: NOT AT ALL
3. WORRYING TOO MUCH ABOUT DIFFERENT THINGS: NOT AT ALL
5. BEING SO RESTLESS THAT IT IS HARD TO SIT STILL: NOT AT ALL
IF YOU CHECKED OFF ANY PROBLEMS ON THIS QUESTIONNAIRE, HOW DIFFICULT HAVE THESE PROBLEMS MADE IT FOR YOU TO DO YOUR WORK, TAKE CARE OF THINGS AT HOME, OR GET ALONG WITH OTHER PEOPLE: NOT DIFFICULT AT ALL
1. FEELING NERVOUS, ANXIOUS, OR ON EDGE: NOT AT ALL

## 2019-10-31 ASSESSMENT — MIFFLIN-ST. JEOR: SCORE: 1441.59

## 2019-10-31 ASSESSMENT — PATIENT HEALTH QUESTIONNAIRE - PHQ9: 5. POOR APPETITE OR OVEREATING: NOT AT ALL

## 2019-10-31 ASSESSMENT — PAIN SCALES - GENERAL: PAINLEVEL: MILD PAIN (3)

## 2019-10-31 NOTE — NURSING NOTE
"Coming in for a pre-op physical    Chief Complaint   Patient presents with     Pre-Op Exam     11/18/2019       Initial /68 (BP Location: Right arm, Patient Position: Sitting, Cuff Size: Adult Large)   Pulse 87   Temp 98.7  F (37.1  C) (Tympanic)   Resp 16   Ht 1.683 m (5' 6.25\")   Wt 71.5 kg (157 lb 9.6 oz)   SpO2 98%   BMI 25.25 kg/m   Estimated body mass index is 25.25 kg/m  as calculated from the following:    Height as of this encounter: 1.683 m (5' 6.25\").    Weight as of this encounter: 71.5 kg (157 lb 9.6 oz).  Medication Reconciliation: complete    Kisha Valdovinos LPN  "

## 2019-10-31 NOTE — LETTER
November 1, 2019      Km Freedman  [address] [place] [address]        Dear Km,     This is great.    Results for orders placed or performed in visit on 10/31/19   TSH     Status: None   Result Value Ref Range    Thyrotropin 0.97 0.34 - 5.60 IU/mL   Basic Metabolic Panel     Status: Abnormal   Result Value Ref Range    Sodium 140 134 - 144 mmol/L    Potassium 4.3 3.5 - 5.1 mmol/L    Chloride 103 98 - 107 mmol/L    Carbon Dioxide 33 (H) 21 - 31 mmol/L    Anion Gap 4 3 - 14 mmol/L    Glucose 95 70 - 105 mg/dL    Urea Nitrogen 27 (H) 7 - 25 mg/dL    Creatinine 1.21 0.70 - 1.30 mg/dL    GFR Estimate 60 (L) >60 mL/min/[1.73_m2]    GFR Estimate If Black 73 >60 mL/min/[1.73_m2]    Calcium 9.5 8.6 - 10.3 mg/dL           Sincerely,        Andres Flor MD
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
## 2019-11-02 ASSESSMENT — ANXIETY QUESTIONNAIRES: GAD7 TOTAL SCORE: 0

## 2019-12-16 DIAGNOSIS — E29.1 HYPOGONADISM IN MALE: ICD-10-CM

## 2019-12-16 LAB
HCT VFR BLD AUTO: 52.1 % (ref 40–53)
TESTOST SERPL-MCNC: 323 NG/DL (ref 175–781)

## 2019-12-16 PROCEDURE — 36415 COLL VENOUS BLD VENIPUNCTURE: CPT | Mod: ZL | Performed by: UROLOGY

## 2019-12-16 PROCEDURE — 84403 ASSAY OF TOTAL TESTOSTERONE: CPT | Mod: ZL | Performed by: UROLOGY

## 2019-12-16 PROCEDURE — 85014 HEMATOCRIT: CPT | Mod: ZL | Performed by: UROLOGY

## 2019-12-17 ENCOUNTER — TRANSFERRED RECORDS (OUTPATIENT)
Dept: HEALTH INFORMATION MANAGEMENT | Facility: OTHER | Age: 64
End: 2019-12-17

## 2019-12-30 ENCOUNTER — OFFICE VISIT (OUTPATIENT)
Dept: UROLOGY | Facility: OTHER | Age: 64
End: 2019-12-30
Attending: FAMILY MEDICINE
Payer: COMMERCIAL

## 2019-12-30 VITALS
SYSTOLIC BLOOD PRESSURE: 130 MMHG | WEIGHT: 163 LBS | BODY MASS INDEX: 26.11 KG/M2 | HEART RATE: 64 BPM | DIASTOLIC BLOOD PRESSURE: 84 MMHG | RESPIRATION RATE: 16 BRPM

## 2019-12-30 DIAGNOSIS — E29.1 HYPOGONADISM IN MALE: Primary | ICD-10-CM

## 2019-12-30 PROCEDURE — 99213 OFFICE O/P EST LOW 20 MIN: CPT | Performed by: UROLOGY

## 2019-12-30 PROCEDURE — 96372 THER/PROPH/DIAG INJ SC/IM: CPT

## 2019-12-30 PROCEDURE — 25000128 H RX IP 250 OP 636: Performed by: UROLOGY

## 2019-12-30 RX ADMIN — TESTOSTERONE UNDECANOATE 750 MG: 250 INJECTION INTRAMUSCULAR at 09:15

## 2019-12-30 ASSESSMENT — PAIN SCALES - GENERAL: PAINLEVEL: MODERATE PAIN (5)

## 2019-12-30 NOTE — PROGRESS NOTES
Type of Visit  EST    Chief Complaint  Hypogonadism    HPI  Mr. Freedman is a 64 y.o. male who follows up with symptomatic hypogonadism.  Primary symptoms include low energy and poor sleep.  He has been on testosterone replacement for over 5 years.  He previously failed patches and short acting injections.    He continues to do well with AVEED.  He continues to donate blood on a regular basis to maintain a normal hematocrit level.  He did undergo labs 2 weeks ago and follows in 4 and AVEED injection as well as to review his labs.  Denies any changes in symptoms.      Review of Systems  I reviewed the ROS with the patient today.    Nursing Notes:   Batsheva Sheriff LPN  12/30/2019  9:09 AM  Signed  Pt presents to clinic for Aveed injection    Review of Systems:    Weight loss:    No     Recent fever/chills:  No   Night sweats:   No  Current skin rash:  No   Recent hair loss:  No  Heat intolerance:  No   Cold intolerance:  No  Chest pain:   No   Palpitations:   No  Shortness of breath:  No   Wheezing:   No  Constipation:    No   Diarrhea:   No   Nausea:   No   Vomiting:   No   Kidney/side pain:  No   Back pain:   Yes  Frequent headaches:  Yes   Dizziness:     No  Leg swelling:   No   Calf pain:    Yes           Physical Exam  /84 (BP Location: Right arm, Patient Position: Sitting, Cuff Size: Adult Regular)   Pulse 64   Resp 16   Wt 73.9 kg (163 lb)   BMI 26.11 kg/m    Constitutional: No acute distress.  Alert and cooperative   Head: NCAT  Eyes: Conjunctivae normal  Cardiovascular: Regular rate.  Pulmonary/Chest: Respirations are even and non-labored bilaterally, no audible wheezing  Abdominal: Soft. No distension, tenderness, masses or guarding.   Extremities: GILDARDO x 4, Warm. No clubbing.  No cyanosis.    Skin: Pink, warm and dry.  No visible rashes noted.  Back:  No left CVA tenderness.  No right CVA tenderness.  Genitourinary:  Nonpalpable bladder    Labs  Results for TYRON FREEDMAN (MRN [id number]) as of  12/30/2019 09:17   12/16/2019 08:47   Testosterone Total 323   Hematocrit 52.1     Results fr TYRON JONES (MRN [id number]) as of 5/30/2019 08:17   5/16/2019 08:48   Prostate Specific Antigen 0.885   Testosterone Total 338   Hematocrit 54.8 (H)     Results for TEJINDER JONES (MRN [id number]) as of 9/22/2016 09:15   3/7/2016 09:16   TESTOSTERONE,TOTAL 104.4 (L)     Testosterone Injection  Today the patient received an injection of testosterone undecanoate 750mg.  This was given in the gluteus.  The results of this injection: None  Patient was monitored for 30 minutes following the injection.    Assessment  Hypogonadism- continue Aveed q10 weeks  Labs reviewed and WNL.    He does have a history of elevated hematocrit -he donates blood regularly    Plan  Labs in 38 weeks (hematocrit, T)  Aveed 750mg IM every 10 weeks

## 2019-12-30 NOTE — NURSING NOTE
Pt presents to clinic for Aveed injection    Review of Systems:    Weight loss:    No     Recent fever/chills:  No   Night sweats:   No  Current skin rash:  No   Recent hair loss:  No  Heat intolerance:  No   Cold intolerance:  No  Chest pain:   No   Palpitations:   No  Shortness of breath:  No   Wheezing:   No  Constipation:    No   Diarrhea:   No   Nausea:   No   Vomiting:   No   Kidney/side pain:  No   Back pain:   Yes  Frequent headaches:  Yes   Dizziness:     No  Leg swelling:   No   Calf pain:    Yes

## 2020-01-14 ENCOUNTER — OFFICE VISIT (OUTPATIENT)
Dept: FAMILY MEDICINE | Facility: OTHER | Age: 65
End: 2020-01-14
Attending: FAMILY MEDICINE
Payer: COMMERCIAL

## 2020-01-14 VITALS
WEIGHT: 163.6 LBS | OXYGEN SATURATION: 97 % | DIASTOLIC BLOOD PRESSURE: 74 MMHG | HEART RATE: 90 BPM | SYSTOLIC BLOOD PRESSURE: 124 MMHG | TEMPERATURE: 98.6 F | BODY MASS INDEX: 26.21 KG/M2 | RESPIRATION RATE: 16 BRPM

## 2020-01-14 DIAGNOSIS — M51.379 DEGENERATION OF LUMBAR OR LUMBOSACRAL INTERVERTEBRAL DISC: ICD-10-CM

## 2020-01-14 DIAGNOSIS — M96.1 FAILED BACK SYNDROME OF LUMBAR SPINE: ICD-10-CM

## 2020-01-14 PROCEDURE — G0463 HOSPITAL OUTPT CLINIC VISIT: HCPCS

## 2020-01-14 PROCEDURE — 99213 OFFICE O/P EST LOW 20 MIN: CPT | Performed by: FAMILY MEDICINE

## 2020-01-14 RX ORDER — TRAMADOL HYDROCHLORIDE 50 MG/1
50 TABLET ORAL EVERY 6 HOURS PRN
Qty: 120 TABLET | Refills: 5 | Status: SHIPPED | OUTPATIENT
Start: 2020-01-14 | End: 2020-03-16

## 2020-01-14 ASSESSMENT — ENCOUNTER SYMPTOMS
FATIGUE: 0
FEVER: 0
BACK PAIN: 1
WEAKNESS: 0

## 2020-01-14 ASSESSMENT — PAIN SCALES - GENERAL: PAINLEVEL: MILD PAIN (3)

## 2020-01-14 ASSESSMENT — ANXIETY QUESTIONNAIRES
1. FEELING NERVOUS, ANXIOUS, OR ON EDGE: NOT AT ALL
IF YOU CHECKED OFF ANY PROBLEMS ON THIS QUESTIONNAIRE, HOW DIFFICULT HAVE THESE PROBLEMS MADE IT FOR YOU TO DO YOUR WORK, TAKE CARE OF THINGS AT HOME, OR GET ALONG WITH OTHER PEOPLE: NOT DIFFICULT AT ALL
3. WORRYING TOO MUCH ABOUT DIFFERENT THINGS: NOT AT ALL
GAD7 TOTAL SCORE: 0
6. BECOMING EASILY ANNOYED OR IRRITABLE: NOT AT ALL
5. BEING SO RESTLESS THAT IT IS HARD TO SIT STILL: NOT AT ALL
7. FEELING AFRAID AS IF SOMETHING AWFUL MIGHT HAPPEN: NOT AT ALL
2. NOT BEING ABLE TO STOP OR CONTROL WORRYING: NOT AT ALL

## 2020-01-14 ASSESSMENT — PATIENT HEALTH QUESTIONNAIRE - PHQ9: 5. POOR APPETITE OR OVEREATING: NOT AT ALL

## 2020-01-14 NOTE — PROGRESS NOTES
SUBJECTIVE:   Km Freedman is a 64 year old male who presents to clinic today for the following health issues:    HPI  Follow up on his pain.  Had a new stimulator implanted. Had to wait 1 week to turn it on, he was not hesham eit was even working, so had a follow up with them, and reprogrammed it. Feels it has helped a little.  Has dropped the pain scale about 2-3 points for him.  Mostly at 3/10 now. Sleeping better.  For meds he is using medical THC as well as ultram and due for a refill on the tramadol.    Last tox screen was 2/18, was as expected.       Patient Active Problem List    Diagnosis Date Noted     Other specified hypothyroidism 05/30/2019     Priority: Medium     Arthropathy 02/13/2018     Priority: Medium     Overview:   Degenerative facet arthritis in the low back       Chondromalacia patellae 02/13/2018     Priority: Medium     Overview:   Degenerative arthritis, chondromalacia patella left knee       Depression, major, recurrent, mild (H) 02/13/2018     Priority: Medium     Degeneration of lumbar or lumbosacral intervertebral disc 02/13/2018     Priority: Medium     Hypercholesterolemia 02/13/2018     Priority: Medium     Lateral epicondylitis of right elbow 02/13/2018     Priority: Medium     Leg cramps 02/13/2018     Priority: Medium     Overview:   Chronic       Neck pain, chronic 02/13/2018     Priority: Medium     Overview:   more prominent on the right       Restless leg syndrome 02/13/2018     Priority: Medium     Muscle pain 08/31/2017     Priority: Medium     Elevated CPK 08/24/2017     Priority: Medium     Bilateral calf pain 08/11/2017     Priority: Medium     Hypogonadism in male 04/25/2016     Priority: Medium     Failed back syndrome of lumbar spine 03/07/2016     Priority: Medium     Medication management 12/07/2015     Priority: Medium     Essential hypertension 10/30/2014     Priority: Medium     Pain medication agreement 12/16/2013     Priority: Medium     Overview:   Methadone  10 mg #30.   Signed 05/17/2013, UPDATED 1/2014, updated 3/7/2016       Enthesopathy of hip region 05/02/2011     Priority: Medium     Esophageal reflux 12/30/2009     Priority: Medium     Past Surgical History:   Procedure Laterality Date     FUSION LUMBAR ANTERIOR ONE LEVEL  1999    interbody fusion L4-5     FUSION LUMBAR ANTERIOR ONE LEVEL  01/24/2001    L5-S1 discectomy with anterior interbody fusion L4-5     IR RHIZOTOMY  2001    Has had 2 rhizomoties     IR RHIZOTOMY Left 2000    medial branch     LAMINECT/DISCECTOMY, LUMBAR  1998    NEURO,LUMBAR DISKECTOMY, L5-S1     Social History     Tobacco Use     Smoking status: Never Smoker     Smokeless tobacco: Never Used   Substance Use Topics     Alcohol use: No     Current Outpatient Medications   Medication Sig Dispense Refill     levothyroxine (SYNTHROID/LEVOTHROID) 50 MCG tablet Take 1 tablet (50 mcg) by mouth daily 90 tablet 3     lisinopril-hydrochlorothiazide (PRINZIDE/ZESTORETIC) 10-12.5 MG tablet Take 1 tablet by mouth daily 90 tablet 3     medical cannabis (Patient's own supply.  Not a prescription) 1 Dose See Admin Instructions (This is NOT a prescription, and does not certify that the patient has a qualifying medical condition for medical cannabis.  The purpose of this order is  to document that the patient reports taking medical cannabis.)       sertraline (ZOLOFT) 50 MG tablet TAKE 1 TABLET (50 MG) BY MOUTH DAILY 90 tablet 3     traMADol (ULTRAM) 50 MG tablet Take 1 tablet (50 mg) by mouth every 6 hours as needed for severe pain 120 tablet 5     triamcinolone (KENALOG) 0.1 % cream        No Known Allergies    Review of Systems   Constitutional: Negative for fatigue and fever.   Musculoskeletal: Positive for back pain.   Neurological: Negative for weakness.        OBJECTIVE:     /74   Pulse 90   Temp 98.6  F (37  C) (Tympanic)   Resp 16   Wt 74.2 kg (163 lb 9.6 oz)   SpO2 97%   BMI 26.21 kg/m    Body mass index is 26.21 kg/m .  Physical  Exam  Constitutional:       Appearance: Normal appearance.   Neurological:      General: No focal deficit present.      Mental Status: He is alert and oriented to person, place, and time.   Psychiatric:         Mood and Affect: Mood normal.         Behavior: Behavior normal.         Thought Content: Thought content normal.         Diagnostic Test Results:  none     ASSESSMENT/PLAN:         (M51.37) Degeneration of lumbar or lumbosacral intervertebral disc  Comment: has tried a large number of modalities.  Is generally about the same.  Refilled the tramadol  Plan: traMADol (ULTRAM) 50 MG tablet             (M96.1) Failed back syndrome of lumbar spine  Comment:    Plan: traMADol (ULTRAM) 50 MG tablet                 Andres Flor MD  [facility]

## 2020-01-14 NOTE — NURSING NOTE
"Coming in for a medication check up    Chief Complaint   Patient presents with     Recheck Medication     check up- pain       Initial /74   Pulse 90   Temp 98.6  F (37  C) (Tympanic)   Resp 16   Wt 74.2 kg (163 lb 9.6 oz)   SpO2 97%   BMI 26.21 kg/m   Estimated body mass index is 26.21 kg/m  as calculated from the following:    Height as of 10/31/19: 1.683 m (5' 6.25\").    Weight as of this encounter: 74.2 kg (163 lb 9.6 oz).  Medication Reconciliation: complete    Kisha Valdovinos LPN  "

## 2020-01-15 ASSESSMENT — ANXIETY QUESTIONNAIRES: GAD7 TOTAL SCORE: 0

## 2020-03-05 ENCOUNTER — OFFICE VISIT (OUTPATIENT)
Dept: UROLOGY | Facility: OTHER | Age: 65
End: 2020-03-05
Attending: UROLOGY
Payer: COMMERCIAL

## 2020-03-05 VITALS
RESPIRATION RATE: 16 BRPM | WEIGHT: 168.2 LBS | BODY MASS INDEX: 26.94 KG/M2 | SYSTOLIC BLOOD PRESSURE: 130 MMHG | HEART RATE: 88 BPM | DIASTOLIC BLOOD PRESSURE: 84 MMHG

## 2020-03-05 DIAGNOSIS — E29.1 HYPOGONADISM IN MALE: Primary | ICD-10-CM

## 2020-03-05 PROCEDURE — 96372 THER/PROPH/DIAG INJ SC/IM: CPT

## 2020-03-05 PROCEDURE — 25000128 H RX IP 250 OP 636: Performed by: UROLOGY

## 2020-03-05 PROCEDURE — 99213 OFFICE O/P EST LOW 20 MIN: CPT | Performed by: UROLOGY

## 2020-03-05 RX ADMIN — TESTOSTERONE UNDECANOATE 750 MG: 250 INJECTION INTRAMUSCULAR at 08:45

## 2020-03-05 ASSESSMENT — PAIN SCALES - GENERAL: PAINLEVEL: MODERATE PAIN (4)

## 2020-03-05 NOTE — NURSING NOTE
"Pt presents to clinic for Aveed injection    Review of Systems:    Weight loss:    No     Recent fever/chills:  No   Night sweats:   No  Current skin rash:  No   Recent hair loss:  No  Heat intolerance:  No   Cold intolerance:  No  Chest pain:   No   Palpitations:   No  Shortness of breath:  No   Wheezing:   No  Constipation:    No   Diarrhea:   No   Nausea:   No   Vomiting:   No   Kidney/side pain:  No   Back pain:   Yes  Frequent headaches:  No   Dizziness:     No  Leg swelling:   No   Calf pain:    Yes    Patient given \"Aveed Treatment: A Patient Guide\" form.  Injection given and patient in clinic for 30 minutes after injection for monitoring.  Patient tolerated injection with no problems.  Batsheva Sheriff LPN.........3/5/2020...8:14 AM      "

## 2020-03-05 NOTE — PROGRESS NOTES
"Type of Visit  EST    Chief Complaint  Hypogonadism    HPI  Mr. Freedman is a 64 y.o. male who follows up with symptomatic hypogonadism.  Primary symptoms include low energy and poor sleep.  He has been on testosterone replacement for over 5 years.  He previously failed patches and short acting injections.    He continues to do well with AVEED.  He continues to donate blood on a regular basis to maintain a normal hematocrit level.  His recent labs were within normal limits.  He presents today for a dose of AVEED.  He denies side effects and continues to report improved energy.      Review of Systems  I reviewed the ROS with the patient today.    Nursing Notes:   Batsheva Sheriff LPN  3/5/2020  8:34 AM  Addendum  Pt presents to clinic for Aveed injection    Review of Systems:    Weight loss:    No     Recent fever/chills:  No   Night sweats:   No  Current skin rash:  No   Recent hair loss:  No  Heat intolerance:  No   Cold intolerance:  No  Chest pain:   No   Palpitations:   No  Shortness of breath:  No   Wheezing:   No  Constipation:    No   Diarrhea:   No   Nausea:   No   Vomiting:   No   Kidney/side pain:  No   Back pain:   Yes  Frequent headaches:  No   Dizziness:     No  Leg swelling:   No   Calf pain:    Yes    Patient given \"Aveed Treatment: A Patient Guide\" form.  Injection given and patient in clinic for 30 minutes after injection for monitoring.  Patient tolerated injection with no problems.  Batsheva Sheriff LPN.........3/5/2020...8:14 AM         Physical Exam  /84 (BP Location: Left arm, Patient Position: Sitting, Cuff Size: Adult Regular)   Pulse 88   Resp 16   Wt 76.3 kg (168 lb 3.2 oz)   BMI 26.94 kg/m    Constitutional: No acute distress.  Alert and cooperative   Head: NCAT  Eyes: Conjunctivae normal  Cardiovascular: Regular rate.  Pulmonary/Chest: Respirations are even and non-labored bilaterally, no audible wheezing  Abdominal: Soft. No distension, tenderness, masses or guarding. "   Extremities: GILDARDO x 4, Warm. No clubbing.  No cyanosis.    Skin: Pink, warm and dry.  No visible rashes noted.  Back:  No left CVA tenderness.  No right CVA tenderness.  Genitourinary:  Nonpalpable bladder    Labs  Results for TYRON JONES (MRN [id number]) as of 12/30/2019 09:17   12/16/2019 08:47   Testosterone Total 323   Hematocrit 52.1     Results fr TYRON JONES (MRN [id number]) as of 5/30/2019 08:17   5/16/2019 08:48   Prostate Specific Antigen 0.885   Testosterone Total 338   Hematocrit 54.8 (H)     Results for TEJINDER JONES (MRN [id number]) as of 9/22/2016 09:15   3/7/2016 09:16   TESTOSTERONE,TOTAL 104.4 (L)     Testosterone Injection  Today the patient received an injection of testosterone undecanoate 750mg.  This was given in the gluteus.  The results of this injection: None  Patient was monitored for 30 minutes following the injection.    Assessment  Hypogonadism- continue Aveed q10 weeks  Labs reviewed and WNL.    He does have a history of elevated hematocrit -he donates blood regularly    Plan  Labs in 28 weeks (hematocrit, T)  Aveed 750mg IM every 10 weeks

## 2020-03-16 DIAGNOSIS — M51.379 DEGENERATION OF LUMBAR OR LUMBOSACRAL INTERVERTEBRAL DISC: Primary | ICD-10-CM

## 2020-03-16 DIAGNOSIS — M96.1 FAILED BACK SYNDROME OF LUMBAR SPINE: ICD-10-CM

## 2020-03-16 NOTE — TELEPHONE ENCOUNTER
Noted refill request in call pool and appears as though it was never routed to provider, as noted below. Routing at this time for refill consideration. Andra Morataya RN .............. 3/16/2020  5:29 PM

## 2020-03-16 NOTE — TELEPHONE ENCOUNTER
Routing refill request to provider for review/approval because:  Drug not on the [facility] refill protocol     Batsheva Ferreira RN on 3/16/2020 at 2:50 PM

## 2020-03-17 RX ORDER — TRAMADOL HYDROCHLORIDE 50 MG/1
50 TABLET ORAL EVERY 6 HOURS PRN
Qty: 120 TABLET | Refills: 3 | Status: SHIPPED | OUTPATIENT
Start: 2020-03-17 | End: 2020-06-17

## 2020-05-14 ENCOUNTER — OFFICE VISIT (OUTPATIENT)
Dept: UROLOGY | Facility: OTHER | Age: 65
End: 2020-05-14
Attending: UROLOGY
Payer: COMMERCIAL

## 2020-05-14 VITALS
HEART RATE: 88 BPM | SYSTOLIC BLOOD PRESSURE: 140 MMHG | WEIGHT: 159.6 LBS | RESPIRATION RATE: 16 BRPM | BODY MASS INDEX: 25.57 KG/M2 | DIASTOLIC BLOOD PRESSURE: 88 MMHG

## 2020-05-14 DIAGNOSIS — E29.1 HYPOGONADISM IN MALE: Primary | ICD-10-CM

## 2020-05-14 PROCEDURE — 99213 OFFICE O/P EST LOW 20 MIN: CPT | Performed by: UROLOGY

## 2020-05-14 PROCEDURE — 25000128 H RX IP 250 OP 636: Performed by: UROLOGY

## 2020-05-14 PROCEDURE — 96372 THER/PROPH/DIAG INJ SC/IM: CPT

## 2020-05-14 RX ADMIN — TESTOSTERONE UNDECANOATE 750 MG: 250 INJECTION INTRAMUSCULAR at 08:53

## 2020-05-14 ASSESSMENT — PAIN SCALES - GENERAL: PAINLEVEL: MODERATE PAIN (4)

## 2020-05-14 NOTE — NURSING NOTE
"Presents to clinic for Aveed injection    Review of Systems:    Weight loss:    No     Recent fever/chills:  No   Night sweats:   No  Current skin rash:  No   Recent hair loss:  No  Heat intolerance:  No   Cold intolerance:  No  Chest pain:   No   Palpitations:   No  Shortness of breath:  No   Wheezing:   No  Constipation:    No   Diarrhea:   No   Nausea:   No   Vomiting:   No   Kidney/side pain:  No   Back pain:   Yes  Frequent headaches:  No   Dizziness:     No  Leg swelling:   No   Calf pain:    Yes    Patient given \"Aveed Treatment: A Patient Guide\" form.  Injection given and patient in clinic for 30 minutes after injection for monitoring.  Patient tolerated injection with no problems.  Batsheva Sheriff LPN .........5/14/2020...8:50 AM      "

## 2020-05-14 NOTE — PROGRESS NOTES
"Type of Visit  EST    Chief Complaint  Hypogonadism    HPI  Mr. Freedman is a 64 y.o. male who follows up with symptomatic hypogonadism.  Primary symptoms include low energy and poor sleep.  He has been on testosterone replacement for over 5 years.  He previously failed patches and short acting injections.    He continues to do well with AVEED.  He continues to donate blood on a regular basis to maintain a normal hematocrit level.  He underwent labs this past winter which were within normal limits.  He follows up today for dose of AVEED.  He continues to report no side effects and is satisfied with the treatment.      Review of Systems  I reviewed the ROS with the patient today.    Nursing Notes:   Batsheva Sheriff LPN  5/14/2020  9:06 AM  Signed  Presents to clinic for Aveed injection    Review of Systems:    Weight loss:    No     Recent fever/chills:  No   Night sweats:   No  Current skin rash:  No   Recent hair loss:  No  Heat intolerance:  No   Cold intolerance:  No  Chest pain:   No   Palpitations:   No  Shortness of breath:  No   Wheezing:   No  Constipation:    No   Diarrhea:   No   Nausea:   No   Vomiting:   No   Kidney/side pain:  No   Back pain:   Yes  Frequent headaches:  No   Dizziness:     No  Leg swelling:   No   Calf pain:    Yes    Patient given \"Aveed Treatment: A Patient Guide\" form.  Injection given and patient in clinic for 30 minutes after injection for monitoring.  Patient tolerated injection with no problems.  Batsheva Sheriff LPN .........5/14/2020...8:50 AM         Physical Exam  BP (!) 140/88 (BP Location: Left arm, Patient Position: Sitting, Cuff Size: Adult Regular)   Pulse 88   Resp 16   Wt 72.4 kg (159 lb 9.6 oz)   BMI 25.57 kg/m    Constitutional: No acute distress.  Alert and cooperative   Head: NCAT  Eyes: Conjunctivae normal  Cardiovascular: Regular rate.  Pulmonary/Chest: Respirations are even and non-labored bilaterally, no audible wheezing  Abdominal: Soft. No distension, " tenderness, masses or guarding.   Extremities: GILDARDO x 4, Warm. No clubbing.  No cyanosis.    Skin: Pink, warm and dry.  No visible rashes noted.  Back:  No left CVA tenderness.  No right CVA tenderness.  Genitourinary:  Nonpalpable bladder    Labs  Results for TYRON JONES (MRN [id number]) as of 12/30/2019 09:17   12/16/2019 08:47   Testosterone Total 323   Hematocrit 52.1     Results fr TYRON JONES (MRN [id number]) as of 5/30/2019 08:17   5/16/2019 08:48   Prostate Specific Antigen 0.885   Testosterone Total 338   Hematocrit 54.8 (H)     Results for TEJINDER JONES (MRN [id number]) as of 9/22/2016 09:15   3/7/2016 09:16   TESTOSTERONE,TOTAL 104.4 (L)     Testosterone Injection  Today the patient received an injection of testosterone undecanoate 750mg.  This was given in the gluteus.  The results of this injection: None  Patient was monitored for 30 minutes following the injection.    Assessment  Hypogonadism- continue Aveed q10 weeks  Labs reviewed and WNL.    He does have a history of elevated hematocrit -he donates blood regularly    Plan  Labs in 20 weeks (hematocrit, T)  Aveed 750mg IM every 10 weeks

## 2020-06-15 ENCOUNTER — TELEPHONE (OUTPATIENT)
Dept: FAMILY MEDICINE | Facility: OTHER | Age: 65
End: 2020-06-15

## 2020-06-15 DIAGNOSIS — Z11.59 SCREENING FOR VIRAL DISEASE: Primary | ICD-10-CM

## 2020-06-15 NOTE — TELEPHONE ENCOUNTER
Patient is traveling to [place] for 9 days.  [place] is requiring travelers to have the COVID-19 test 72 hours prior to flying.  COVID-19 test ordered to be completed on 6/29/2020.    Please put the patient in the COVID-19 curbside schedule on 6/29/2020 at 9 AM.  Marilu Villafana PA-C.......... 6/15/2020 10:49 AM

## 2020-06-17 ENCOUNTER — VIRTUAL VISIT (OUTPATIENT)
Dept: FAMILY MEDICINE | Facility: OTHER | Age: 65
End: 2020-06-17
Attending: FAMILY MEDICINE
Payer: COMMERCIAL

## 2020-06-17 DIAGNOSIS — M96.1 FAILED BACK SYNDROME OF LUMBAR SPINE: Primary | ICD-10-CM

## 2020-06-17 DIAGNOSIS — E03.8 OTHER SPECIFIED HYPOTHYROIDISM: ICD-10-CM

## 2020-06-17 DIAGNOSIS — M51.379 DEGENERATION OF LUMBAR OR LUMBOSACRAL INTERVERTEBRAL DISC: ICD-10-CM

## 2020-06-17 PROCEDURE — 99212 OFFICE O/P EST SF 10 MIN: CPT | Mod: TEL | Performed by: FAMILY MEDICINE

## 2020-06-17 RX ORDER — LEVOTHYROXINE SODIUM 50 UG/1
50 TABLET ORAL DAILY
Qty: 90 TABLET | Refills: 3 | Status: SHIPPED | OUTPATIENT
Start: 2020-06-17 | End: 2021-08-10

## 2020-06-17 RX ORDER — TRAMADOL HYDROCHLORIDE 50 MG/1
50 TABLET ORAL EVERY 6 HOURS PRN
Qty: 120 TABLET | Refills: 5 | Status: SHIPPED | OUTPATIENT
Start: 2020-06-17 | End: 2021-01-15

## 2020-06-17 ASSESSMENT — ANXIETY QUESTIONNAIRES
2. NOT BEING ABLE TO STOP OR CONTROL WORRYING: NOT AT ALL
1. FEELING NERVOUS, ANXIOUS, OR ON EDGE: NOT AT ALL
5. BEING SO RESTLESS THAT IT IS HARD TO SIT STILL: NOT AT ALL
GAD7 TOTAL SCORE: 0
IF YOU CHECKED OFF ANY PROBLEMS ON THIS QUESTIONNAIRE, HOW DIFFICULT HAVE THESE PROBLEMS MADE IT FOR YOU TO DO YOUR WORK, TAKE CARE OF THINGS AT HOME, OR GET ALONG WITH OTHER PEOPLE: NOT DIFFICULT AT ALL
6. BECOMING EASILY ANNOYED OR IRRITABLE: NOT AT ALL
3. WORRYING TOO MUCH ABOUT DIFFERENT THINGS: NOT AT ALL
7. FEELING AFRAID AS IF SOMETHING AWFUL MIGHT HAPPEN: NOT AT ALL

## 2020-06-17 ASSESSMENT — PATIENT HEALTH QUESTIONNAIRE - PHQ9: 5. POOR APPETITE OR OVEREATING: NOT AT ALL

## 2020-06-17 NOTE — PROGRESS NOTES
"Km Freedman is a 64 year old male who is being evaluated via a billable telephone visit.      The patient has been notified of following:     \"This telephone visit will be conducted via a call between you and your physician/provider. We have found that certain health care needs can be provided without the need for a physical exam.  This service lets us provide the care you need with a short phone conversation.  If a prescription is necessary we can send it directly to your pharmacy.  If lab work is needed we can place an order for that and you can then stop by our lab to have the test done at a later time.    Telephone visits are billed at different rates depending on your insurance coverage. During this emergency period, for some insurers they may be billed the same as an in-person visit.  Please reach out to your insurance provider with any questions.    If during the course of the call the physician/provider feels a telephone visit is not appropriate, you will not be charged for this service.\"    Patient has given verbal consent for Telephone visit?  Yes    What phone number would you like to be contacted at? [phone]    How would you like to obtain your AVS?     Subjective     Km Freedman is a 64 year old male who presents via phone visit today for the following health issues:    HPI  Medication Followup of Tramadol    Taking Medication as prescribed: yes    Side Effects:  None    Medication Helping Symptoms:  yes      Follow up on chronic pain.  He feel it is about the same, 4-5/10 or so  Is constant.  Sometimes will wake at 2:00 AM and take a tramadol.  This helps him get moving better once he gets out of bed.  Pain remains in the low back and into both legs.  Last had injections about 3 years ago.  Has a stimulator placed, but he cannot tell if it is working or not.  He had a follow up at [facility] pain clinic last December. I reviewed that note.  He feels 4 tabs occupational therapy tramadol daily " is enough. Is actually sleeping well.           Current Outpatient Medications   Medication Sig Dispense Refill     levothyroxine (SYNTHROID/LEVOTHROID) 50 MCG tablet Take 1 tablet (50 mcg) by mouth daily 90 tablet 3     lisinopril-hydrochlorothiazide (PRINZIDE/ZESTORETIC) 10-12.5 MG tablet Take 1 tablet by mouth daily 90 tablet 3     medical cannabis (Patient's own supply.  Not a prescription) 1 Dose See Admin Instructions (This is NOT a prescription, and does not certify that the patient has a qualifying medical condition for medical cannabis.  The purpose of this order is  to document that the patient reports taking medical cannabis.)       sertraline (ZOLOFT) 50 MG tablet TAKE 1 TABLET (50 MG) BY MOUTH DAILY 90 tablet 3     traMADol (ULTRAM) 50 MG tablet TAKE 1 TABLET (50 MG) BY MOUTH EVERY 6 HOURS AS NEEDED FOR SEVERE PAIN 120 tablet 3     triamcinolone (KENALOG) 0.1 % cream        No Known Allergies    Reviewed and updated as needed this visit by Provider         Review of Systems           Objective   Reported vitals:  There were no vitals taken for this visit.   healthy, alert and no distress  PSYCH: Alert and oriented times 3; coherent speech, normal   rate and volume, able to articulate logical thoughts, able   to abstract reason, no tangential thoughts, no hallucinations   or delusions  His affect is normal  RESP: No cough, no audible wheezing, able to talk in full sentences  Remainder of exam unable to be completed due to telephone visits    Diagnostic Test Results:  Labs reviewed in Epic        Assessment/Plan:  1. Other specified hypothyroidism  Stable.  refilled  - levothyroxine (SYNTHROID/LEVOTHROID) 50 MCG tablet; Take 1 tablet (50 mcg) by mouth daily  Dispense: 90 tablet; Refill: 3    2. Degeneration of lumbar or lumbosacral intervertebral disc  Stable, refilled this for 6 months  - traMADol (ULTRAM) 50 MG tablet; Take 1 tablet (50 mg) by mouth every 6 hours as needed for severe pain  Dispense: 120  tablet; Refill: 5    3. Failed back syndrome of lumbar spine     - traMADol (ULTRAM) 50 MG tablet; Take 1 tablet (50 mg) by mouth every 6 hours as needed for severe pain  Dispense: 120 tablet; Refill: 5    No follow-ups on file.      Phone call duration:  9 minutes    Andres Flor MD

## 2020-06-17 NOTE — NURSING NOTE
regards to getting his medication refilled    Chief Complaint   Patient presents with     Medication Request     needs refill       Kisha Valdovinos LPN on 6/17/2020 at 10:02 AM

## 2020-06-18 ASSESSMENT — ANXIETY QUESTIONNAIRES: GAD7 TOTAL SCORE: 0

## 2020-06-29 ENCOUNTER — TELEPHONE (OUTPATIENT)
Dept: FAMILY MEDICINE | Facility: OTHER | Age: 65
End: 2020-06-29

## 2020-06-29 ENCOUNTER — ALLIED HEALTH/NURSE VISIT (OUTPATIENT)
Dept: FAMILY MEDICINE | Facility: OTHER | Age: 65
End: 2020-06-29
Attending: PHYSICIAN ASSISTANT
Payer: COMMERCIAL

## 2020-06-29 DIAGNOSIS — Z11.59 SCREENING FOR VIRAL DISEASE: ICD-10-CM

## 2020-06-29 DIAGNOSIS — T75.3XXA MOTION SICKNESS, INITIAL ENCOUNTER: Primary | ICD-10-CM

## 2020-06-29 PROCEDURE — U0003 INFECTIOUS AGENT DETECTION BY NUCLEIC ACID (DNA OR RNA); SEVERE ACUTE RESPIRATORY SYNDROME CORONAVIRUS 2 (SARS-COV-2) (CORONAVIRUS DISEASE [COVID-19]), AMPLIFIED PROBE TECHNIQUE, MAKING USE OF HIGH THROUGHPUT TECHNOLOGIES AS DESCRIBED BY CMS-2020-01-R: HCPCS | Mod: ZL | Performed by: PHYSICIAN ASSISTANT

## 2020-06-29 PROCEDURE — C9803 HOPD COVID-19 SPEC COLLECT: HCPCS

## 2020-06-29 PROCEDURE — 99207 ZZC NO CHARGE NURSE ONLY: CPT

## 2020-06-29 NOTE — LETTER
July 1, 2020        Km Freedman  [address]  [place] [address]    This letter provides a written record that you were tested for COVID-19 on 6/29/20.       Your result was negative. This means that we didn t find the virus that causes COVID-19 in your sample. A test may show negative when you do actually have the virus. This can happen when the virus is in the early stages of infection, before you feel illness symptoms.    If you have symptoms   Stay home and away from others (self-isolate) until you meet ALL of the guidelines below:    You ve had no fever--and no medicine that reduces fever--for 3 full days (72 hours). And      Your other symptoms have gotten better. For example, your cough or breathing has improved. And     At least 10 days have passed since your symptoms started.    During this time:    Stay home. Don t go to work, school or anywhere else.     Stay in your own room, including for meals. Use your own bathroom if you can.    Stay away from others in your home. No hugging, kissing or shaking hands. No visitors.    Clean  high touch  surfaces often (doorknobs, counters, handles, etc.). Use a household cleaning spray or wipes. You can find a full list on the EPA website at www.epa.gov/pesticide-registration/list-n-disinfectants-use-against-sars-cov-2.    Cover your mouth and nose with a mask, tissue or washcloth to avoid spreading germs.    Wash your hands and face often with soap and water.    Going back to work  Check with your employer for any guidelines to follow for going back to work.    Employers: This document serves as formal notice that your employee tested negative for COVID-19, as of the testing date shown above.

## 2020-06-29 NOTE — TELEPHONE ENCOUNTER
Patients wife called in regards to getting something for motion sickness. She states they are going on a trip and will be on a boat. Please call him with any questions. Thank you. Malia Rich on 6/29/2020 at 2:26 PM

## 2020-06-30 LAB
SARS-COV-2 RNA SPEC QL NAA+PROBE: NOT DETECTED
SPECIMEN SOURCE: NORMAL

## 2020-06-30 RX ORDER — SCOLOPAMINE TRANSDERMAL SYSTEM 1 MG/1
1 PATCH, EXTENDED RELEASE TRANSDERMAL
Qty: 4 PATCH | Refills: 3 | Status: SHIPPED | OUTPATIENT
Start: 2020-06-30 | End: 2021-08-30

## 2020-06-30 NOTE — TELEPHONE ENCOUNTER
Notified patient that rx was sent in.  Etta Del Angel LPN ....................  6/30/2020   10:00 AM

## 2020-07-23 ENCOUNTER — OFFICE VISIT (OUTPATIENT)
Dept: UROLOGY | Facility: OTHER | Age: 65
End: 2020-07-23
Attending: UROLOGY
Payer: MEDICARE

## 2020-07-23 VITALS
DIASTOLIC BLOOD PRESSURE: 80 MMHG | OXYGEN SATURATION: 98 % | RESPIRATION RATE: 16 BRPM | BODY MASS INDEX: 25.05 KG/M2 | WEIGHT: 156.4 LBS | HEART RATE: 67 BPM | SYSTOLIC BLOOD PRESSURE: 124 MMHG

## 2020-07-23 DIAGNOSIS — R39.12 WEAK URINARY STREAM: ICD-10-CM

## 2020-07-23 DIAGNOSIS — E29.1 HYPOGONADISM IN MALE: Primary | ICD-10-CM

## 2020-07-23 PROCEDURE — 99213 OFFICE O/P EST LOW 20 MIN: CPT | Performed by: UROLOGY

## 2020-07-23 PROCEDURE — G0463 HOSPITAL OUTPT CLINIC VISIT: HCPCS | Mod: 25

## 2020-07-23 PROCEDURE — 25000128 H RX IP 250 OP 636: Performed by: UROLOGY

## 2020-07-23 PROCEDURE — 96372 THER/PROPH/DIAG INJ SC/IM: CPT

## 2020-07-23 PROCEDURE — G0463 HOSPITAL OUTPT CLINIC VISIT: HCPCS

## 2020-07-23 RX ADMIN — TESTOSTERONE UNDECANOATE 750 MG: 250 INJECTION INTRAMUSCULAR at 09:19

## 2020-07-23 ASSESSMENT — PAIN SCALES - GENERAL: PAINLEVEL: MODERATE PAIN (4)

## 2020-07-23 NOTE — NURSING NOTE
"Chief Complaint   Patient presents with     Procedure     Aveed injection    Patient presents to the clinic today for a procedure of an aveed injection.    Review of Systems:    Weight loss:    No     Recent fever/chills:  No   Night sweats:   No  Current skin rash:  No   Recent hair loss:  No  Heat intolerance:  No   Cold intolerance:  No  Chest pain:   No   Palpitations:   No  Shortness of breath:  No   Wheezing:   No  Constipation:    No   Diarrhea:   No   Nausea:   No   Vomiting:   No   Kidney/side pain:  No   Back pain:   Yes  Frequent headaches:  Yes   Dizziness:     No  Leg swelling:   No   Calf pain:    No    Review of Systems:    Patient given \"Aveed Treatment: A Patient Guide\" form.  Injection given and patient in clinic for 30 minutes after injection for monitoring.  Patient tolerated injection with no problems.  Shelby Francis LPN  7/23/2020 8:29 AM             Medication Reconciliation: completed   Shelby Francis LPN  7/23/2020 8:27 AM   "

## 2020-07-23 NOTE — PROGRESS NOTES
"Type of Visit  EST    Chief Complaint  Hypogonadism    HPI  Mr. Freedman is a 65 y.o. male who follows up with symptomatic hypogonadism.  Primary symptoms include low energy and poor sleep.  He has been on testosterone replacement for over 6 years.  He previously failed patches and short acting injections.  Recently returned from a trip to [place].    He continues to do well with AVEED.  He continues to donate blood on a regular basis to maintain a normal hematocrit level.  He underwent labs last in December and is due for the next injection.  He follows up today for dose of AVEED.  He continues to report no side effects and is satisfied with the treatment.      Review of Systems  I reviewed the ROS with the patient today.    Nursing Notes:   Shelby Francis LPN  7/23/2020  8:45 AM  Signed  Chief Complaint   Patient presents with     Procedure     Aveed injection    Patient presents to the clinic today for a procedure of an aveed injection.    Review of Systems:    Weight loss:    No     Recent fever/chills:  No   Night sweats:   No  Current skin rash:  No   Recent hair loss:  No  Heat intolerance:  No   Cold intolerance:  No  Chest pain:   No   Palpitations:   No  Shortness of breath:  No   Wheezing:   No  Constipation:    No   Diarrhea:   No   Nausea:   No   Vomiting:   No   Kidney/side pain:  No   Back pain:   Yes  Frequent headaches:  Yes   Dizziness:     No  Leg swelling:   No   Calf pain:    No    Review of Systems:    Patient given \"Aveed Treatment: A Patient Guide\" form.  Injection given and patient in clinic for 30 minutes after injection for monitoring.  Patient tolerated injection with no problems.  Shelby Francis LPN  7/23/2020 8:29 AM             Medication Reconciliation: completed   Shelby Francis LPN  7/23/2020 8:27 AM        Physical Exam  /80 (BP Location: Left arm, Patient Position: Sitting, Cuff Size: Adult Regular)   Pulse 67   Resp 16   Wt 70.9 kg (156 lb 6.4 oz)   SpO2 98%   BMI 25.05 " kg/m    Constitutional: No acute distress.  Alert and cooperative   Head: NCAT  Eyes: Conjunctivae normal  Cardiovascular: Regular rate.  Pulmonary/Chest: Respirations are even and non-labored bilaterally, no audible wheezing  Abdominal: Soft. No distension, tenderness, masses or guarding.   Extremities: GILDARDO x 4, Warm. No clubbing.  No cyanosis.    Skin: Pink, warm and dry.  No visible rashes noted.  Back:  No left CVA tenderness.  No right CVA tenderness.  Genitourinary:  Nonpalpable bladder    Labs  Results for TYRON JONES (MRN [id number]) as of 12/30/2019 09:17   12/16/2019 08:47   Testosterone Total 323   Hematocrit 52.1     Results fr TYRON JONES (MRN [id number]) as of 5/30/2019 08:17   5/16/2019 08:48   Prostate Specific Antigen 0.885   Testosterone Total 338   Hematocrit 54.8 (H)     Results for TEJINDER JONES (MRN [id number]) as of 9/22/2016 09:15   3/7/2016 09:16   TESTOSTERONE,TOTAL 104.4 (L)     Testosterone Injection  Today the patient received an injection of testosterone undecanoate 750mg.  This was given in the gluteus.  The results of this injection: None  Patient was monitored for 30 minutes following the injection.    Assessment  Hypogonadism- continue Aveed q10 weeks  Labs reviewed and WNL.    He does have a history of elevated hematocrit -he donates blood regularly    Plan  Labs in 10 weeks (hematocrit, T) -same day as next treatment  Aveed 750mg IM every 10 weeks

## 2020-08-15 ENCOUNTER — OFFICE VISIT (OUTPATIENT)
Dept: FAMILY MEDICINE | Facility: OTHER | Age: 65
End: 2020-08-15
Attending: NURSE PRACTITIONER
Payer: COMMERCIAL

## 2020-08-15 VITALS
HEART RATE: 72 BPM | HEIGHT: 66 IN | OXYGEN SATURATION: 98 % | WEIGHT: 156.3 LBS | RESPIRATION RATE: 16 BRPM | DIASTOLIC BLOOD PRESSURE: 84 MMHG | SYSTOLIC BLOOD PRESSURE: 136 MMHG | TEMPERATURE: 97.8 F | BODY MASS INDEX: 25.12 KG/M2

## 2020-08-15 DIAGNOSIS — L23.7 CONTACT DERMATITIS DUE TO POISON IVY: Primary | ICD-10-CM

## 2020-08-15 PROCEDURE — G0463 HOSPITAL OUTPT CLINIC VISIT: HCPCS

## 2020-08-15 PROCEDURE — 99213 OFFICE O/P EST LOW 20 MIN: CPT | Performed by: NURSE PRACTITIONER

## 2020-08-15 RX ORDER — PREDNISONE 20 MG/1
40 TABLET ORAL DAILY
Qty: 10 TABLET | Refills: 0 | Status: SHIPPED | OUTPATIENT
Start: 2020-08-15 | End: 2020-08-20

## 2020-08-15 RX ORDER — TRIAMCINOLONE ACETONIDE 0.25 MG/G
OINTMENT TOPICAL 2 TIMES DAILY
Qty: 80 G | Refills: 1 | Status: SHIPPED | OUTPATIENT
Start: 2020-08-15 | End: 2020-08-29

## 2020-08-15 ASSESSMENT — PAIN SCALES - GENERAL: PAINLEVEL: NO PAIN (0)

## 2020-08-15 ASSESSMENT — MIFFLIN-ST. JEOR: SCORE: 1430.69

## 2020-08-15 NOTE — NURSING NOTE
Patient presents today for rash x3 days. Patient reports that he was in the woods, but doesn't recall seeing poison ivy or poison oak. His rash started on his side and has now [place].     Medication Reconciliation Complete    Malia Sahu LPN  8/15/2020 10:10 AM

## 2020-08-15 NOTE — PROGRESS NOTES
Nursing Notes:   Malia Sahu LPN  8/15/2020 10:13 AM  Sign at exiting of workspace  Patient presents today for rash x3 days. Patient reports that he was in the woods, but doesn't recall seeing poison ivy or poison oak. His rash started on his side and has now traveled to [place].     Medication Reconciliation Complete    Malia Sahu LPN  8/15/2020 10:10 AM    SUBJECTIVE:   Km Freedman is a 65 year old male who presents to clinic today for the following health issues:    Patient presents to the clinic for a rash.  He is noted this rash for the last 3 to 4 days.  He seems to feel that he has quit spreading.  He is wondering if it is poison ivy since he was outside prior to the rash onset.  He is tried calamine lotion and hydrocortisone cream.  He feels that this has not provided any relief.  He is wondering about current recommendations and treatments.  Denies fevers, chills, cough, shortness of breath or nasal congestion.      Problem list and histories reviewed & adjusted, as indicated.  Additional history: as documented    Patient Active Problem List   Diagnosis     Arthropathy     Bilateral calf pain     Chondromalacia patellae     Depression, major, recurrent, mild (H)     Degeneration of lumbar or lumbosacral intervertebral disc     Elevated CPK     Failed back syndrome of lumbar spine     Esophageal reflux     Essential hypertension     Hypercholesterolemia     Hypogonadism in male     Lateral epicondylitis of right elbow     Leg cramps     Medication management     Muscle pain     Neck pain, chronic     Pain medication agreement     Restless leg syndrome     Enthesopathy of hip region     Other specified hypothyroidism     Past Surgical History:   Procedure Laterality Date     FUSION LUMBAR ANTERIOR ONE LEVEL  1999    interbody fusion L4-5     FUSION LUMBAR ANTERIOR ONE LEVEL  01/24/2001    L5-S1 discectomy with anterior interbody fusion L4-5     IR RHIZOTOMY  2001    Has had 2  "rhizomoties     IR RHIZOTOMY Left 2000    medial branch     LAMINECT/DISCECTOMY, LUMBAR      NEURO,LUMBAR DISKECTOMY, L5-S1       Social History     Tobacco Use     Smoking status: Never Smoker     Smokeless tobacco: Never Used   Substance Use Topics     Alcohol use: No     Family History   Problem Relation Age of Onset     Other - See Comments Father          after a bulldozer tipped over on him causing head injuries     Hypertension Father         Hypertension     Hypertension Mother         Hypertension     Family History Negative Sister         Good Health         Current Outpatient Medications   Medication Sig Dispense Refill     levothyroxine (SYNTHROID/LEVOTHROID) 50 MCG tablet Take 1 tablet (50 mcg) by mouth daily 90 tablet 3     lisinopril-hydrochlorothiazide (PRINZIDE/ZESTORETIC) 10-12.5 MG tablet Take 1 tablet by mouth daily 90 tablet 3     medical cannabis (Patient's own supply.  Not a prescription) 1 Dose See Admin Instructions (This is NOT a prescription, and does not certify that the patient has a qualifying medical condition for medical cannabis.  The purpose of this order is  to document that the patient reports taking medical cannabis.)       predniSONE (DELTASONE) 20 MG tablet Take 2 tablets (40 mg) by mouth daily for 5 days 10 tablet 0     scopolamine (TRANSDERM) 1 MG/3DAYS 72 hr patch Place 1 patch onto the skin every 72 hours 4 patch 3     sertraline (ZOLOFT) 50 MG tablet TAKE 1 TABLET (50 MG) BY MOUTH DAILY 90 tablet 3     traMADol (ULTRAM) 50 MG tablet Take 1 tablet (50 mg) by mouth every 6 hours as needed for severe pain 120 tablet 5     triamcinolone (KENALOG) 0.025 % external ointment Apply topically 2 times daily for 14 days 80 g 1     triamcinolone (KENALOG) 0.1 % cream        No Known Allergies      ROS:  Notable findings in the HPI.       OBJECTIVE:     /84   Pulse 72   Temp 97.8  F (36.6  C)   Resp 16   Ht 1.683 m (5' 6.25\")   Wt 70.9 kg (156 lb 4.8 oz)   SpO2 98%  "  BMI 25.04 kg/m    Body mass index is 25.04 kg/m .  GENERAL: healthy, alert and no distress  EYES: Eyes grossly normal to inspection  RESP: Without increased work of breathing  CV: regular rates and rhythm and peripheral pulses strong  SKIN: Examination of the rash reveals: Poison Ivy: Several clusters of well-defined erythematous vesicles with clear drainage.  On his left lateral side, bilateral groin area, pubic bone area and on the shaft of his penis.  NEURO: Normal strength and tone, mentation intact and speech normal  PSYCH: mentation appears normal, affect normal/bright    Diagnostic Test Results:  none     ASSESSMENT/PLAN:     1. Contact dermatitis due to poison ivy  - predniSONE (DELTASONE) 20 MG tablet; Take 2 tablets (40 mg) by mouth daily for 5 days  Dispense: 10 tablet; Refill: 0  - triamcinolone (KENALOG) 0.025 % external ointment; Apply topically 2 times daily for 14 days  Dispense: 80 g; Refill: 1      PLAN:    Discussed current recommendations and guidelines including getting on steroids within 24 hours of the rash onset.  After this steroids are not as useful.  Also recommend over-the-counter antihistamine such as Zyrtec.  Since he has the rash on the genital dermatology would still recommend trying a steroid.  So one is provided.  Did discuss with the patient that regardless of treatment he could still have the rash for the next 2 to 3 weeks.  Discussed home hygiene and skin care.    Followup:    If not improving or if condition worsens, follow up with your Primary Care Provider    I explained my diagnostic considerations and recommendations to the patient, who voiced understanding and agreement with the treatment plan. All questions were answered. We discussed potential side effects of any prescribed or recommended therapies, as well as expectations for response to treatments. He was advised to contact our office if there is no improvement or worsening of conditions or symptoms.  If s/s worsen or  persist, patient will either come back or follow up with PCP.    Disclaimer:  This note consists of words and symbols derived from keyboarding, dictation, or using voice recognition software. As a result, there may be errors in the script that have gone undetected. Please consider this when interpreting information found in this note.      Tana Alvarez NP, 8/15/2020 10:18 AM

## 2020-08-15 NOTE — PATIENT INSTRUCTIONS
"Patient Education     Contact Dermatitis  Contact dermatitis is a skin rash caused by something that touches the skin and makes it irritated and inflamed. Your skin may be red, swollen, dry, and may be cracked. Blisters may form and ooze. The rash will itch.  Contact dermatitis can form on the face and neck, backs of hands, forearms, genitals, and lower legs.  People can get contact dermatitis from lots of sources. These include:    Plants such as poison ivy, oak, or sumac    Chemicals in hair dyes and rinses, soaps, solvents, waxes, fingernail polish, and deodorants     Jewelry or watchbands made of nickel  Contact dermatitis is not passed from person to person.  Talk with your healthcare provider about what may have caused the rash. A type of allergy testing called \"patch testing\" may be used to discover what you are allergic to. You will need to avoid the source of your rash in the future to prevent it from coming back.  Treatment is done to relieve itching and prevent the rash from coming back. The rash should go away in a few days to a few weeks.  Home care  Your healthcare provider may prescribe medicine to relieve swelling and itching. Follow all instructions when using these medicines.  General care:    Avoid anything that heats up your skin, such as hot showers or baths, or direct sunlight. This can make itching worse.    Apply cold compresses to soothe your sores to help relieve your symptoms. Do this for 30 minutes 3 to 4 times a day. You can make a cold compress by soaking a cloth in cold water. Squeeze out excess water. You can add colloidal oatmeal to the water to help reduce itching. For severe itching in a small area, apply an ice pack wrapped in a thin towel. Do this for 20 minutes 3 to 4 times a day.    You can also try wet dressings. One way to do this is to wear a wet piece of clothing under a dry one. Wear a damp shirt under a dry shirt if your upper body is affected. This can relieve itching " and prevent you from scratching the affected area.    You can also help relieve large areas of itching by taking a lukewarm bath with colloidal oatmeal added to the water.    Use hydrocortisone cream for redness and irritation, unless another medicine was prescribed. You can also use benzocaine anesthetic cream or spray. Calamine lotion can also relieve mild symptoms.    Use oral diphenhydramine to help reduce itching. You can buy this antihistamine at drug and grocery stores. It can make you sleepy, so use lower doses during the daytime. Or you can use loratadine. This is an antihistamine that will not make you sleepy. Do not use diphenhydramine if you have glaucoma or have trouble urinating due to an enlarged prostate.    If a plant causes your rash, make sure to wash your skin and the clothes you were wearing when you came into contact with the plant. This is to wash away the plant oils that gave you the rash and prevent more or worse symptoms.    Stay away from the substance or object that causes your symptoms. If you can t avoid it, wear gloves or some other type of protection.  Follow-up care  Follow up with your healthcare provider, or as advised.  When to seek medical advice  Call your healthcare provider right away if any of these occur:    Spreading of the rash to other parts of your body    Severe swelling of your face, eyelids, mouth, throat or tongue    Trouble urinating due to swelling in the genital area    Fever of 100.4 F (38 C) or higher    Redness or swelling that gets worse    Pain that gets worse    Foul-smelling fluid leaking from the skin    Yellow-brown crusts on the open blisters  Date Last Reviewed: 9/1/2016 2000-2019 [employer]. [address] [place] [address], [place] [address]. All rights reserved. This information is not intended as a substitute for professional medical care. Always follow your healthcare professional's instructions.

## 2020-09-29 DIAGNOSIS — F33.0 DEPRESSION, MAJOR, RECURRENT, MILD (H): ICD-10-CM

## 2020-09-29 DIAGNOSIS — I10 ESSENTIAL HYPERTENSION: ICD-10-CM

## 2020-09-29 RX ORDER — LISINOPRIL/HYDROCHLOROTHIAZIDE 10-12.5 MG
TABLET ORAL
Qty: 90 TABLET | Refills: 0 | Status: SHIPPED | OUTPATIENT
Start: 2020-09-29 | End: 2021-01-06

## 2020-09-29 NOTE — LETTER
September 29, 2020      Km Freedman  [address]  [place] [address]        Dear Km,     A refill of lisinopril-hydrochlorothiazide and sertraline have been requested by your pharmacy.  We noticed that you will be due for a comprehensive visit and labs with Dr. Flor in October.  A limited 90 day supply has been sent to your pharmacy at this time.    Additional refills require a medication management appointment.  Your health is very important to us.  Please call the clinic at [phone] to schedule your appointment.    Thank you,    The Refill Nurse  [facility]

## 2020-09-29 NOTE — TELEPHONE ENCOUNTER
Pt out of [facility] and Pt sent Rx request for the following:   lisinopril-hydrochlorothiazide (ZESTORETIC) 10-12.5 MG tablet  Sig:  Take 1 tablet by mouth once daily    Last Prescription Date:   8/13/2019  Last Fill Qty/Refills:         90, R-3    Last Office Visit:              6/17/2020   Future Office visit:           None    sertraline (ZOLOFT) 50 MG tablet  Sig:  Take 1 tablet by mouth once daily    Last Prescription Date:   8/14/2019  Last Fill Qty/Refills:         90, R-3    Last Office Visit:              6/17/2020   Future Office visit:           None  Overridden by Andres Flor MD on Jun 17, 2020 10:34 AM    Drug-Drug    1. TRAMADOL / SELECTIVE SEROTONIN REUPTAKE INHIBITORS [Level: Major] [Reason: Benefit outweighs risk]    Other Orders:  traMADol (ULTRAM) 50 MG tablet          Overridden by Andres Flor MD on Mar 17, 2020 9:36 AM    Drug-Drug    1. TRAMADOL / SELECTIVE SEROTONIN REUPTAKE INHIBITORS [Level: Major] [Reason: Benefit outweighs risk]    Other Orders:  traMADol (ULTRAM) 50 MG tablet          Overridden by Andres Flor MD on Jan 14, 2020 3:31 PM    Drug-Drug    1. TRAMADOL / SELECTIVE SEROTONIN REUPTAKE INHIBITORS [Level: Major] [Reason: Benefit outweighs risk]    Other Orders:  traMADol (ULTRAM) 50 MG tablet           Pt is soon due for medication management appt.  Will send appt reminder letter, add note to Rx, and fill limited supply    Chuyita Martinez RN  ....................  9/29/2020   1:03 PM

## 2020-10-01 ENCOUNTER — OFFICE VISIT (OUTPATIENT)
Dept: UROLOGY | Facility: OTHER | Age: 65
End: 2020-10-01
Attending: UROLOGY
Payer: MEDICARE

## 2020-10-01 VITALS
HEART RATE: 66 BPM | OXYGEN SATURATION: 99 % | DIASTOLIC BLOOD PRESSURE: 62 MMHG | BODY MASS INDEX: 24.89 KG/M2 | RESPIRATION RATE: 16 BRPM | SYSTOLIC BLOOD PRESSURE: 112 MMHG | WEIGHT: 155.4 LBS

## 2020-10-01 DIAGNOSIS — R39.12 WEAK URINARY STREAM: ICD-10-CM

## 2020-10-01 DIAGNOSIS — E29.1 HYPOGONADISM IN MALE: Primary | ICD-10-CM

## 2020-10-01 DIAGNOSIS — E29.1 HYPOGONADISM IN MALE: ICD-10-CM

## 2020-10-01 LAB
HCT VFR BLD AUTO: 52.1 % (ref 40–53)
PSA SERPL-MCNC: 1.21 NG/ML
TESTOST SERPL-MCNC: 263 NG/DL (ref 175–781)

## 2020-10-01 PROCEDURE — 99213 OFFICE O/P EST LOW 20 MIN: CPT | Performed by: UROLOGY

## 2020-10-01 PROCEDURE — 85014 HEMATOCRIT: CPT | Mod: ZL | Performed by: UROLOGY

## 2020-10-01 PROCEDURE — 250N000011 HC RX IP 250 OP 636: Performed by: UROLOGY

## 2020-10-01 PROCEDURE — 84403 ASSAY OF TOTAL TESTOSTERONE: CPT | Mod: ZL | Performed by: UROLOGY

## 2020-10-01 PROCEDURE — 96372 THER/PROPH/DIAG INJ SC/IM: CPT | Performed by: UROLOGY

## 2020-10-01 PROCEDURE — 36415 COLL VENOUS BLD VENIPUNCTURE: CPT | Mod: ZL | Performed by: UROLOGY

## 2020-10-01 PROCEDURE — G0463 HOSPITAL OUTPT CLINIC VISIT: HCPCS

## 2020-10-01 PROCEDURE — 84153 ASSAY OF PSA TOTAL: CPT | Mod: ZL | Performed by: UROLOGY

## 2020-10-01 PROCEDURE — G0463 HOSPITAL OUTPT CLINIC VISIT: HCPCS | Mod: 25

## 2020-10-01 RX ADMIN — TESTOSTERONE UNDECANOATE 750 MG: 250 INJECTION INTRAMUSCULAR at 12:54

## 2020-10-01 ASSESSMENT — PAIN SCALES - GENERAL: PAINLEVEL: MILD PAIN (3)

## 2020-10-01 NOTE — PROGRESS NOTES
"Type of Visit  EST    Chief Complaint  Hypogonadism    HPI  Mr. Freedman is a 65 y.o. male who follows up with symptomatic hypogonadism.  Primary symptoms include low energy and poor sleep.  He has been on testosterone replacement for over 6 years.  He previously failed patches and short acting injections.  Recently returned from a trip to [place].    He continues to do well with AVEED.  He continues to donate blood on a regular basis to maintain a normal hematocrit level.  He underwent labs last in December and is due for the next injection.  He follows up today for dose of AVEED.  He continues to report no side effects and is satisfied with the treatment.      Review of Systems  I reviewed the ROS with the patient today.    Nursing Notes:   Shelby Francis LPN  10/1/2020  9:56 AM  Signed  Chief Complaint   Patient presents with     Procedure     Aveed injection    Patient presents to the clinic for a 10 week Aveed injection,     Review of Systems:    Weight loss:    No     Recent fever/chills:  No   Night sweats:   No  Current skin rash:  No   Recent hair loss:  No  Heat intolerance:  No   Cold intolerance:  No  Chest pain:   No   Palpitations:   No  Shortness of breath:  No   Wheezing:   No  Constipation:    No   Diarrhea:   No   Nausea:   No   Vomiting:   No   Kidney/side pain:  No   Back pain:   Yes  Frequent headaches:  No   Dizziness:     No  Leg swelling:   No   Calf pain:    Yes    Patient given \"Aveed Treatment: A Patient Guide\" form.  Injection given and patient in clinic for 30 minutes after injection for monitoring.  Patient tolerated injection with no problems.  Shelby Francis LPN  10/1/2020 9:37 AM      Medication Reconciliation: completed   Shelby Francis LPN  10/1/2020 9:32 AM        Physical Exam  /62 (BP Location: Right arm, Patient Position: Sitting, Cuff Size: Adult Regular)   Pulse 66   Resp 16   Wt 70.5 kg (155 lb 6.4 oz)   SpO2 99%   BMI 24.89 kg/m    Constitutional: No acute distress.  " Alert and cooperative   Head: NCAT  Eyes: Conjunctivae normal  Cardiovascular: Regular rate.  Pulmonary/Chest: Respirations are even and non-labored bilaterally, no audible wheezing  Abdominal: Soft. No distension, tenderness, masses or guarding.   Extremities: GILDARDO x 4, Warm. No clubbing.  No cyanosis.    Skin: Pink, warm and dry.  No visible rashes noted.  Back:  No left CVA tenderness.  No right CVA tenderness.  Genitourinary:  Nonpalpable bladder    Labs  Results for TYRON JONES (MRN [id number]) as of 10/1/2020 12:45   10/1/2020 07:49   Prostate Specific Antigen 1.205   Testosterone Total 263   Hematocrit 52.1     Results for TYRON JONES (MRN [id number]) as of 12/30/2019 09:17   12/16/2019 08:47   Testosterone Total 323   Hematocrit 52.1     Results fr TYRON JONES (MRN [id number]) as of 5/30/2019 08:17   5/16/2019 08:48   Prostate Specific Antigen 0.885   Testosterone Total 338   Hematocrit 54.8 (H)     Results for TEJINDER JONES (MRN [id number]) as of 9/22/2016 09:15   3/7/2016 09:16   TESTOSTERONE,TOTAL 104.4 (L)     Testosterone Injection  Today the patient received an injection of testosterone undecanoate 750mg.  This was given in the gluteus.  The results of this injection: None  Patient was monitored for 30 minutes following the injection.    Assessment  Hypogonadism- continue Aveed q10 weeks  Labs reviewed and WNL.    He does have a history of elevated hematocrit -he donates blood regularly    Plan  Labs in 4 cycles (hematocrit, T) -same day as next treatment  Aveed 750mg IM every 10 weeks

## 2020-10-01 NOTE — NURSING NOTE
"Chief Complaint   Patient presents with     Procedure     Aveed injection    Patient presents to the clinic for a 10 week Aveed injection,     Review of Systems:    Weight loss:    No     Recent fever/chills:  No   Night sweats:   No  Current skin rash:  No   Recent hair loss:  No  Heat intolerance:  No   Cold intolerance:  No  Chest pain:   No   Palpitations:   No  Shortness of breath:  No   Wheezing:   No  Constipation:    No   Diarrhea:   No   Nausea:   No   Vomiting:   No   Kidney/side pain:  No   Back pain:   Yes  Frequent headaches:  No   Dizziness:     No  Leg swelling:   No   Calf pain:    Yes    Patient given \"Aveed Treatment: A Patient Guide\" form.  Injection given and patient in clinic for 30 minutes after injection for monitoring.  Patient tolerated injection with no problems.  Shelby Francis LPN  10/1/2020 9:37 AM      Medication Reconciliation: completed   Shelby Francis LPN  10/1/2020 9:32 AM   "

## 2020-10-09 ENCOUNTER — OFFICE VISIT (OUTPATIENT)
Dept: FAMILY MEDICINE | Facility: OTHER | Age: 65
End: 2020-10-09
Attending: FAMILY MEDICINE
Payer: MEDICARE

## 2020-10-09 VITALS
OXYGEN SATURATION: 97 % | WEIGHT: 154.8 LBS | DIASTOLIC BLOOD PRESSURE: 72 MMHG | TEMPERATURE: 97 F | HEART RATE: 70 BPM | RESPIRATION RATE: 16 BRPM | SYSTOLIC BLOOD PRESSURE: 128 MMHG | BODY MASS INDEX: 24.8 KG/M2

## 2020-10-09 DIAGNOSIS — E03.8 OTHER SPECIFIED HYPOTHYROIDISM: Primary | ICD-10-CM

## 2020-10-09 DIAGNOSIS — Z23 NEED FOR PROPHYLACTIC VACCINATION AND INOCULATION AGAINST INFLUENZA: ICD-10-CM

## 2020-10-09 DIAGNOSIS — I10 ESSENTIAL HYPERTENSION: ICD-10-CM

## 2020-10-09 LAB
ANION GAP SERPL CALCULATED.3IONS-SCNC: 6 MMOL/L (ref 3–14)
BASOPHILS # BLD AUTO: 0.1 10E9/L (ref 0–0.2)
BASOPHILS NFR BLD AUTO: 0.7 %
BUN SERPL-MCNC: 20 MG/DL (ref 7–25)
CALCIUM SERPL-MCNC: 9.5 MG/DL (ref 8.6–10.3)
CHLORIDE SERPL-SCNC: 99 MMOL/L (ref 98–107)
CO2 SERPL-SCNC: 32 MMOL/L (ref 21–31)
CREAT SERPL-MCNC: 1.06 MG/DL (ref 0.7–1.3)
DIFFERENTIAL METHOD BLD: NORMAL
EOSINOPHIL # BLD AUTO: 0.1 10E9/L (ref 0–0.7)
EOSINOPHIL NFR BLD AUTO: 1.6 %
ERYTHROCYTE [DISTWIDTH] IN BLOOD BY AUTOMATED COUNT: 13.5 % (ref 10–15)
GFR SERPL CREATININE-BSD FRML MDRD: 70 ML/MIN/{1.73_M2}
GLUCOSE SERPL-MCNC: 93 MG/DL (ref 70–105)
HCT VFR BLD AUTO: 51 % (ref 40–53)
HGB BLD-MCNC: 17 G/DL (ref 13.3–17.7)
IMM GRANULOCYTES # BLD: 0 10E9/L (ref 0–0.4)
IMM GRANULOCYTES NFR BLD: 0.5 %
LYMPHOCYTES # BLD AUTO: 1.5 10E9/L (ref 0.8–5.3)
LYMPHOCYTES NFR BLD AUTO: 17.4 %
MCH RBC QN AUTO: 29 PG (ref 26.5–33)
MCHC RBC AUTO-ENTMCNC: 33.3 G/DL (ref 31.5–36.5)
MCV RBC AUTO: 87 FL (ref 78–100)
MONOCYTES # BLD AUTO: 0.6 10E9/L (ref 0–1.3)
MONOCYTES NFR BLD AUTO: 7.3 %
NEUTROPHILS # BLD AUTO: 6.1 10E9/L (ref 1.6–8.3)
NEUTROPHILS NFR BLD AUTO: 72.5 %
PLATELET # BLD AUTO: 188 10E9/L (ref 150–450)
POTASSIUM SERPL-SCNC: 4.1 MMOL/L (ref 3.5–5.1)
RBC # BLD AUTO: 5.86 10E12/L (ref 4.4–5.9)
SODIUM SERPL-SCNC: 137 MMOL/L (ref 134–144)
TSH SERPL DL<=0.05 MIU/L-ACNC: 2.13 IU/ML (ref 0.34–5.6)
WBC # BLD AUTO: 8.4 10E9/L (ref 4–11)

## 2020-10-09 PROCEDURE — 80048 BASIC METABOLIC PNL TOTAL CA: CPT | Mod: ZL | Performed by: FAMILY MEDICINE

## 2020-10-09 PROCEDURE — 84443 ASSAY THYROID STIM HORMONE: CPT | Mod: ZL | Performed by: FAMILY MEDICINE

## 2020-10-09 PROCEDURE — 85025 COMPLETE CBC W/AUTO DIFF WBC: CPT | Mod: ZL | Performed by: FAMILY MEDICINE

## 2020-10-09 PROCEDURE — G0463 HOSPITAL OUTPT CLINIC VISIT: HCPCS

## 2020-10-09 PROCEDURE — 90686 IIV4 VACC NO PRSV 0.5 ML IM: CPT

## 2020-10-09 PROCEDURE — 36415 COLL VENOUS BLD VENIPUNCTURE: CPT | Mod: ZL | Performed by: FAMILY MEDICINE

## 2020-10-09 PROCEDURE — G0008 ADMIN INFLUENZA VIRUS VAC: HCPCS

## 2020-10-09 PROCEDURE — G0463 HOSPITAL OUTPT CLINIC VISIT: HCPCS | Mod: 25

## 2020-10-09 PROCEDURE — 99213 OFFICE O/P EST LOW 20 MIN: CPT | Performed by: FAMILY MEDICINE

## 2020-10-09 ASSESSMENT — PATIENT HEALTH QUESTIONNAIRE - PHQ9
SUM OF ALL RESPONSES TO PHQ QUESTIONS 1-9: 7
5. POOR APPETITE OR OVEREATING: SEVERAL DAYS

## 2020-10-09 ASSESSMENT — ANXIETY QUESTIONNAIRES
6. BECOMING EASILY ANNOYED OR IRRITABLE: SEVERAL DAYS
GAD7 TOTAL SCORE: 6
7. FEELING AFRAID AS IF SOMETHING AWFUL MIGHT HAPPEN: NOT AT ALL
1. FEELING NERVOUS, ANXIOUS, OR ON EDGE: SEVERAL DAYS
IF YOU CHECKED OFF ANY PROBLEMS ON THIS QUESTIONNAIRE, HOW DIFFICULT HAVE THESE PROBLEMS MADE IT FOR YOU TO DO YOUR WORK, TAKE CARE OF THINGS AT HOME, OR GET ALONG WITH OTHER PEOPLE: SOMEWHAT DIFFICULT
5. BEING SO RESTLESS THAT IT IS HARD TO SIT STILL: SEVERAL DAYS
2. NOT BEING ABLE TO STOP OR CONTROL WORRYING: SEVERAL DAYS
3. WORRYING TOO MUCH ABOUT DIFFERENT THINGS: SEVERAL DAYS

## 2020-10-09 ASSESSMENT — ENCOUNTER SYMPTOMS
SHORTNESS OF BREATH: 0
FEVER: 0
FATIGUE: 1

## 2020-10-09 ASSESSMENT — PAIN SCALES - GENERAL: PAINLEVEL: MODERATE PAIN (4)

## 2020-10-09 NOTE — NURSING NOTE
"Coming in for a medication and labs test    Chief Complaint   Patient presents with     Recheck Medication     and lab work       Initial /72   Pulse 70   Temp 97  F (36.1  C)   Resp 16   Wt 70.2 kg (154 lb 12.8 oz)   SpO2 97%   BMI 24.80 kg/m   Estimated body mass index is 24.8 kg/m  as calculated from the following:    Height as of 8/15/20: 1.683 m (5' 6.25\").    Weight as of this encounter: 70.2 kg (154 lb 12.8 oz).  Medication Reconciliation: complete    Kisha Valdovinos LPN  "

## 2020-10-10 ASSESSMENT — ANXIETY QUESTIONNAIRES: GAD7 TOTAL SCORE: 6

## 2020-10-11 ENCOUNTER — MYC MEDICAL ADVICE (OUTPATIENT)
Dept: FAMILY MEDICINE | Facility: OTHER | Age: 65
End: 2020-10-11

## 2020-10-12 ENCOUNTER — MYC MEDICAL ADVICE (OUTPATIENT)
Dept: FAMILY MEDICINE | Facility: OTHER | Age: 65
End: 2020-10-12
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
## 2020-10-12 RX ORDER — DOXYCYCLINE 100 MG/1
CAPSULE ORAL
Qty: 2 CAPSULE | Refills: 0 | Status: SHIPPED | OUTPATIENT
Start: 2020-10-12 | End: 2021-06-29

## 2020-12-10 ENCOUNTER — OFFICE VISIT (OUTPATIENT)
Dept: UROLOGY | Facility: OTHER | Age: 65
End: 2020-12-10
Attending: UROLOGY
Payer: MEDICARE

## 2020-12-10 VITALS — SYSTOLIC BLOOD PRESSURE: 122 MMHG | RESPIRATION RATE: 16 BRPM | HEART RATE: 54 BPM | DIASTOLIC BLOOD PRESSURE: 76 MMHG

## 2020-12-10 DIAGNOSIS — E29.1 HYPOGONADISM IN MALE: Primary | ICD-10-CM

## 2020-12-10 PROCEDURE — 250N000011 HC RX IP 250 OP 636: Performed by: UROLOGY

## 2020-12-10 PROCEDURE — 96372 THER/PROPH/DIAG INJ SC/IM: CPT | Performed by: UROLOGY

## 2020-12-10 PROCEDURE — G0463 HOSPITAL OUTPT CLINIC VISIT: HCPCS | Mod: 25

## 2020-12-10 PROCEDURE — 99213 OFFICE O/P EST LOW 20 MIN: CPT | Performed by: UROLOGY

## 2020-12-10 RX ADMIN — TESTOSTERONE UNDECANOATE 750 MG: 250 INJECTION INTRAMUSCULAR at 09:23

## 2020-12-10 ASSESSMENT — PAIN SCALES - GENERAL: PAINLEVEL: MODERATE PAIN (4)

## 2020-12-10 NOTE — NURSING NOTE
"Pt presents to clinic for Aveed injection    Review of Systems:    Weight loss:    No     Recent fever/chills:  No   Night sweats:   No  Current skin rash:  No   Recent hair loss:  No  Heat intolerance:  No   Cold intolerance:  No  Chest pain:   No   Palpitations:   No  Shortness of breath:  No   Wheezing:   No  Constipation:    No   Diarrhea:   No   Nausea:   No   Vomiting:   No   Kidney/side pain:  No   Back pain:   Yes  Frequent headaches:  No   Dizziness:     No  Leg swelling:   No   Calf pain:    Yes    Patient given \"Aveed Treatment: A Patient Guide\" form.  Injection given and patient in clinic for 30 minutes after injection for monitoring.  Patient tolerated injection with no problems.  Batsheva Sheriff LPN .........12/10/2020...9:45 AM      "

## 2020-12-10 NOTE — PROGRESS NOTES
"Type of Visit  EST    Chief Complaint  Hypogonadism    HPI  Mr. Freedman is a 65 y.o. male who follows up with symptomatic hypogonadism.  Primary symptoms include low energy and poor sleep.  He has been on testosterone replacement for over 6 years.  He previously failed patches and short acting injections.    He continues to do well with AVEED and follows up today for a dose.  He does continue to donate blood when available.  He denies side effect such as mood swings or acne  He continues to be satisfied with the improvement in energy and focus.  No complaints.      Review of Systems  I reviewed the ROS with the patient today.    Nursing Notes:   Batsheva Sheriff LPN  12/10/2020  9:45 AM  Sign at exiting of workspace  Pt presents to clinic for Aveed injection    Review of Systems:    Weight loss:    No     Recent fever/chills:  No   Night sweats:   No  Current skin rash:  No   Recent hair loss:  No  Heat intolerance:  No   Cold intolerance:  No  Chest pain:   No   Palpitations:   No  Shortness of breath:  No   Wheezing:   No  Constipation:    No   Diarrhea:   No   Nausea:   No   Vomiting:   No   Kidney/side pain:  No   Back pain:   Yes  Frequent headaches:  No   Dizziness:     No  Leg swelling:   No   Calf pain:    Yes    Patient given \"Aveed Treatment: A Patient Guide\" form.  Injection given and patient in clinic for 30 minutes after injection for monitoring.  Patient tolerated injection with no problems.  Batsheva Sheriff LPN .........12/10/2020...9:45 AM           Physical Exam  /76 (BP Location: Left arm, Patient Position: Sitting, Cuff Size: Adult Regular)   Pulse 54   Resp 16   Constitutional: No acute distress.  Alert and cooperative   Head: NCAT  Eyes: Conjunctivae normal  Cardiovascular: Regular rate.  Pulmonary/Chest: Respirations are even and non-labored bilaterally, no audible wheezing  Abdominal: Soft. No distension, tenderness, masses or guarding.   Extremities: GILDARDO x 4, Warm. No clubbing.  " No cyanosis.    Skin: Pink, warm and dry.  No visible rashes noted.  Back:  No left CVA tenderness.  No right CVA tenderness.  Genitourinary:  Nonpalpable bladder    Labs  Results for TYRON JONES (MRN [id number]) as of 10/1/2020 12:45   10/1/2020 07:49   Prostate Specific Antigen 1.205   Testosterone Total 263   Hematocrit 52.1     Results for TYRON JONES (MRN [id number]) as of 12/30/2019 09:17   12/16/2019 08:47   Testosterone Total 323   Hematocrit 52.1     Results fr TYRON JONES (MRN [id number]) as of 5/30/2019 08:17   5/16/2019 08:48   Prostate Specific Antigen 0.885   Testosterone Total 338   Hematocrit 54.8 (H)     Results for TEJINDER JONES (MRN [id number]) as of 9/22/2016 09:15   3/7/2016 09:16   TESTOSTERONE,TOTAL 104.4 (L)     Testosterone Injection  Today the patient received an injection of testosterone undecanoate 750mg.  This was given in the gluteus.  The results of this injection: None  Patient was monitored for 30 minutes following the injection.    Assessment  Hypogonadism- continue Aveed q10 weeks  Labs reviewed and WNL.    He does have a history of elevated hematocrit -he donates blood regularly    Plan  Labs in 3 cycles (hematocrit, T) -same day as next treatment  Aveed 750mg IM every 10 weeks

## 2020-12-27 ENCOUNTER — HEALTH MAINTENANCE LETTER (OUTPATIENT)
Age: 65
End: 2020-12-27

## 2021-01-05 DIAGNOSIS — F33.0 DEPRESSION, MAJOR, RECURRENT, MILD (H): ICD-10-CM

## 2021-01-05 DIAGNOSIS — I10 ESSENTIAL HYPERTENSION: ICD-10-CM

## 2021-01-06 RX ORDER — LISINOPRIL/HYDROCHLOROTHIAZIDE 10-12.5 MG
TABLET ORAL
Qty: 90 TABLET | Refills: 2 | Status: SHIPPED | OUTPATIENT
Start: 2021-01-06 | End: 2021-10-22

## 2021-01-06 NOTE — TELEPHONE ENCOUNTER
[facility] sent Rx request for the following:   lisinopril-hydrochlorothiazide (ZESTORETIC) 10-12.5 MG tablet  Sig:Take 1 tablet by mouth once daily    Last Prescription Date:   09/29/2020  Last Fill Qty/Refills:         90, R-0    Diuretics (Including Combos) Protocol Passed - 1/5/2021 11:02 AM    sertraline (ZOLOFT) 50 MG tablet  Sig:Take 1 tablet by mouth once daily    Last Prescription Date:   09/29/2020  Last Fill Qty/Refills:         90, R-0    SSRIs Protocol Failed - 1/5/2021 11:02 AM       Failed - PHQ-9 score less than 5 in past 6 months    Please review last PHQ-9 score.      10/09/2020 result of 7    Last Office Visit:              10/09/2020 ([facility])   Future Office visit:           None noted    Prescription approved per [facility] Refill Protocol.  Arina Hill RN ....................  1/6/2021   11:09 AM

## 2021-01-12 DIAGNOSIS — M96.1 FAILED BACK SYNDROME OF LUMBAR SPINE: ICD-10-CM

## 2021-01-12 DIAGNOSIS — M51.379 DEGENERATION OF LUMBAR OR LUMBOSACRAL INTERVERTEBRAL DISC: ICD-10-CM

## 2021-01-12 RX ORDER — TRAMADOL HYDROCHLORIDE 50 MG/1
TABLET ORAL
Qty: 120 TABLET | Refills: 0 | OUTPATIENT
Start: 2021-01-12

## 2021-01-12 NOTE — TELEPHONE ENCOUNTER
[facility] sent Rx request for the following:   traMADol (ULTRAM) 50 MG tablet  Sig: TAKE 1 TABLET BY MOUTH EVERY 6 HOURS AS NEEDED FOR SEVERE PAIN    Last Prescription Date:   06/17/2020  Last Fill Qty/Refills:         120, R-5    Last Office Visit:              10/09/2020 ([facility])   Future Office visit:           None noted    Routing refill request to provider for review/approval because:  Drug not on the [facility] or [facility] refill protocol or controlled substance    Patient was to return for follow up 01/09/2021. No appointment on file. Will deny request with notation made to pharmacy. Unable to complete prescription refill per RN Medication Refill Policy.................... Arina Hill RN ....................  1/12/2021   11:37 AM

## 2021-01-13 ENCOUNTER — TELEPHONE (OUTPATIENT)
Dept: FAMILY MEDICINE | Facility: OTHER | Age: 66
End: 2021-01-13

## 2021-01-13 NOTE — TELEPHONE ENCOUNTER
Reason for call: Patient wanting a work in appointment.    Patient is having the following symptoms:  Med Check for 1 day    The patient is requesting an appointment with  TJP    Was an appointment offered for this call? Yes    If Yes, what is the date of the appointment?  Wanted appointment sooner than Monday because pt will be out of Tramadol     Preferred method for responding to this message: Telephone Call    Phone number patient can be reached at? Home number on file [phone] (home)    If we can't reach you directly, may we leave a detailed response at the number you provided? Yes    Can this message wait until your PCP/provider returns if unavailable today? Yes

## 2021-01-15 ENCOUNTER — VIRTUAL VISIT (OUTPATIENT)
Dept: FAMILY MEDICINE | Facility: OTHER | Age: 66
End: 2021-01-15
Attending: FAMILY MEDICINE
Payer: COMMERCIAL

## 2021-01-15 DIAGNOSIS — M96.1 FAILED BACK SYNDROME OF LUMBAR SPINE: ICD-10-CM

## 2021-01-15 DIAGNOSIS — M51.379 DEGENERATION OF LUMBAR OR LUMBOSACRAL INTERVERTEBRAL DISC: Primary | ICD-10-CM

## 2021-01-15 PROCEDURE — 99212 OFFICE O/P EST SF 10 MIN: CPT | Mod: 95 | Performed by: FAMILY MEDICINE

## 2021-01-15 RX ORDER — TRAMADOL HYDROCHLORIDE 50 MG/1
50 TABLET ORAL EVERY 6 HOURS PRN
Qty: 120 TABLET | Refills: 5 | Status: SHIPPED | OUTPATIENT
Start: 2021-01-15 | End: 2021-06-29

## 2021-01-15 ASSESSMENT — ANXIETY QUESTIONNAIRES
1. FEELING NERVOUS, ANXIOUS, OR ON EDGE: NOT AT ALL
6. BECOMING EASILY ANNOYED OR IRRITABLE: NOT AT ALL
GAD7 TOTAL SCORE: 0
5. BEING SO RESTLESS THAT IT IS HARD TO SIT STILL: NOT AT ALL
7. FEELING AFRAID AS IF SOMETHING AWFUL MIGHT HAPPEN: NOT AT ALL
IF YOU CHECKED OFF ANY PROBLEMS ON THIS QUESTIONNAIRE, HOW DIFFICULT HAVE THESE PROBLEMS MADE IT FOR YOU TO DO YOUR WORK, TAKE CARE OF THINGS AT HOME, OR GET ALONG WITH OTHER PEOPLE: NOT DIFFICULT AT ALL
2. NOT BEING ABLE TO STOP OR CONTROL WORRYING: NOT AT ALL
3. WORRYING TOO MUCH ABOUT DIFFERENT THINGS: NOT AT ALL

## 2021-01-15 ASSESSMENT — PATIENT HEALTH QUESTIONNAIRE - PHQ9: 5. POOR APPETITE OR OVEREATING: NOT AT ALL

## 2021-01-15 NOTE — NURSING NOTE
Calling in regards to getting his medication refilled    Kisha Valdovinos LPN on 1/15/2021 at 1:08 PM

## 2021-01-15 NOTE — PROGRESS NOTES
"Solo is a 65 year old who is being evaluated via a billable telephone visit.      What phone number would you like to be contacted at? [phone]  How would you like to obtain your AVS? Mail a copy  Assessment & Plan     Degeneration of lumbar or lumbosacral intervertebral disc  This is stable, and the lower extremity symptoms are improved with the light therapy.  It seems to be a harmless intervention, given my brief research online.  Refilled the tramadol for 6 months  - traMADol (ULTRAM) 50 MG tablet; Take 1 tablet (50 mg) by mouth every 6 hours as needed for severe pain    Failed back syndrome of lumbar spine     - traMADol (ULTRAM) 50 MG tablet; Take 1 tablet (50 mg) by mouth every 6 hours as needed for severe pain                       BMI:   Estimated body mass index is 24.8 kg/m  as calculated from the following:    Height as of 8/15/20: 1.683 m (5' 6.25\").    Weight as of 10/9/20: 70.2 kg (154 lb 12.8 oz).             No follow-ups on file.    Andres Flor MD  [facility]    Subjective     Solo is a 65 year old who presents to clinic today for the following health issues     HPI   Follow up on tramadol.  He was on methadone and is off this now.  Getting by on tramadol.  He has started anodyne therapy, it is a light therapy since July.  He feels this has really helped.  Does 1/2 hour on calf and foot twice daily on each leg.  Therapy is that it increases blood flow.  As a result of this he stopped the medical THC.  His foot burning and tingling has now resolved.       reviewed and stable.      Patient is calling in regards to filling his medication      Review of Systems         Objective           Vitals:  No vitals were obtained today due to virtual visit.    Physical Exam   healthy, alert and no distress  PSYCH: Alert and oriented times 3; coherent speech, normal   rate and volume, able to articulate logical thoughts, able   to abstract reason, no tangential thoughts, no " hallucinations   or delusions  His affect is normal  RESP: No cough, no audible wheezing, able to talk in full sentences  Remainder of exam unable to be completed due to telephone visits                Phone call duration: 9 minutes

## 2021-01-16 ASSESSMENT — ANXIETY QUESTIONNAIRES: GAD7 TOTAL SCORE: 0

## 2021-02-18 ENCOUNTER — OFFICE VISIT (OUTPATIENT)
Dept: UROLOGY | Facility: OTHER | Age: 66
End: 2021-02-18
Attending: UROLOGY
Payer: MEDICARE

## 2021-02-18 VITALS
DIASTOLIC BLOOD PRESSURE: 78 MMHG | OXYGEN SATURATION: 99 % | BODY MASS INDEX: 25.82 KG/M2 | SYSTOLIC BLOOD PRESSURE: 126 MMHG | RESPIRATION RATE: 16 BRPM | WEIGHT: 161.2 LBS | HEART RATE: 66 BPM

## 2021-02-18 DIAGNOSIS — E29.1 HYPOGONADISM IN MALE: Primary | ICD-10-CM

## 2021-02-18 PROCEDURE — 250N000011 HC RX IP 250 OP 636: Performed by: UROLOGY

## 2021-02-18 PROCEDURE — 99213 OFFICE O/P EST LOW 20 MIN: CPT | Performed by: UROLOGY

## 2021-02-18 PROCEDURE — 96372 THER/PROPH/DIAG INJ SC/IM: CPT | Performed by: UROLOGY

## 2021-02-18 PROCEDURE — G0463 HOSPITAL OUTPT CLINIC VISIT: HCPCS

## 2021-02-18 PROCEDURE — G0463 HOSPITAL OUTPT CLINIC VISIT: HCPCS | Mod: 25

## 2021-02-18 RX ADMIN — TESTOSTERONE UNDECANOATE 750 MG: 250 INJECTION INTRAMUSCULAR at 09:24

## 2021-02-18 ASSESSMENT — PAIN SCALES - GENERAL: PAINLEVEL: MODERATE PAIN (4)

## 2021-02-18 NOTE — NURSING NOTE
Patient presents to the clinic today for 10 week aveed.  Caitie Smith LPN 2/18/2021   9:13 AM    Review of Systems:    Weight loss:    No     Recent fever/chills:  No   Night sweats:   No  Current skin rash:  No   Recent hair loss:  No  Heat intolerance:  No   Cold intolerance:  No  Chest pain:   No   Palpitations:   No  Shortness of breath:  No   Wheezing:   No  Constipation:    No   Diarrhea:   No   Nausea:   No   Vomiting:   No   Kidney/side pain:  No   Back pain:   Yes  Frequent headaches:  No   Dizziness:     No  Leg swelling:   No   Calf pain:    Yes

## 2021-02-18 NOTE — PROGRESS NOTES
Type of Visit  EST    Chief Complaint  Hypogonadism    HPI  Mr. Freedman is a 65 y.o. male who follows up with symptomatic hypogonadism.  Primary symptoms include low energy and poor sleep.  He has been on testosterone replacement for over 6 years.  He previously failed patches and short acting injections.    He continues to do well with AVEED.  Follows up today for treatment.  He continues to donate blood on a routine basis as he does have a history of a mildly elevated hematocrit with testosterone supplementation.  He denies other side effects such as mood swings or acne.  He does continue to donate blood when available.  He continues to be satisfied with the improvement in energy and focus.      Review of Systems  I reviewed the ROS with the patient today.    Nursing Notes:   Caitie Smith LPN  2/18/2021  9:14 AM  Signed  Patient presents to the clinic today for 10 week aveed.  Caitie Smith LPN 2/18/2021   9:13 AM    Review of Systems:    Weight loss:    No     Recent fever/chills:  No   Night sweats:   No  Current skin rash:  No   Recent hair loss:  No  Heat intolerance:  No   Cold intolerance:  No  Chest pain:   No   Palpitations:   No  Shortness of breath:  No   Wheezing:   No  Constipation:    No   Diarrhea:   No   Nausea:   No   Vomiting:   No   Kidney/side pain:  No   Back pain:   Yes  Frequent headaches:  No   Dizziness:     No  Leg swelling:   No   Calf pain:    Yes       Physical Exam  /78 (BP Location: Right arm, Patient Position: Sitting, Cuff Size: Adult Regular)   Pulse 66   Resp 16   Wt 73.1 kg (161 lb 3.2 oz)   SpO2 99%   BMI 25.82 kg/m    Constitutional: No acute distress.  Alert and cooperative   Head: NCAT  Eyes: Conjunctivae normal  Cardiovascular: Regular rate.  Pulmonary/Chest: Respirations are even and non-labored bilaterally, no audible wheezing  Abdominal: Soft. No distension, tenderness, masses or guarding.   Extremities: GILDARDO x 4, Warm. No clubbing.  No cyanosis.     Skin: Pink, warm and dry.  No visible rashes noted.  Back:  No left CVA tenderness.  No right CVA tenderness.  Genitourinary:  Nonpalpable bladder    Labs  Results for TYRON JONES (MRN [id number]) as of 10/1/2020 12:45   10/1/2020 07:49   Prostate Specific Antigen 1.205   Testosterone Total 263   Hematocrit 52.1     Results for TYRON JONES (MRN [id number]) as of 12/30/2019 09:17   12/16/2019 08:47   Testosterone Total 323   Hematocrit 52.1     Results fr TYRON JONES (MRN [id number]) as of 5/30/2019 08:17   5/16/2019 08:48   Prostate Specific Antigen 0.885   Testosterone Total 338   Hematocrit 54.8 (H)     Results for TEJINDER JONES (MRN [id number]) as of 9/22/2016 09:15   3/7/2016 09:16   TESTOSTERONE,TOTAL 104.4 (L)     Testosterone Injection  Today the patient received an injection of testosterone undecanoate 750mg.  This was given in the gluteus.  The results of this injection: None  Patient was monitored for 30 minutes following the injection.    Assessment  Hypogonadism- continue Aveed q10 weeks  He does have a history of elevated hematocrit -he donates blood regularly    Plan  Labs in 2 cycles (hematocrit, T) -same day as next treatment  Aveed 750mg IM every 10 weeks

## 2021-04-12 DIAGNOSIS — F33.0 DEPRESSION, MAJOR, RECURRENT, MILD (H): ICD-10-CM

## 2021-04-13 NOTE — TELEPHONE ENCOUNTER
[facility] sent Rx request for the following:   sertraline (ZOLOFT) 50 MG tablet   SigTake 1 tablet by mouth once daily    Last Prescription Date:   01/06/2021  Last Fill Qty/Refills:         90, R-0    Last Office Visit:              01/15/2021 ([facility])   Future Office visit:           None noted   SSRIs Protocol Failed - 4/12/2021 10:58 AM        Failed - PHQ-9 score less than 5 in past 6 months     Please review last PHQ-9 score.      10/09/2020 PHQ-9 value 7    Routing for PCP review. Unable to complete prescription refill per RN Medication Refill Policy.................... Arina Esquivel RN ....................  4/13/2021   10:05 AM

## 2021-04-29 ENCOUNTER — OFFICE VISIT (OUTPATIENT)
Dept: UROLOGY | Facility: OTHER | Age: 66
End: 2021-04-29
Attending: UROLOGY
Payer: MEDICARE

## 2021-04-29 VITALS
HEART RATE: 68 BPM | DIASTOLIC BLOOD PRESSURE: 78 MMHG | WEIGHT: 156.8 LBS | RESPIRATION RATE: 16 BRPM | BODY MASS INDEX: 25.12 KG/M2 | SYSTOLIC BLOOD PRESSURE: 130 MMHG

## 2021-04-29 DIAGNOSIS — E29.1 HYPOGONADISM IN MALE: Primary | ICD-10-CM

## 2021-04-29 PROCEDURE — 250N000011 HC RX IP 250 OP 636: Performed by: UROLOGY

## 2021-04-29 PROCEDURE — 96372 THER/PROPH/DIAG INJ SC/IM: CPT | Performed by: UROLOGY

## 2021-04-29 PROCEDURE — 99213 OFFICE O/P EST LOW 20 MIN: CPT | Performed by: UROLOGY

## 2021-04-29 PROCEDURE — G0463 HOSPITAL OUTPT CLINIC VISIT: HCPCS | Mod: 25

## 2021-04-29 RX ADMIN — TESTOSTERONE UNDECANOATE 750 MG: 250 INJECTION INTRAMUSCULAR at 09:28

## 2021-04-29 ASSESSMENT — PAIN SCALES - GENERAL: PAINLEVEL: MODERATE PAIN (4)

## 2021-04-29 NOTE — PROGRESS NOTES
"Type of Visit  EST    Chief Complaint  Hypogonadism    HPI  Mr. Freedman is a 65 y.o. male who follows up with symptomatic hypogonadism.  Primary symptoms include low energy and poor sleep.  He has been on testosterone replacement for over 6 years.  He previously failed patches and short acting injections.    He continues to do well with AVEED.  Follows up today for treatment.  He has been receiving treatment every 10 weeks.  His labs were last collected in the early fall.  He will be due for labs at the next visit.    He continues to donate blood on a routine basis as he does have a history of a mildly elevated hematocrit with testosterone supplementation.  He continues to deny other side effects such as mood swings or acne.      Review of Systems  I reviewed the ROS with the patient today.    Nursing Notes:   Batsheva Sheriff LPN  4/29/2021  9:30 AM  Signed  Pt presents to clinic for Aveed injection    Review of Systems:    Weight loss:    No     Recent fever/chills:  No   Night sweats:   No  Current skin rash:  No   Recent hair loss:  No  Heat intolerance:  No   Cold intolerance:  No  Chest pain:   No   Palpitations:   No  Shortness of breath:  No   Wheezing:   No  Constipation:    No   Diarrhea:   No   Nausea:   No   Vomiting:   No   Kidney/side pain:  No   Back pain:   Yes  Frequent headaches:  No   Dizziness:     No  Leg swelling:   No   Calf pain:    Yes    Patient given \"Aveed Treatment: A Patient Guide\" form.  Injection given and patient in clinic for 30 minutes after injection for monitoring.  Patient tolerated injection with no problems.  Batsheva Sheriff LPN .........4/29/2021...9:19 AM         Physical Exam  /78 (BP Location: Left arm, Patient Position: Sitting, Cuff Size: Adult Regular)   Pulse 68   Resp 16   Wt 71.1 kg (156 lb 12.8 oz)   BMI 25.12 kg/m    Constitutional: No acute distress.  Alert and cooperative   Head: NCAT  Eyes: Conjunctivae normal  Cardiovascular: Regular " rate.  Pulmonary/Chest: Respirations are even and non-labored bilaterally, no audible wheezing  Abdominal: Soft. No distension, tenderness, masses or guarding.   Extremities: GILDARDO x 4, Warm. No clubbing.  No cyanosis.    Skin: Pink, warm and dry.  No visible rashes noted.  Back:  No left CVA tenderness.  No right CVA tenderness.  Genitourinary:  Nonpalpable bladder    Labs  Results for TYRON JONES (MRN [id number]) as of 10/1/2020 12:45   10/1/2020 07:49   Prostate Specific Antigen 1.205   Testosterone Total 263   Hematocrit 52.1     Results for TYRON JONES (MRN [id number]) as of 12/30/2019 09:17   12/16/2019 08:47   Testosterone Total 323   Hematocrit 52.1     Results fr TYRON JONES (MRN [id number]) as of 5/30/2019 08:17   5/16/2019 08:48   Prostate Specific Antigen 0.885   Testosterone Total 338   Hematocrit 54.8 (H)     Results for TEJINDER JONES (MRN [id number]) as of 9/22/2016 09:15   3/7/2016 09:16   TESTOSTERONE,TOTAL 104.4 (L)     Testosterone Injection  Today the patient received an injection of testosterone undecanoate 750mg.  This was given in the gluteus.  The results of this injection: None  Patient was monitored for 30 minutes following the injection.    Assessment  Hypogonadism- continue Aveed q10 weeks  He does have a history of elevated hematocrit -he donates blood regularly    Plan  Labs prior to the next treatment (hematocrit, T) -same day as next treatment (ordered)  Aveed 750mg IM every 10 weeks

## 2021-06-29 ENCOUNTER — OFFICE VISIT (OUTPATIENT)
Dept: FAMILY MEDICINE | Facility: OTHER | Age: 66
End: 2021-06-29
Attending: FAMILY MEDICINE
Payer: MEDICARE

## 2021-06-29 VITALS
SYSTOLIC BLOOD PRESSURE: 138 MMHG | WEIGHT: 158.4 LBS | OXYGEN SATURATION: 99 % | TEMPERATURE: 98.6 F | BODY MASS INDEX: 25.37 KG/M2 | RESPIRATION RATE: 14 BRPM | DIASTOLIC BLOOD PRESSURE: 76 MMHG | HEART RATE: 77 BPM

## 2021-06-29 DIAGNOSIS — M96.1 FAILED BACK SYNDROME OF LUMBAR SPINE: Primary | ICD-10-CM

## 2021-06-29 DIAGNOSIS — M51.379 DEGENERATION OF LUMBAR OR LUMBOSACRAL INTERVERTEBRAL DISC: ICD-10-CM

## 2021-06-29 DIAGNOSIS — Z51.81 ENCOUNTER FOR THERAPEUTIC DRUG LEVEL MONITORING: ICD-10-CM

## 2021-06-29 DIAGNOSIS — F33.0 DEPRESSION, MAJOR, RECURRENT, MILD (H): ICD-10-CM

## 2021-06-29 PROCEDURE — 99213 OFFICE O/P EST LOW 20 MIN: CPT | Performed by: FAMILY MEDICINE

## 2021-06-29 PROCEDURE — G0463 HOSPITAL OUTPT CLINIC VISIT: HCPCS

## 2021-06-29 PROCEDURE — 80307 DRUG TEST PRSMV CHEM ANLYZR: CPT | Mod: ZL | Performed by: FAMILY MEDICINE

## 2021-06-29 PROCEDURE — 36415 COLL VENOUS BLD VENIPUNCTURE: CPT | Mod: ZL | Performed by: FAMILY MEDICINE

## 2021-06-29 RX ORDER — TRAMADOL HYDROCHLORIDE 50 MG/1
50 TABLET ORAL EVERY 6 HOURS PRN
Qty: 120 TABLET | Refills: 5 | Status: SHIPPED | OUTPATIENT
Start: 2021-06-29 | End: 2022-01-18

## 2021-06-29 ASSESSMENT — ANXIETY QUESTIONNAIRES
6. BECOMING EASILY ANNOYED OR IRRITABLE: NOT AT ALL
GAD7 TOTAL SCORE: 0
5. BEING SO RESTLESS THAT IT IS HARD TO SIT STILL: NOT AT ALL
2. NOT BEING ABLE TO STOP OR CONTROL WORRYING: NOT AT ALL
1. FEELING NERVOUS, ANXIOUS, OR ON EDGE: NOT AT ALL
7. FEELING AFRAID AS IF SOMETHING AWFUL MIGHT HAPPEN: NOT AT ALL
3. WORRYING TOO MUCH ABOUT DIFFERENT THINGS: NOT AT ALL

## 2021-06-29 ASSESSMENT — PAIN SCALES - GENERAL: PAINLEVEL: MODERATE PAIN (4)

## 2021-06-29 ASSESSMENT — PATIENT HEALTH QUESTIONNAIRE - PHQ9: 5. POOR APPETITE OR OVEREATING: NOT AT ALL

## 2021-06-29 NOTE — LETTER
Opioid / Opioid Plus Controlled Substance Agreement    This is an agreement between you and your provider about the safe and appropriate use of controlled substance/opioids prescribed by your care team. Controlled substances are medicines that can cause physical and mental dependence (abuse).    There are strict laws about having and using these medicines. We here at [facility] are committing to working with you in your efforts to get better. To support you in this work, we ll help you schedule regular office appointments for medicine refills. If we must cancel or change your appointment for any reason, we ll make sure you have enough medicine to last until your next appointment.     As a Provider, I will:    Listen carefully to your concerns and treat you with respect.     Recommend a treatment plan that I believe is in your best interest. This plan may involve therapies other than opioid pain medication.     Talk with you often about the possible benefits, and the risk of harm of any medicine that we prescribe for you.     Provide a plan on how to taper (discontinue or go off) using this medicine if the decision is made to stop its use.    As a Patient, I understand that opioid(s):     Are a controlled substance prescribed by my care team to help me function or work and manage my condition(s).     Are strong medicines and can cause serious side effects such as:    Drowsiness, which can seriously affect my driving ability    A lower breathing rate, enough to cause death    Harm to my thinking ability     Depression     Abuse of and addiction to this medicine    Need to be taken exactly as prescribed. Combining opioids with certain medicines or chemicals (such as illegal drugs, sedatives, sleeping pills, and benzodiazepines) can be dangerous or even fatal. If I stop opioids suddenly, I may have severe withdrawal symptoms.    Do not work for all types of pain nor for all patients. If they re not helpful, I may  be asked to stop them.        The risks, benefits and side effects of these medicine(s) were explained to me. I agree that:  1. I will take part in other treatments as advised by my care team. This may be psychiatry or counseling, physical therapy, behavioral therapy, group treatment or a referral to a specialist.     2. I will keep all my appointments. I understand that this is part of the monitoring of opioids. My care team may require an office visit for EVERY opioid/controlled substance refill. If I miss appointments or don t follow instructions, my care team may stop my medicine.    3. I will take my medicines as prescribed. I will not change the dose or schedule unless my care team tells me to. There will be no refills if I run out early.     4. I may be asked to come to the clinic and complete a urine drug test or complete a pill count at any time. If I don t give a urine sample or participate in a pill count, the care team may stop my medicine.    5. I will only receive prescriptions from this clinic for chronic pain. If I am treated by another provider for acute pain issues, I will tell them that I am taking opioid pain medication for chronic pain and that I have a treatment agreement with this provider. I will inform my [facility] care team within one business day if I am given a prescription for any pain medication by another healthcare provider. My [facility] care team can contact other providers and pharmacists about my use of any medicines.    6. It is up to me to make sure that I don t run out of my medicines on weekends or holidays. If my care team is willing to refill my opioid prescription without a visit, I must request refills only during office hours. Refills may take up to 3 business days to process. I will use one pharmacy to fill all my opioid and other controlled substance prescriptions. I will notify the clinic about any changes to my insurance or medication  availability.    7. I am responsible for my prescriptions. If the medicine/prescription is lost, stolen or destroyed, it will not be replaced. I also agree not to share controlled substance medicines with anyone.    8. I am aware I should not use any illegal or recreational drugs. I agree not to drink alcohol unless my care team says I can.       9. If I enroll in the [place] [facility] Cann[facility] program, I will tell my care team prior to my next refill.     10. I will tell my care team right away if I become pregnant, have a new medical problem treated outside of my regular clinic, or have a change in my medications.    11. I understand that this medicine can affect my thinking, judgment and reaction time. Alcohol and drugs affect the brain and body, which can affect the safety of my driving. Being under the influence of alcohol or drugs can affect my decision-making, behaviors, personal safety, and the safety of others. Driving while impaired (DWI) can occur if a person is driving, operating, or in physical control of a car, motorcycle, boat, snowmobile, ATV, motorbike, off-road vehicle, or any other motor vehicle ([place] Statute 169A.20). I understand the risk if I choose to drive or operate any vehicle or machinery.    I understand that if I do not follow any of the conditions above, my prescriptions or treatment may be stopped or changed.          Opioids  What You Need to Know    What are opioids?   Opioids are pain medicines that must be prescribed by a doctor. They are also known as narcotics.     Examples are:   1. morphine (MS Contin, Deidra)  2. oxycodone (Oxycontin)  3. oxycodone and acetaminophen (Percocet)  4. hydrocodone and acetaminophen (Vicodin, Norco)   5. fentanyl patch (Duragesic)   6. hydromorphone (Dilaudid)   7. methadone  8. codeine (Tylenol #3)     What do opioids do well?   Opioids are best for severe short-term pain such as after a surgery or injury. They may work well for cancer pain. They may  help some people with long-lasting (chronic) pain.     What do opioids NOT do well?   Opioids never get rid of pain entirely, and they don t work well for most patients with chronic pain. Opioids don t reduce swelling, one of the causes of pain.                                    Other ways to manage chronic pain and improve function include:       Treat the health problem that may be causing pain    Anti-inflammation medicines, which reduce swelling and tenderness, such as ibuprofen (Advil, Motrin) or naproxen (Aleve)    Acetaminophen (Tylenol)    Antidepressants and anti-seizure medicines, especially for nerve pain    Topical treatments such as patches or creams    Injections or nerve blocks    Chiropractic or osteopathic treatment    Acupuncture, massage, deep breathing, meditation, visual imagery, aromatherapy    Use heat or ice at the pain site    Physical therapy     Exercise    Stop smoking    Take part in therapy       Risks and side effects     Talk to your doctor before you start or decide to keep taking opioids. Possible side effects include:      Lowering your breathing rate enough to cause death    Overdose, including death, especially if taking higher than prescribed doses    Worse depression symptoms; less pleasure in things you usually enjoy    Feeling tired or sluggish    Slower thoughts or cloudy thinking    Being more sensitive to pain over time; pain is harder to control    Trouble sleeping or restless sleep    Changes in hormone levels (for example, less testosterone)    Changes in sex drive or ability to have sex    Constipation    Unsafe driving    Itching and sweating    Dizziness    Nausea, throwing up and dry mouth    What else should I know about opioids?    Opioids may lead to dependence, tolerance, or addiction.      Dependence means that if you stop or reduce the medicine too quickly, you will have withdrawal symptoms. These include loose poop (diarrhea), jitters, flu-like symptoms,  nervousness and tremors. Dependence is not the same as addiction.                       Tolerance means needing higher doses over time to get the same effect. This may increase the chance of serious side effects.      Addiction is when people improperly use a substance that harms their body, their mind or their relations with others. Use of opiates can cause a relapse of addiction if you have a history of drug or alcohol abuse.      People who have used opioids for a long time may have a lower quality of life, worse depression, higher levels of pain and more visits to doctors.    You can overdose on opioids. Take these steps to lower your risk of overdose:    1. Recognize the signs:  Signs of overdose include decrease or loss of consciousness (blackout), slowed breathing, trouble waking up and blue lips. If someone is worried about overdose, they should call 911.    2. Talk to your doctor about Narcan (naloxone).   If you are at risk for overdose, you may be given a prescription for Narcan. This medicine very quickly reverses the effects of opioids.   If you overdose, a friend or family member can give you Narcan while waiting for the ambulance. They need to know the signs of overdose and how to give Narcan.     3. Don't use alcohol or street drugs.   Taking them with opioids can cause death.    4. Do not take any of these medicines unless your doctor says it s OK. Taking these with opioids can cause death:    Benzodiazepines, such as lorazepam (Ativan), alprazolam (Xanax) or diazepam (Valium)    Muscle relaxers, such as cyclobenzaprine (Flexeril)    Sleeping pills like zolpidem (Ambien)     Other opioids      How to keep you and other people safe while taking opioids:    1. Never share your opioids with others.  Opioid medicines are regulated by the Drug Enforcement Agency ([employer]OS[employer]). Selling or sharing medications is a criminal act.    2. Be sure to store opioids in a secure place, locked up if possible. Young children  can easily swallow them and overdose.    3. When you are traveling with your medicines, keep them in the original bottles. If you use a pill box, be sure you also carry a copy of your medicine list from your clinic or pharmacy.    4. Safe disposal of opioids    Most pharmacies have places to get rid of medicine, called disposal kiosks. Medicine disposal options are also available in every [facility]. Search your county and  medication disposal  to find more options. You can find more details at:  [email or web address]     I agree that my provider, clinic care team, and pharmacy may work with any city, state or federal law enforcement agency that investigates the misuse, sale, or other diversion of my controlled medicine. I will allow my provider to discuss my care with, or share a copy of, this agreement with any other treating provider, pharmacy or emergency room where I receive care.    I have read this agreement and have asked questions about anything I did not understand.    _______________________________________________________  Patient Signature - Km Freedman _____________________                   Date     _______________________________________________________  Provider Signature - Andres Flor MD   _____________________                   Date     _______________________________________________________  Witness Signature (required if provider not present while patient signing)   _____________________                   Date

## 2021-06-29 NOTE — PROGRESS NOTES
"    Assessment & Plan   Problem List Items Addressed This Visit        Musculoskeletal and Integumentary    Degeneration of lumbar or lumbosacral intervertebral disc    Relevant Medications    traMADol (ULTRAM) 50 MG tablet       Behavioral    Depression, major, recurrent, mild (H)       Other    Failed back syndrome of lumbar spine - Primary    Relevant Medications    traMADol (ULTRAM) 50 MG tablet    Other Relevant Orders    Drug Confirmation Panel Urine with Creat      Other Visit Diagnoses     Encounter for therapeutic drug level monitoring         Relevant Orders    Drug Confirmation Panel Urine with Creat           Updated contract today.  Discussed with him risks of the meds, filled for 6 months, follow up in October on hypertension.             BMI:   Estimated body mass index is 25.37 kg/m  as calculated from the following:    Height as of 8/15/20: 1.683 m (5' 6.25\").    Weight as of this encounter: 71.8 kg (158 lb 6.4 oz).           No follow-ups on file.    Andres Flor MD  [facility]    Subjective   Solo is a 65 year old who presents for the following health issues     HPI follow up on tramadol.  He had been on scheduled 2 opiates, is now off them and getting adequate relief from the tramadol. No abuse or diversion.  reviewed and is consistent with the meds.  Rates pain today at 5/10.  Had about 4 weeks of right hip and groin pain last spring.  This has improved with ibuprofen, dramatically.  No pain now at all.      PHQ 12/6/2018 5/16/2019 10/9/2020   PHQ-9 Total Score 0 0 7   Q9: Thoughts of better off dead/self-harm past 2 weeks Not at all Not at all Not at all     BLAKE-7 SCORE 10/9/2020 1/15/2021 6/29/2021   Total Score 6 0 0           Current Outpatient Medications   Medication Instructions     doxycycline monohydrate (MONODOX) 100 MG capsule 2 caps once.     levothyroxine (SYNTHROID/LEVOTHROID) 50 mcg, Oral, DAILY     lisinopril-hydrochlorothiazide (ZESTORETIC) 10-12.5 MG " tablet Take 1 tablet by mouth once daily     medical cannabis (Patient's own supply.  Not a prescription) 1 Dose, SEE ADMIN INSTRUCTIONS, (This is NOT a prescription, and does not certify that the patient has a qualifying medical condition for medical cannabis.  The purpose of this order is  to document that the patient reports taking medical cannabis.)      scopolamine (TRANSDERM) 1 MG/3DAYS 72 hr patch 1 patch, Transdermal, EVERY 72 HOURS     sertraline (ZOLOFT) 50 MG tablet Take 1 tablet by mouth once daily     traMADol (ULTRAM) 50 mg, Oral, EVERY 6 HOURS PRN     triamcinolone (KENALOG) 0.1 % cream Topical               Review of Systems         Objective    /76   Pulse 77   Temp 98.6  F (37  C)   Resp 14   Wt 71.8 kg (158 lb 6.4 oz)   SpO2 99%   BMI 25.37 kg/m    Body mass index is 25.37 kg/m .  Physical Exam  Constitutional:       Appearance: Normal appearance.   Musculoskeletal:      Comments: Can stand easily.  Negative straight leg raise and no current lumbar pain on palpation    Neurological:      General: No focal deficit present.      Mental Status: He is alert and oriented to person, place, and time.   Psychiatric:         Mood and Affect: Mood normal.         Behavior: Behavior normal.         Thought Content: Thought content normal.

## 2021-06-29 NOTE — NURSING NOTE
"Coming in for a medication check up    Chief Complaint   Patient presents with     Recheck Medication     check up       Initial /76   Pulse 77   Temp 98.6  F (37  C)   Resp 14   Wt 71.8 kg (158 lb 6.4 oz)   SpO2 99%   BMI 25.37 kg/m   Estimated body mass index is 25.37 kg/m  as calculated from the following:    Height as of 8/15/20: 1.683 m (5' 6.25\").    Weight as of this encounter: 71.8 kg (158 lb 6.4 oz).  Medication Reconciliation: complete    Kisha Valdovinos LPN  "

## 2021-06-30 ASSESSMENT — ANXIETY QUESTIONNAIRES: GAD7 TOTAL SCORE: 0

## 2021-07-02 LAB
CREAT UR-MCNC: 174 MG/DL
N-NORTRAMADOL/CREAT UR CFM: ABNORMAL NG/MG{CREAT}
O-NORTRAMADOL UR CFM-MCNC: ABNORMAL NG/ML
RPT COMMENT: ABNORMAL
TRAMADOL CTO UR CFM-MCNC: ABNORMAL NG/ML
TRAMADOL/CREAT UR: ABNORMAL NG/MG{CREAT}

## 2021-07-12 ENCOUNTER — LAB (OUTPATIENT)
Dept: LAB | Facility: OTHER | Age: 66
End: 2021-07-12
Attending: UROLOGY
Payer: MEDICARE

## 2021-07-12 ENCOUNTER — OFFICE VISIT (OUTPATIENT)
Dept: UROLOGY | Facility: OTHER | Age: 66
End: 2021-07-12
Attending: UROLOGY
Payer: MEDICARE

## 2021-07-12 VITALS
SYSTOLIC BLOOD PRESSURE: 132 MMHG | RESPIRATION RATE: 16 BRPM | BODY MASS INDEX: 25.79 KG/M2 | HEART RATE: 71 BPM | DIASTOLIC BLOOD PRESSURE: 80 MMHG | WEIGHT: 161 LBS

## 2021-07-12 DIAGNOSIS — E29.1 HYPOGONADISM IN MALE: ICD-10-CM

## 2021-07-12 DIAGNOSIS — E29.1 HYPOGONADISM IN MALE: Primary | ICD-10-CM

## 2021-07-12 LAB
HCT VFR BLD AUTO: 51.6 % (ref 40–53)
TESTOST SERPL-MCNC: 314 NG/DL (ref 240–950)

## 2021-07-12 PROCEDURE — 96372 THER/PROPH/DIAG INJ SC/IM: CPT | Performed by: UROLOGY

## 2021-07-12 PROCEDURE — G0463 HOSPITAL OUTPT CLINIC VISIT: HCPCS | Mod: 25

## 2021-07-12 PROCEDURE — 250N000011 HC RX IP 250 OP 636: Performed by: UROLOGY

## 2021-07-12 PROCEDURE — 36415 COLL VENOUS BLD VENIPUNCTURE: CPT | Mod: ZL

## 2021-07-12 PROCEDURE — 99213 OFFICE O/P EST LOW 20 MIN: CPT | Performed by: UROLOGY

## 2021-07-12 PROCEDURE — 84403 ASSAY OF TOTAL TESTOSTERONE: CPT | Mod: ZL

## 2021-07-12 PROCEDURE — 85014 HEMATOCRIT: CPT | Mod: ZL

## 2021-07-12 RX ADMIN — TESTOSTERONE UNDECANOATE 750 MG: 250 INJECTION INTRAMUSCULAR at 08:42

## 2021-07-12 ASSESSMENT — PAIN SCALES - GENERAL: PAINLEVEL: MODERATE PAIN (5)

## 2021-07-12 NOTE — NURSING NOTE
Pt presents to clinic for aveed injection    Review of Systems:    Weight loss:    No     Recent fever/chills:  No   Night sweats:   No  Current skin rash:  No   Recent hair loss:  No  Heat intolerance:  No   Cold intolerance:  No  Chest pain:   No   Palpitations:   No  Shortness of breath:  No   Wheezing:   No  Constipation:    No   Diarrhea:   No   Nausea:   No   Vomiting:   No   Kidney/side pain:  No   Back pain:   Yes  Frequent headaches:  No   Dizziness:     No  Leg swelling:   No   Calf pain:    Yes

## 2021-07-12 NOTE — PROGRESS NOTES
Type of Visit  EST    Chief Complaint  Hypogonadism    HPI  Mr. Freedman is a 66 y.o. male who follows up with symptomatic hypogonadism.  Primary symptoms include low energy and poor sleep.  He has been on testosterone replacement for over 7 years.  He previously failed patches and short acting injections.    He continues to do well with AVEED.  Follows up today for treatment.  He has been receiving treatment every 10 weeks.  His labs were last collected in the early fall.  He will be due for labs at the next visit.    He continues to donate blood on a routine basis as he does have a history of a mildly elevated hematocrit with testosterone supplementation.  He recently donated blood about 1 month ago.  He continues to deny other side effects such as mood swings or acne.    He underwent routine monitoring labs prior to this injection.      Review of Systems  I reviewed the ROS with the patient today.    Nursing Notes:   Batsheva Sheriff LPN  7/12/2021  8:35 AM  Addendum  Pt presents to clinic for aveed injection    Review of Systems:    Weight loss:    No     Recent fever/chills:  No   Night sweats:   No  Current skin rash:  No   Recent hair loss:  No  Heat intolerance:  No   Cold intolerance:  No  Chest pain:   No   Palpitations:   No  Shortness of breath:  No   Wheezing:   No  Constipation:    No   Diarrhea:   No   Nausea:   No   Vomiting:   No   Kidney/side pain:  No   Back pain:   Yes  Frequent headaches:  No   Dizziness:     No  Leg swelling:   No   Calf pain:    Yes             Physical Exam  /80 (BP Location: Left arm, Patient Position: Sitting, Cuff Size: Adult Regular)   Pulse 71   Resp 16   Wt 73 kg (161 lb)   BMI 25.79 kg/m    Constitutional: No acute distress.  Alert and cooperative   Head: NCAT  Eyes: Conjunctivae normal  Cardiovascular: Regular rate.  Pulmonary/Chest: Respirations are even and non-labored bilaterally, no audible wheezing  Abdominal: Soft. No distension, tenderness, masses  or guarding.   Extremities: GILDARDO x 4, Warm. No clubbing.  No cyanosis.    Skin: Pink, warm and dry.  No visible rashes noted.  Back:  No left CVA tenderness.  No right CVA tenderness.  Genitourinary:  Nonpalpable bladder    Labs  Results for TYRON JONES (MRN [id number]) as of 7/12/2021 09:01   7/12/2021 07:18   Hematocrit 51.6   Testosterone Total 314     Results for TYRON JONES (MRN [id number]) as of 10/1/2020 12:45   10/1/2020 07:49   Prostate Specific Antigen 1.205   Testosterone Total 263   Hematocrit 52.1     Results for TYRON JONES (MRN [id number]) as of 12/30/2019 09:17   12/16/2019 08:47   Testosterone Total 323   Hematocrit 52.1     Results fr TYRON JONES (MRN [id number]) as of 5/30/2019 08:17   5/16/2019 08:48   Prostate Specific Antigen 0.885   Testosterone Total 338   Hematocrit 54.8 (H)     Results for TEJINDER JONES (MRN [id number]) as of 9/22/2016 09:15   3/7/2016 09:16   TESTOSTERONE,TOTAL 104.4 (L)     Testosterone Injection  Today the patient received an injection of testosterone undecanoate 750mg.  This was given in the gluteus.  The results of this injection: None  Patient was monitored for 30 minutes following the injection.    Assessment  Hypogonadism- continue Aveed q10 weeks  Labs reviewed -at goal  He does have a history of elevated hematocrit -he donates blood regularly    Plan  Labs q4 injections  Aveed 750mg IM every 10 weeks

## 2021-07-21 ENCOUNTER — TRANSFERRED RECORDS (OUTPATIENT)
Dept: HEALTH INFORMATION MANAGEMENT | Facility: OTHER | Age: 66
End: 2021-07-21

## 2021-08-07 DIAGNOSIS — E03.8 OTHER SPECIFIED HYPOTHYROIDISM: ICD-10-CM

## 2021-08-10 RX ORDER — LEVOTHYROXINE SODIUM 50 UG/1
TABLET ORAL
Qty: 90 TABLET | Refills: 0 | Status: SHIPPED | OUTPATIENT
Start: 2021-08-10 | End: 2021-11-17

## 2021-08-10 NOTE — TELEPHONE ENCOUNTER
"Requested Prescriptions   Pending Prescriptions Disp Refills     levothyroxine (SYNTHROID/LEVOTHROID) 50 MCG tablet [Pharmacy Med Name: Levothyroxine Sodium 50 MCG Oral Tablet] 90 tablet 0     Sig: Take 1 tablet by mouth once daily       Thyroid Protocol Passed - 8/7/2021 10:38 AM        Passed - Patient is 12 years or older        Passed - Recent (12 mo) or future (30 days) visit within the authorizing provider's specialty     Patient has had an office visit with the authorizing provider or a provider within the authorizing providers department within the previous 12 mos or has a future within next 30 days. See \"Patient Info\" tab in inbasket, or \"Choose Columns\" in Meds & Orders section of the refill encounter.              Passed - Medication is active on med list        Passed - Normal TSH on file in past 12 months     No lab results found.              Prescription approved per [facility] Refill Protocol.  Arina Esquivel RN ....................  8/10/2021   8:46 AM      "

## 2021-08-20 ENCOUNTER — OFFICE VISIT (OUTPATIENT)
Dept: FAMILY MEDICINE | Facility: OTHER | Age: 66
End: 2021-08-20
Attending: FAMILY MEDICINE
Payer: MEDICARE

## 2021-08-20 ENCOUNTER — HOSPITAL ENCOUNTER (OUTPATIENT)
Dept: GENERAL RADIOLOGY | Facility: OTHER | Age: 66
End: 2021-08-20
Attending: FAMILY MEDICINE
Payer: MEDICARE

## 2021-08-20 VITALS
WEIGHT: 158 LBS | DIASTOLIC BLOOD PRESSURE: 74 MMHG | OXYGEN SATURATION: 98 % | HEART RATE: 74 BPM | BODY MASS INDEX: 25.31 KG/M2 | SYSTOLIC BLOOD PRESSURE: 132 MMHG | RESPIRATION RATE: 14 BRPM | TEMPERATURE: 97 F

## 2021-08-20 DIAGNOSIS — M25.552 HIP PAIN, LEFT: ICD-10-CM

## 2021-08-20 DIAGNOSIS — M25.551 HIP PAIN, RIGHT: ICD-10-CM

## 2021-08-20 DIAGNOSIS — M79.10 MYALGIA: Primary | ICD-10-CM

## 2021-08-20 LAB
CK SERPL-CCNC: 113 U/L (ref 30–223)
ERYTHROCYTE [SEDIMENTATION RATE] IN BLOOD BY WESTERGREN METHOD: 2 MM/HR (ref 0–20)
TSH SERPL DL<=0.005 MIU/L-ACNC: 3.4 MU/L (ref 0.4–4)

## 2021-08-20 PROCEDURE — 99213 OFFICE O/P EST LOW 20 MIN: CPT | Performed by: FAMILY MEDICINE

## 2021-08-20 PROCEDURE — 82550 ASSAY OF CK (CPK): CPT | Mod: ZL | Performed by: FAMILY MEDICINE

## 2021-08-20 PROCEDURE — G0463 HOSPITAL OUTPT CLINIC VISIT: HCPCS | Mod: 25

## 2021-08-20 PROCEDURE — 36415 COLL VENOUS BLD VENIPUNCTURE: CPT | Mod: ZL | Performed by: FAMILY MEDICINE

## 2021-08-20 PROCEDURE — 73502 X-RAY EXAM HIP UNI 2-3 VIEWS: CPT | Mod: 50

## 2021-08-20 PROCEDURE — 86200 CCP ANTIBODY: CPT | Mod: ZL | Performed by: FAMILY MEDICINE

## 2021-08-20 PROCEDURE — G0463 HOSPITAL OUTPT CLINIC VISIT: HCPCS

## 2021-08-20 PROCEDURE — 85652 RBC SED RATE AUTOMATED: CPT | Mod: ZL | Performed by: FAMILY MEDICINE

## 2021-08-20 PROCEDURE — 84443 ASSAY THYROID STIM HORMONE: CPT | Mod: ZL | Performed by: FAMILY MEDICINE

## 2021-08-20 PROCEDURE — 86618 LYME DISEASE ANTIBODY: CPT | Mod: ZL | Performed by: FAMILY MEDICINE

## 2021-08-20 ASSESSMENT — ANXIETY QUESTIONNAIRES
5. BEING SO RESTLESS THAT IT IS HARD TO SIT STILL: NOT AT ALL
3. WORRYING TOO MUCH ABOUT DIFFERENT THINGS: NOT AT ALL
1. FEELING NERVOUS, ANXIOUS, OR ON EDGE: NOT AT ALL
IF YOU CHECKED OFF ANY PROBLEMS ON THIS QUESTIONNAIRE, HOW DIFFICULT HAVE THESE PROBLEMS MADE IT FOR YOU TO DO YOUR WORK, TAKE CARE OF THINGS AT HOME, OR GET ALONG WITH OTHER PEOPLE: NOT DIFFICULT AT ALL
7. FEELING AFRAID AS IF SOMETHING AWFUL MIGHT HAPPEN: NOT AT ALL
6. BECOMING EASILY ANNOYED OR IRRITABLE: NOT AT ALL
GAD7 TOTAL SCORE: 0
2. NOT BEING ABLE TO STOP OR CONTROL WORRYING: NOT AT ALL

## 2021-08-20 ASSESSMENT — PATIENT HEALTH QUESTIONNAIRE - PHQ9: 5. POOR APPETITE OR OVEREATING: NOT AT ALL

## 2021-08-20 ASSESSMENT — PAIN SCALES - GENERAL: PAINLEVEL: MODERATE PAIN (5)

## 2021-08-20 NOTE — PROGRESS NOTES
Assessment & Plan   Problem List Items Addressed This Visit     None      Visit Diagnoses     Myalgia    -  Primary    Relevant Orders    Sedimentation Rate (ESR)    Lyme Disease Ab with reflex to WB Serum    TSH    Cyclic Citrullinated Peptide Antibody IgG    CK Total    Hip pain, left        Relevant Orders    XR Hip Left 2-3 Views (Completed)    Hip pain, right        Relevant Orders    XR Hip Right 2-3 Views (Completed)         He clearly has a degree of djd.  The other symptoms might have been PMR, or other inflammatory arthritis like rheumatoid arthritis.  Given a normal esr now, it appears to have self resolved.    Clearly also has a djd component.  Tylenol, progressing to PT or steroid injections in the future if he wants.    32 minutes in his cares today.                 No follow-ups on file.    Andres Flor MD  [facility]    Subjective   Solo is a 66 year old who presents for the following health issues     HPI muscle soreness.  Has been getting aching lately even riding on his lawnmower.  Over the last 4 months progressing. For a few days he had pain in groin just getting up from bed.  Reduced his activity and since then symptoms in groin and hips slowly improved, but now shoulders are aching.  Seemed to move around in hips, lateral one day, then groin another.  Had been extremely tired for a while.  Was able to walk with the flare, with pain.  Had a tick bite in early spring.  Pains improve with use.  Seems the worse at night. His medical THC helps him sleep.       Current Outpatient Medications:      levothyroxine (SYNTHROID/LEVOTHROID) 50 MCG tablet, Take 1 tablet by mouth once daily, Disp: 90 tablet, Rfl: 0     lisinopril-hydrochlorothiazide (ZESTORETIC) 10-12.5 MG tablet, Take 1 tablet by mouth once daily, Disp: 90 tablet, Rfl: 2     medical cannabis (Patient's own supply.  Not a prescription), 1 Dose See Admin Instructions (This is NOT a prescription, and does not certify  that the patient has a qualifying medical condition for medical cannabis.  The purpose of this order is  to document that the patient reports taking medical cannabis.), Disp: , Rfl:      scopolamine (TRANSDERM) 1 MG/3DAYS 72 hr patch, Place 1 patch onto the skin every 72 hours, Disp: 4 patch, Rfl: 3     sertraline (ZOLOFT) 50 MG tablet, Take 1 tablet by mouth once daily, Disp: 90 tablet, Rfl: 1     traMADol (ULTRAM) 50 MG tablet, Take 1 tablet (50 mg) by mouth every 6 hours as needed for severe pain, Disp: 120 tablet, Rfl: 5     triamcinolone (KENALOG) 0.1 % cream, , Disp: , Rfl:           Review of Systems         Objective    /74   Pulse 74   Temp 97  F (36.1  C)   Resp 14   Wt 71.7 kg (158 lb)   SpO2 98%   BMI 25.31 kg/m    Body mass index is 25.31 kg/m .  Physical Exam  Constitutional:       Appearance: Normal appearance.   Musculoskeletal:      Comments: Shoulders with full range of motion.  No weakness.  No pain over greater trochanter.     Neurological:      General: No focal deficit present.      Mental Status: He is alert and oriented to person, place, and time.   Psychiatric:         Mood and Affect: Mood normal.         Behavior: Behavior normal.         Thought Content: Thought content normal.            XR Hip Left 2-3 Views    Result Date: 8/20/2021  PROCEDURE: XR HIP LEFT 2-3 VIEWS 8/20/2021 10:30 AM HISTORY: Hip pain, left COMPARISONS: None. TECHNIQUE: AP view of the pelvis and AP and lateral views of the left hip. FINDINGS: There is been previous lower lumbar fusion. Neurostimulator wires are partially seen. There is moderate degenerative change in both hips. No acute fracture or dislocation is seen and there is no focal bone lesion. There is degenerative change in the lower lumbar spine as well.        IMPRESSION: Multifocal degenerative change. DES LOZANO MD   SYSTEM ID:  [id number]    XR Hip Right 2-3 Views    Result Date: 8/20/2021  PROCEDURE: XR HIP RIGHT 2-3 VIEWS 8/20/2021  10:31 AM HISTORY: Hip pain, right COMPARISONS: None. TECHNIQUE: AP and lateral views of the right hip. FINDINGS: There is at least moderate degenerative change in the right hip with joint space narrowing and subchondral sclerosis. Degenerative changes seen in the lower lumbar spine. There is been previous lumbar fusion.        IMPRESSION: Moderate degenerative change in the right hip. DES LOZANO MD   SYSTEM ID:  [id number]

## 2021-08-20 NOTE — NURSING NOTE
"Coming in for muscle soreness     Hips  Groin   Bilateral shoulders       Chief Complaint   Patient presents with     Musculoskeletal Problem     hips, groin and bilateral shoulders        Initial /74   Pulse 74   Temp 97  F (36.1  C)   Resp 14   Wt 71.7 kg (158 lb)   SpO2 98%   BMI 25.31 kg/m   Estimated body mass index is 25.31 kg/m  as calculated from the following:    Height as of 8/15/20: 1.683 m (5' 6.25\").    Weight as of this encounter: 71.7 kg (158 lb).  Medication Reconciliation: complete.  FOOD SECURITY SCREENING QUESTIONS  Hunger Vital Signs:  Within the past 12 months we worried whether our food would run out before we got money to buy more. Never  Within the past 12 months the food we bought just didn't last and we didn't have money to get more. Never  Kisha Valdovinos LPN 8/20/2021 9:48 AM      Kisha Valdovinos LPN    "

## 2021-08-21 LAB — B BURGDOR IGG+IGM SER QL: 0.1

## 2021-08-21 ASSESSMENT — ANXIETY QUESTIONNAIRES: GAD7 TOTAL SCORE: 0

## 2021-08-23 LAB — CCP AB SER IA-ACNC: 0.7 U/ML

## 2021-08-30 ENCOUNTER — OFFICE VISIT (OUTPATIENT)
Dept: FAMILY MEDICINE | Facility: OTHER | Age: 66
End: 2021-08-30
Attending: FAMILY MEDICINE
Payer: MEDICARE

## 2021-08-30 VITALS
TEMPERATURE: 97.6 F | BODY MASS INDEX: 23.73 KG/M2 | DIASTOLIC BLOOD PRESSURE: 62 MMHG | WEIGHT: 156.6 LBS | SYSTOLIC BLOOD PRESSURE: 130 MMHG | HEART RATE: 58 BPM | HEIGHT: 68 IN | OXYGEN SATURATION: 98 % | RESPIRATION RATE: 16 BRPM

## 2021-08-30 DIAGNOSIS — M25.512 PAIN OF BOTH SHOULDER JOINTS: Primary | ICD-10-CM

## 2021-08-30 DIAGNOSIS — M25.511 PAIN OF BOTH SHOULDER JOINTS: Primary | ICD-10-CM

## 2021-08-30 PROCEDURE — 250N000011 HC RX IP 250 OP 636: Performed by: FAMILY MEDICINE

## 2021-08-30 PROCEDURE — 20610 DRAIN/INJ JOINT/BURSA W/O US: CPT | Performed by: FAMILY MEDICINE

## 2021-08-30 PROCEDURE — G0463 HOSPITAL OUTPT CLINIC VISIT: HCPCS

## 2021-08-30 RX ORDER — METHYLPREDNISOLONE ACETATE 40 MG/ML
40 INJECTION, SUSPENSION INTRA-ARTICULAR; INTRALESIONAL; INTRAMUSCULAR; SOFT TISSUE ONCE
Status: COMPLETED | OUTPATIENT
Start: 2021-08-30 | End: 2021-08-30

## 2021-08-30 RX ADMIN — METHYLPREDNISOLONE ACETATE 40 MG: 40 INJECTION, SUSPENSION INTRA-ARTICULAR; INTRALESIONAL; INTRAMUSCULAR; SOFT TISSUE at 10:57

## 2021-08-30 RX ADMIN — METHYLPREDNISOLONE ACETATE 40 MG: 40 INJECTION, SUSPENSION INTRA-ARTICULAR; INTRALESIONAL; INTRAMUSCULAR; SOFT TISSUE at 10:56

## 2021-08-30 ASSESSMENT — ANXIETY QUESTIONNAIRES
6. BECOMING EASILY ANNOYED OR IRRITABLE: NOT AT ALL
7. FEELING AFRAID AS IF SOMETHING AWFUL MIGHT HAPPEN: NOT AT ALL
1. FEELING NERVOUS, ANXIOUS, OR ON EDGE: NOT AT ALL
2. NOT BEING ABLE TO STOP OR CONTROL WORRYING: NOT AT ALL
GAD7 TOTAL SCORE: 0
3. WORRYING TOO MUCH ABOUT DIFFERENT THINGS: NOT AT ALL
5. BEING SO RESTLESS THAT IT IS HARD TO SIT STILL: NOT AT ALL

## 2021-08-30 ASSESSMENT — PAIN SCALES - GENERAL: PAINLEVEL: MODERATE PAIN (4)

## 2021-08-30 ASSESSMENT — MIFFLIN-ST. JEOR: SCORE: 1454.83

## 2021-08-30 ASSESSMENT — PATIENT HEALTH QUESTIONNAIRE - PHQ9: 5. POOR APPETITE OR OVEREATING: NOT AT ALL

## 2021-08-30 NOTE — NURSING NOTE
Patient here for bilateral shoulder pain the left is worse pain. He would like an injection. Medication Reconciliation: complete.    Neisha Paniagua LPN  8/30/2021 9:52 AM

## 2021-08-30 NOTE — PROGRESS NOTES
"    Assessment & Plan   Problem List Items Addressed This Visit     None      Visit Diagnoses     Pain of both shoulder joints    -  Primary    Relevant Medications    methylPREDNISolone (DEPO-MEDROL) injection 40 mg (Completed)    methylPREDNISolone (DEPO-MEDROL) injection 40 mg (Completed)    Other Relevant Orders    Large Joint/Bursa injection and/or drainage (Shoulder, Knee) (Completed)         I suspect his is DJD.  If these injections not helpful, then consider PT and imaging with x-rays.                 No follow-ups on file.    Andres Flor MD  [facility]    Subjective   Solo is a 66 year old who presents for the following health issues     HPI shoulder pains for a few months.  Wants to get injections in them.  Worse with reaching overhead. Cranking a floor jigna on a trailer. Trying to rest them. Pain bilateral, but worse on the left. No specific injuries.  Has never had shoulder injections.            Review of Systems         Objective    /62   Pulse 58   Temp 97.6  F (36.4  C)   Resp 16   Ht 1.727 m (5' 8\")   Wt 71 kg (156 lb 9.6 oz)   SpO2 98%   BMI 23.81 kg/m    Body mass index is 23.81 kg/m .  Physical Exam  Constitutional:       Appearance: Normal appearance.   Musculoskeletal:      Comments: Bilateral shoulders with full range of motion, mild pain at extremes of abduction.  Negative impingement and negative empty can. No pain on palpation. Discussed with him risks of injetcions. He consented.  Right prepped and infiltrated in sub acromial approach with 2 ml 1% lidocaine and 40 milligram depotmedrol.  The same was don then on the left.       Neurological:      Mental Status: He is alert.   Psychiatric:         Mood and Affect: Mood normal.         Behavior: Behavior normal.         Thought Content: Thought content normal.                        "

## 2021-08-31 ASSESSMENT — ANXIETY QUESTIONNAIRES: GAD7 TOTAL SCORE: 0

## 2021-09-20 ENCOUNTER — OFFICE VISIT (OUTPATIENT)
Dept: UROLOGY | Facility: OTHER | Age: 66
End: 2021-09-20
Attending: UROLOGY
Payer: MEDICARE

## 2021-09-20 VITALS
BODY MASS INDEX: 23.42 KG/M2 | WEIGHT: 154 LBS | HEART RATE: 60 BPM | RESPIRATION RATE: 16 BRPM | SYSTOLIC BLOOD PRESSURE: 128 MMHG | DIASTOLIC BLOOD PRESSURE: 74 MMHG

## 2021-09-20 DIAGNOSIS — E29.1 HYPOGONADISM IN MALE: Primary | ICD-10-CM

## 2021-09-20 PROCEDURE — G0463 HOSPITAL OUTPT CLINIC VISIT: HCPCS | Mod: 25

## 2021-09-20 PROCEDURE — 96372 THER/PROPH/DIAG INJ SC/IM: CPT | Performed by: UROLOGY

## 2021-09-20 PROCEDURE — 99213 OFFICE O/P EST LOW 20 MIN: CPT | Performed by: UROLOGY

## 2021-09-20 PROCEDURE — 250N000011 HC RX IP 250 OP 636: Performed by: UROLOGY

## 2021-09-20 RX ADMIN — TESTOSTERONE UNDECANOATE 750 MG: 250 INJECTION INTRAMUSCULAR at 08:38

## 2021-09-20 ASSESSMENT — PAIN SCALES - GENERAL: PAINLEVEL: MODERATE PAIN (4)

## 2021-09-20 NOTE — NURSING NOTE
"Pt presents to clinic for Aveed injection    Review of Systems:    Weight loss:    No     Recent fever/chills:  No   Night sweats:   No  Current skin rash:  No   Recent hair loss:  No  Heat intolerance:  No   Cold intolerance:  No  Chest pain:   No   Palpitations:   No  Shortness of breath:  No   Wheezing:   No  Constipation:    No   Diarrhea:   No   Nausea:   No   Vomiting:   No   Kidney/side pain:  No   Back pain:   Yes  Frequent headaches:  No   Dizziness:     No  Leg swelling:   No   Calf pain:    Yes  Patient given \"Aveed Treatment: A Patient Guide\" form.  Injection given and patient in clinic for 30 minutes after injection for monitoring.  Patient tolerated injection with no problems.  Batsheva Sheriff LPN .........9/20/2021...8:25 AM          "

## 2021-09-20 NOTE — PROGRESS NOTES
"Type of Visit  EST    Chief Complaint  Hypogonadism    HPI  Mr. Freedman is a 66 y.o. male who follows up with symptomatic hypogonadism.  Primary symptoms include low energy and poor sleep.  He has been on testosterone replacement for over 7 years.  He previously failed patches and short acting injections.    He continues to do well with AVEED.  Follows up today for treatment.  He has been receiving treatment every 10 weeks.  His labs were last collected early this summer.  Values were at goal.  His symptoms are also at goal.    He continues to donate blood on a routine basis as he does have a history of a mildly elevated hematocrit with testosterone supplementation.  He recently donated blood a few months back.      Review of Systems  I reviewed the ROS with the patient today.    Nursing Notes:   Batsheva Sheriff LPN  9/20/2021  8:26 AM  Addendum  Pt presents to clinic for Aveed injection    Review of Systems:    Weight loss:    No     Recent fever/chills:  No   Night sweats:   No  Current skin rash:  No   Recent hair loss:  No  Heat intolerance:  No   Cold intolerance:  No  Chest pain:   No   Palpitations:   No  Shortness of breath:  No   Wheezing:   No  Constipation:    No   Diarrhea:   No   Nausea:   No   Vomiting:   No   Kidney/side pain:  No   Back pain:   Yes  Frequent headaches:  No   Dizziness:     No  Leg swelling:   No   Calf pain:    Yes  Patient given \"Aveed Treatment: A Patient Guide\" form.  Injection given and patient in clinic for 30 minutes after injection for monitoring.  Patient tolerated injection with no problems.  Batsheva Sheriff LPN .........9/20/2021...8:25 AM          Physical Exam  /74 (BP Location: Left arm, Patient Position: Sitting, Cuff Size: Adult Regular)   Pulse 60   Resp 16   Wt 69.9 kg (154 lb)   BMI 23.42 kg/m    Constitutional: No acute distress.  Alert and cooperative   Head: NCAT  Eyes: Conjunctivae normal  Cardiovascular: Regular rate.  Pulmonary/Chest: " Respirations are even and non-labored bilaterally, no audible wheezing  Abdominal: Soft. No distension, tenderness, masses or guarding.   Extremities: GILDARDO x 4, Warm. No clubbing.  No cyanosis.    Skin: Pink, warm and dry.  No visible rashes noted.  Back:  No left CVA tenderness.  No right CVA tenderness.  Genitourinary:  Nonpalpable bladder    Labs  Results for TYRON JONES (MRN [id number]) as of 7/12/2021 09:01   7/12/2021 07:18   Hematocrit 51.6   Testosterone Total 314     Results for TYRON JONES (MRN [id number]) as of 10/1/2020 12:45   10/1/2020 07:49   Prostate Specific Antigen 1.205   Testosterone Total 263   Hematocrit 52.1     Results for TYRON JONES (MRN [id number]) as of 12/30/2019 09:17   12/16/2019 08:47   Testosterone Total 323   Hematocrit 52.1     Results fr TYRON JONES (MRN [id number]) as of 5/30/2019 08:17   5/16/2019 08:48   Prostate Specific Antigen 0.885   Testosterone Total 338   Hematocrit 54.8 (H)     Results for TEJNIDER JONES (MRN [id number]) as of 9/22/2016 09:15   3/7/2016 09:16   TESTOSTERONE,TOTAL 104.4 (L)     Testosterone Injection  Today the patient received an injection of testosterone undecanoate 750mg.  This was given in the gluteus.  The results of this injection: None  Patient was monitored for 30 minutes following the injection.    Assessment  Hypogonadism- continue Aveed q10 weeks  Patient is doing well  He does have a history of elevated hematocrit -he donates blood regularly    Plan  Labs q4 injections  Aveed 750mg IM every 10 weeks

## 2021-10-09 ENCOUNTER — HEALTH MAINTENANCE LETTER (OUTPATIENT)
Age: 66
End: 2021-10-09

## 2021-10-21 DIAGNOSIS — F33.0 DEPRESSION, MAJOR, RECURRENT, MILD (H): ICD-10-CM

## 2021-10-21 DIAGNOSIS — I10 ESSENTIAL HYPERTENSION: ICD-10-CM

## 2021-10-22 RX ORDER — LISINOPRIL/HYDROCHLOROTHIAZIDE 10-12.5 MG
TABLET ORAL
Qty: 90 TABLET | Refills: 2 | Status: SHIPPED | OUTPATIENT
Start: 2021-10-22 | End: 2022-07-13

## 2021-10-22 NOTE — TELEPHONE ENCOUNTER
[facility] sent Rx request for the following:   sertraline (ZOLOFT) 50 MG tablet  Sig Take 1 tablet by mouth once daily    Last Prescription Date:   04/13/2021  Last Fill Qty/Refills:         90, R-1    SSRIs Protocol Failed - 10/21/2021  9:10 AM       Failed - PHQ-9 score less than 5 in past 6 months    Please review last PHQ-9 score.      Last PHQ 10/09/2020 value of 7    lisinopril-hydrochlorothiazide (ZESTORETIC) 10-12.5 MG tablet  Sig: Take 1 tablet by mouth once daily    Last Prescription Date:   01/06/2021  Last Fill Qty/Refills:         90, R-2    Diuretics (Including Combos) Protocol Failed - 10/21/2021  9:10 AM       Failed - Normal serum creatinine on file in past 12 months    Recent Labs   Lab Test 10/09/20  1327   CR 1.06             Failed - Normal serum potassium on file in past 12 months    Recent Labs   Lab Test 10/09/20  1327   POTASSIUM 4.1                   Failed - Normal serum sodium on file in past 12 months    Recent Labs   Lab Test 10/09/20  1327              Last Office Visit:              08/30/2021 (Merged with [facility])   Future Office visit:           None noted    Unable to complete prescription refill per RN Medication Refill Policy.................... Arina Esquivel RN ....................  10/22/2021   10:30 AM

## 2021-11-15 DIAGNOSIS — E03.8 OTHER SPECIFIED HYPOTHYROIDISM: ICD-10-CM

## 2021-11-16 NOTE — TELEPHONE ENCOUNTER
"[facility] sent Rx request for the following:   EUTHYROX 50 MCG tablet  SigTake 1 tablet by mouth once daily    Last Prescription Date:   08/10/2021  Last Fill Qty/Refills:         90, R-0    Last Office Visit:              08/30/2021 ([facility])   Future Office visit:           None noted   Thyroid Protocol Passed - 11/16/2021  1:30 PM        Passed - Patient is 12 years or older        Passed - Recent (12 mo) or future (30 days) visit within the authorizing provider's specialty     Patient has had an office visit with the authorizing provider or a provider within the authorizing providers department within the previous 12 mos or has a future within next 30 days. See \"Patient Info\" tab in inbasket, or \"Choose Columns\" in Meds & Orders section of the refill encounter.              Passed - Medication is active on med list        Passed - Normal TSH on file in past 12 months     Recent Labs   Lab Test 08/20/21  1009   TSH 3.40               Unable to complete prescription refill per RN Medication Refill Policy.................... Arina Esquivel RN ....................  11/16/2021   2:34 PM          "

## 2021-11-17 RX ORDER — LEVOTHYROXINE SODIUM 50 UG/1
TABLET ORAL
Qty: 90 TABLET | Refills: 4 | Status: SHIPPED | OUTPATIENT
Start: 2021-11-17 | End: 2022-10-21

## 2021-11-29 ENCOUNTER — OFFICE VISIT (OUTPATIENT)
Dept: UROLOGY | Facility: OTHER | Age: 66
End: 2021-11-29
Attending: UROLOGY
Payer: MEDICARE

## 2021-11-29 VITALS
OXYGEN SATURATION: 96 % | WEIGHT: 158.8 LBS | SYSTOLIC BLOOD PRESSURE: 132 MMHG | DIASTOLIC BLOOD PRESSURE: 68 MMHG | HEART RATE: 76 BPM | BODY MASS INDEX: 24.15 KG/M2 | RESPIRATION RATE: 16 BRPM

## 2021-11-29 DIAGNOSIS — E29.1 HYPOGONADISM IN MALE: Primary | ICD-10-CM

## 2021-11-29 PROCEDURE — 96372 THER/PROPH/DIAG INJ SC/IM: CPT | Performed by: UROLOGY

## 2021-11-29 PROCEDURE — 250N000011 HC RX IP 250 OP 636: Performed by: UROLOGY

## 2021-11-29 PROCEDURE — G0463 HOSPITAL OUTPT CLINIC VISIT: HCPCS | Mod: 25

## 2021-11-29 PROCEDURE — 99212 OFFICE O/P EST SF 10 MIN: CPT | Performed by: UROLOGY

## 2021-11-29 RX ADMIN — TESTOSTERONE UNDECANOATE 750 MG: 250 INJECTION INTRAMUSCULAR at 08:35

## 2021-11-29 ASSESSMENT — PAIN SCALES - GENERAL: PAINLEVEL: MODERATE PAIN (4)

## 2021-11-29 NOTE — NURSING NOTE
Pt presents to clinic for aveed injcetion    Review of Systems:    Weight loss:    No     Recent fever/chills:  No   Night sweats:   No  Current skin rash:  No   Recent hair loss:  No  Heat intolerance:  No   Cold intolerance:  No  Chest pain:   No   Palpitations:   No  Shortness of breath:  No   Wheezing:   No  Constipation:    No   Diarrhea:   No   Nausea:   No   Vomiting:   No   Kidney/side pain:  No   Back pain:   Yes  Frequent headaches:  No   Dizziness:     No  Leg swelling:   No   Calf pain:    Yes

## 2021-11-29 NOTE — PROGRESS NOTES
Type of Visit  EST    Chief Complaint  Hypogonadism    HPI  Mr. Freedman is a 66 y.o. male who follows up with symptomatic hypogonadism.  Primary symptoms include low energy and poor sleep.  He has been on testosterone replacement for over 7 years.  He previously failed patches and short acting injections.    He continues to do well with AVEED.  Follows up today for treatment.  He has been receiving treatment every 10 weeks.  Labs were recently chanced and at goal.  His symptoms continue to be at goal.    He continues to donate blood on a routine basis as he does have a history of a mildly elevated hematocrit with testosterone supplementation.  He recently donated blood a few months back.      Review of Systems  I reviewed the ROS with the patient today.    Nursing Notes:   Batsheva Sheriff LPN  11/29/2021  8:26 AM  Addendum  Pt presents to clinic for aveed injcetion    Review of Systems:    Weight loss:    No     Recent fever/chills:  No   Night sweats:   No  Current skin rash:  No   Recent hair loss:  No  Heat intolerance:  No   Cold intolerance:  No  Chest pain:   No   Palpitations:   No  Shortness of breath:  No   Wheezing:   No  Constipation:    No   Diarrhea:   No   Nausea:   No   Vomiting:   No   Kidney/side pain:  No   Back pain:   Yes  Frequent headaches:  No   Dizziness:     No  Leg swelling:   No   Calf pain:    Yes          Physical Exam  /68 (BP Location: Right arm, Patient Position: Sitting, Cuff Size: Adult Regular)   Pulse 76   Resp 16   Wt 72 kg (158 lb 12.8 oz)   SpO2 96%   BMI 24.15 kg/m    Constitutional: No acute distress.  Alert and cooperative   Head: NCAT  Eyes: Conjunctivae normal  Cardiovascular: Regular rate.  Pulmonary/Chest: Respirations are even and non-labored bilaterally, no audible wheezing  Abdominal: Soft. No distension, tenderness, masses or guarding.   Extremities: GILDARDO x 4, Warm. No clubbing.  No cyanosis.    Skin: Pink, warm and dry.  No visible rashes noted.  Back:   No left CVA tenderness.  No right CVA tenderness.  Genitourinary:  Nonpalpable bladder    Labs  Results for TYRON JONES (MRN [id number]) as of 7/12/2021 09:01   7/12/2021 07:18   Hematocrit 51.6   Testosterone Total 314     Results for TYRON JONES (MRN [id number]) as of 10/1/2020 12:45   10/1/2020 07:49   Prostate Specific Antigen 1.205   Testosterone Total 263   Hematocrit 52.1     Results for TYRON JONES (MRN [id number]) as of 12/30/2019 09:17   12/16/2019 08:47   Testosterone Total 323   Hematocrit 52.1     Results fr TYRON JONES (MRN [id number]) as of 5/30/2019 08:17   5/16/2019 08:48   Prostate Specific Antigen 0.885   Testosterone Total 338   Hematocrit 54.8 (H)     Results for TEJINDER JONES (MRN [id number]) as of 9/22/2016 09:15   3/7/2016 09:16   TESTOSTERONE,TOTAL 104.4 (L)     Testosterone Injection  Today the patient received an injection of testosterone undecanoate 750mg.  This was given in the gluteus.  The results of this injection: None  Patient was monitored for 30 minutes following the injection.    Assessment  Hypogonadism- continue Aveed q10 weeks  Patient is doing well  He does have a history of elevated hematocrit -he donates blood regularly    Plan  Labs q4 injections   Aveed 750mg IM every 10 weeks

## 2022-01-17 DIAGNOSIS — M96.1 FAILED BACK SYNDROME OF LUMBAR SPINE: ICD-10-CM

## 2022-01-17 DIAGNOSIS — M51.379 DEGENERATION OF LUMBAR OR LUMBOSACRAL INTERVERTEBRAL DISC: ICD-10-CM

## 2022-01-18 NOTE — TELEPHONE ENCOUNTER
Dr Flor     Request from [facility]    : traMADol (ULTRAM) 50 MG tablet     Sig: TAKE 1 TABLET BY MOUTH EVERY 6 HOURS AS NEEDED FOR SEVERE PAIN     Last Written Prescription Date:  6/29/21  Last Fill Quantity: 120,   # refills: 5  Last Office Visit: 8/30/21  Future Office visit:    Next 5 appointments (look out 90 days)    Feb 07, 2022  8:30 AM  Return Visit with Peter Turner MD  [facility] ([facility] ) [address]  [place] [address]  [phone]           Routing refill request to provider for review/approval because:  RN unable to approve per Medication Refill Protocol requirements. Please review, thank you. Unable to complete prescription refillper RN Medication Refill Policy....................  Stephanie Ayala RN....................  1/18/2022   2:44 PM

## 2022-01-19 RX ORDER — TRAMADOL HYDROCHLORIDE 50 MG/1
TABLET ORAL
Qty: 120 TABLET | Refills: 0 | Status: SHIPPED | OUTPATIENT
Start: 2022-01-19 | End: 2022-02-21

## 2022-02-07 ENCOUNTER — OFFICE VISIT (OUTPATIENT)
Dept: UROLOGY | Facility: OTHER | Age: 67
End: 2022-02-07
Attending: UROLOGY
Payer: MEDICARE

## 2022-02-07 VITALS
HEART RATE: 78 BPM | SYSTOLIC BLOOD PRESSURE: 132 MMHG | DIASTOLIC BLOOD PRESSURE: 70 MMHG | RESPIRATION RATE: 16 BRPM | OXYGEN SATURATION: 97 %

## 2022-02-07 DIAGNOSIS — E29.1 HYPOGONADISM IN MALE: Primary | ICD-10-CM

## 2022-02-07 PROCEDURE — 250N000011 HC RX IP 250 OP 636: Performed by: UROLOGY

## 2022-02-07 PROCEDURE — 96372 THER/PROPH/DIAG INJ SC/IM: CPT | Performed by: UROLOGY

## 2022-02-07 PROCEDURE — 99213 OFFICE O/P EST LOW 20 MIN: CPT | Performed by: UROLOGY

## 2022-02-07 PROCEDURE — G0463 HOSPITAL OUTPT CLINIC VISIT: HCPCS | Mod: 25

## 2022-02-07 RX ADMIN — TESTOSTERONE UNDECANOATE 750 MG: 250 INJECTION INTRAMUSCULAR at 08:41

## 2022-02-07 ASSESSMENT — PAIN SCALES - GENERAL: PAINLEVEL: MILD PAIN (3)

## 2022-02-07 NOTE — NURSING NOTE
"Pt presents to clinic for Aveed    Review of Systems:    Weight loss:    No     Recent fever/chills:  No   Night sweats:   No  Current skin rash:  No   Recent hair loss:  No  Heat intolerance:  No   Cold intolerance:  No  Chest pain:   No   Palpitations:   No  Shortness of breath:  No   Wheezing:   No  Constipation:    No   Diarrhea:   No   Nausea:   No   Vomiting:   No   Kidney/side pain:  No   Back pain:   Yes  Frequent headaches:  No   Dizziness:     No  Leg swelling:   No   Calf pain:    Yes    Patient given \"Aveed Treatment: A Patient Guide\" form.  Injection given and patient in clinic for 30 minutes after injection for monitoring.  Patient tolerated injection with no problems.  Batsheva Sheriff LPN .........2/7/2022...8:31 AM        "

## 2022-02-07 NOTE — PROGRESS NOTES
"Type of Visit  EST    Chief Complaint  Hypogonadism    HPI  Mr. Freedman is a 66 y.o. male who follows up with symptomatic hypogonadism.  Primary symptoms include low energy and poor sleep.  He has been on testosterone replacement for over 7 years.  He previously failed patches and short acting injections.  He follows up today as he receives AVEED every 10 weeks.  He denies side effects.  Continuing to do well with this treatment and symptoms are at goal.    He continues to donate blood on a routine basis as he does have a history of a mildly elevated hematocrit with testosterone supplementation.  He has donated since the last visit.  He recently donated blood a few months back.      Review of Systems  I reviewed the ROS with the patient today.    Nursing Notes:   Batsheva Sheriff LPN  2/7/2022  8:31 AM  Addendum  Pt presents to clinic for Aveed    Review of Systems:    Weight loss:    No     Recent fever/chills:  No   Night sweats:   No  Current skin rash:  No   Recent hair loss:  No  Heat intolerance:  No   Cold intolerance:  No  Chest pain:   No   Palpitations:   No  Shortness of breath:  No   Wheezing:   No  Constipation:    No   Diarrhea:   No   Nausea:   No   Vomiting:   No   Kidney/side pain:  No   Back pain:   Yes  Frequent headaches:  No   Dizziness:     No  Leg swelling:   No   Calf pain:    Yes    Patient given \"Aveed Treatment: A Patient Guide\" form.  Injection given and patient in clinic for 30 minutes after injection for monitoring.  Patient tolerated injection with no problems.  Batsheva Sheriff LPN .........2/7/2022...8:31 AM          Physical Exam  /70 (BP Location: Left arm, Patient Position: Sitting, Cuff Size: Adult Regular)   Pulse 78   Resp 16   SpO2 97%   Constitutional: No acute distress.  Alert and cooperative   Head: NCAT  Eyes: Conjunctivae normal  Cardiovascular: Regular rate.  Pulmonary/Chest: Respirations are even and non-labored bilaterally, no audible wheezing  Abdominal: " Soft. No distension, tenderness, masses or guarding.   Extremities: GILDARDO x 4, Warm. No clubbing.  No cyanosis.    Skin: Pink, warm and dry.  No visible rashes noted.  Back:  No left CVA tenderness.  No right CVA tenderness.  Genitourinary:  Nonpalpable bladder    Labs  Results for TYRON JONES (MRN [id number]) as of 7/12/2021 09:01   7/12/2021 07:18   Hematocrit 51.6   Testosterone Total 314     Results for TYRON JONES (MRN [id number]) as of 10/1/2020 12:45   10/1/2020 07:49   Prostate Specific Antigen 1.205   Testosterone Total 263   Hematocrit 52.1     Results for TYRON JONES (MRN [id number]) as of 12/30/2019 09:17   12/16/2019 08:47   Testosterone Total 323   Hematocrit 52.1     Results fr TYRON JONES (MRN [id number]) as of 5/30/2019 08:17   5/16/2019 08:48   Prostate Specific Antigen 0.885   Testosterone Total 338   Hematocrit 54.8 (H)     Results for TEJINDER JONES (MRN [id number]) as of 9/22/2016 09:15   3/7/2016 09:16   TESTOSTERONE,TOTAL 104.4 (L)     Testosterone Injection  Today the patient received an injection of testosterone undecanoate 750mg.  This was given in the gluteus.  The results of this injection: None  Patient was monitored for 30 minutes following the injection.    Assessment  Hypogonadism- continue Aveed q10 weeks  Patient is doing well  He does have a history of elevated hematocrit -he donates blood regularly    Plan  Labs q4 injections   Aveed 750mg IM every 10 weeks

## 2022-02-20 DIAGNOSIS — M96.1 FAILED BACK SYNDROME OF LUMBAR SPINE: ICD-10-CM

## 2022-02-20 DIAGNOSIS — M51.379 DEGENERATION OF LUMBAR OR LUMBOSACRAL INTERVERTEBRAL DISC: ICD-10-CM

## 2022-02-21 NOTE — TELEPHONE ENCOUNTER
[facility] sent Rx request for the following:   traMADol (ULTRAM) 50 MG tablet  Sig TAKE 1 TABLET BY MOUTH EVERY 6 HOURS AS NEEDED FOR SEVERE PAIN    Last Prescription Date:   01/19/2022  Last Fill Qty/Refills:         120, R-0    Last Office Visit:              08/30/2021 ([facility])   Future Office visit:           None noted    Routing refill request to provider for review/approval because:  Drug not on the [facility], [facility] or [facility] refill protocol or controlled substance    Unable to complete prescription refill per RN Medication Refill Policy.................... Arina Esquivel RN ....................  2/21/2022   10:31 AM

## 2022-02-22 RX ORDER — TRAMADOL HYDROCHLORIDE 50 MG/1
TABLET ORAL
Qty: 120 TABLET | Refills: 0 | Status: SHIPPED | OUTPATIENT
Start: 2022-02-22 | End: 2022-03-10

## 2022-02-22 NOTE — TELEPHONE ENCOUNTER
Patient returned call and was assisted to schedule appt. Neisha Paniagua LPN .......................2/22/2022  3:11 PM

## 2022-02-22 NOTE — TELEPHONE ENCOUNTER
Left message to call back. Dr Flor has requested a follow up visit before further refills of this medication. A 30 day supply was authorized.   Maisha Louise LPN on 2/22/2022 at 11:31 AM

## 2022-03-10 ENCOUNTER — OFFICE VISIT (OUTPATIENT)
Dept: FAMILY MEDICINE | Facility: OTHER | Age: 67
End: 2022-03-10
Attending: FAMILY MEDICINE
Payer: COMMERCIAL

## 2022-03-10 VITALS
BODY MASS INDEX: 24.63 KG/M2 | TEMPERATURE: 98 F | DIASTOLIC BLOOD PRESSURE: 72 MMHG | RESPIRATION RATE: 16 BRPM | OXYGEN SATURATION: 100 % | HEART RATE: 68 BPM | WEIGHT: 162 LBS | SYSTOLIC BLOOD PRESSURE: 132 MMHG

## 2022-03-10 DIAGNOSIS — M96.1 FAILED BACK SYNDROME OF LUMBAR SPINE: ICD-10-CM

## 2022-03-10 DIAGNOSIS — M51.379 DEGENERATION OF LUMBAR OR LUMBOSACRAL INTERVERTEBRAL DISC: Primary | ICD-10-CM

## 2022-03-10 PROCEDURE — G0463 HOSPITAL OUTPT CLINIC VISIT: HCPCS

## 2022-03-10 PROCEDURE — 99213 OFFICE O/P EST LOW 20 MIN: CPT | Performed by: FAMILY MEDICINE

## 2022-03-10 RX ORDER — TRAMADOL HYDROCHLORIDE 50 MG/1
TABLET ORAL
Qty: 60 TABLET | Refills: 5 | Status: SHIPPED | OUTPATIENT
Start: 2022-03-10 | End: 2022-09-20

## 2022-03-10 ASSESSMENT — PATIENT HEALTH QUESTIONNAIRE - PHQ9
SUM OF ALL RESPONSES TO PHQ QUESTIONS 1-9: 7
10. IF YOU CHECKED OFF ANY PROBLEMS, HOW DIFFICULT HAVE THESE PROBLEMS MADE IT FOR YOU TO DO YOUR WORK, TAKE CARE OF THINGS AT HOME, OR GET ALONG WITH OTHER PEOPLE: VERY DIFFICULT
SUM OF ALL RESPONSES TO PHQ QUESTIONS 1-9: 7

## 2022-03-10 ASSESSMENT — PAIN SCALES - GENERAL: PAINLEVEL: MODERATE PAIN (4)

## 2022-03-10 NOTE — NURSING NOTE
"Chief Complaint   Patient presents with     Recheck Medication       Initial /72   Pulse 68   Temp 98  F (36.7  C)   Resp 16   Wt 73.5 kg (162 lb)   SpO2 100%   BMI 24.63 kg/m   Estimated body mass index is 24.63 kg/m  as calculated from the following:    Height as of 8/30/21: 1.727 m (5' 8\").    Weight as of this encounter: 73.5 kg (162 lb).  Medication Reconciliation: complete.  FOOD SECURITY SCREENING QUESTIONS  Hunger Vital Signs:  Within the past 12 months we worried whether our food would run out before we got money to buy more. Never  Within the past 12 months the food we bought just didn't last and we didn't have money to get more. Never  Kisha Valdovinos LPN 3/10/2022 3:24 PM      Kisha Valdovinos LPN  "

## 2022-03-10 NOTE — PROGRESS NOTES
Assessment & Plan     (M51.37) Degeneration of lumbar or lumbosacral intervertebral disc  (primary encounter diagnosis)  Comment: he asked to drop the dose, sow ill do 2 daily as needed.  Follow up in 6 months on this.  Plan: traMADol (ULTRAM) 50 MG tablet             (M96.1) Failed back syndrome of lumbar spine  Comment:    Plan: traMADol (ULTRAM) 50 MG tablet             I offered a work up for the fatigue, but I suspect it was his depression/SAD. He wants to observe for now since feeling better in the last few weeks.                    No follow-ups on file.    Andres Flor MD  [facility]    Subjective   Solo is a 66 year old who presents for the following health issues     History of Present Illness       Back Pain:  He presents for follow up of back pain. Patient's back pain is a chronic problem.  Location of back pain:  Right lower back, left lower back, right shoulder and left shoulder  Description of back pain: dull ache  Back pain spreads: right buttocks, left buttocks, right shoulder and left shoulder    Since patient first noticed back pain, pain is: gradually worsening  Does back pain interfere with his job:  Not applicable      Mental Health Follow-up:                    Today's PHQ-9         PHQ-9 Total Score: 7  PHQ-9 Q9 Thoughts of better off dead/self-harm past 2 weeks :   (P) Not at all    How difficult have these problems made it for you to do your work, take care of things at home, or get along with other people: Very difficult        Reason for visit:  Medicine follow up  and extremely tired  Symptom onset:  More than a month  Symptoms include:  Muscle pain  Symptom intensity:  Moderate  Symptom progression:  Staying the same  Had these symptoms before:  Yes  Has tried/received treatment for these symptoms:  Yes  Previous treatment was successful:  No  What makes it worse:  More activity  What makes it better:  No    He eats 2-3 servings of fruits and vegetables daily.He  consumes 0 sweetened beverage(s) daily.He exercises with enough effort to increase his heart rate 30 to 60 minutes per day.         Medication Followup of     Taking Medication as prescribed: yes    Side Effects:  None    Medication Helping Symptoms:  yes     Pain History:  When did you first notice your pain? - More than 6 weeks   Have you seen this provider for your pain in the past?   Yes   Where in your body do you have pain? Back mostly  Are you seeing anyone else for your pain? No    PHQ-9 SCORE 5/16/2019 10/9/2020 3/10/2022   PHQ-9 Total Score MyChart - - 7 (Mild depression)   PHQ-9 Total Score 0 7 7       BLAKE-7 SCORE 6/29/2021 8/20/2021 8/30/2021   Total Score 0 0 0               Chronic Pain Follow Up:    Location of pain: back  Analgesia/pain control:    - Recent changes:  none    - Overall control: Tolerable with discomfort    - Current treatments: tramadol   Adherence:     - Do you ever take more pain medicine than prescribed? No    - When did you take your last dose of pain medicine?      Adverse effects: No   PDMP Review       Value Time User    State PDMP site checked  Yes 2/22/2022  8:28 AM Andres Flor MD        Last CSA Agreement:   CSA -- Patient Level:    Controlled Substance Agreement - Opioid - Scan on 6/29/2021  1:13 PM       Last UDS: 7/2/2021          Review of Systems significant fatigue all winter.  Often would nap, very low motivation.  No blood in stool, has not had colon cancer screening. Fatigue is improved in the last 2-3 weeks.           Objective    /72   Pulse 68   Temp 98  F (36.7  C)   Resp 16   Wt 73.5 kg (162 lb)   SpO2 100%   BMI 24.63 kg/m    Body mass index is 24.63 kg/m .  Physical Exam  Constitutional:       Appearance: Normal appearance.   Neurological:      General: No focal deficit present.      Mental Status: He is alert and oriented to person, place, and time.   Psychiatric:         Mood and Affect: Mood normal.         Behavior: Behavior normal.          Thought Content: Thought content normal.

## 2022-03-11 ASSESSMENT — PATIENT HEALTH QUESTIONNAIRE - PHQ9: SUM OF ALL RESPONSES TO PHQ QUESTIONS 1-9: 7

## 2022-03-15 NOTE — NURSING NOTE
"Pt presents to clinic for Aveed injection    Review of Systems:    Weight loss:    No     Recent fever/chills:  No   Night sweats:   No  Current skin rash:  No   Recent hair loss:  No  Heat intolerance:  No   Cold intolerance:  No  Chest pain:   No   Palpitations:   No  Shortness of breath:  No   Wheezing:   No  Constipation:    No   Diarrhea:   No   Nausea:   No   Vomiting:   No   Kidney/side pain:  No   Back pain:   Yes  Frequent headaches:  No   Dizziness:     No  Leg swelling:   No   Calf pain:    Yes    Patient given \"Aveed Treatment: A Patient Guide\" form.  Injection given and patient in clinic for 30 minutes after injection for monitoring.  Patient tolerated injection with no problems.  Batsheva Sheriff LPN .........4/29/2021...9:19 AM    " Patient unable to complete

## 2022-04-18 ENCOUNTER — OFFICE VISIT (OUTPATIENT)
Dept: UROLOGY | Facility: OTHER | Age: 67
End: 2022-04-18
Attending: UROLOGY
Payer: MEDICARE

## 2022-04-18 VITALS
WEIGHT: 159.4 LBS | HEART RATE: 89 BPM | RESPIRATION RATE: 16 BRPM | DIASTOLIC BLOOD PRESSURE: 70 MMHG | BODY MASS INDEX: 24.24 KG/M2 | OXYGEN SATURATION: 95 % | SYSTOLIC BLOOD PRESSURE: 130 MMHG

## 2022-04-18 DIAGNOSIS — E29.1 HYPOGONADISM IN MALE: Primary | ICD-10-CM

## 2022-04-18 DIAGNOSIS — R39.12 WEAK URINARY STREAM: ICD-10-CM

## 2022-04-18 PROCEDURE — 250N000011 HC RX IP 250 OP 636: Performed by: UROLOGY

## 2022-04-18 PROCEDURE — 96372 THER/PROPH/DIAG INJ SC/IM: CPT | Performed by: UROLOGY

## 2022-04-18 PROCEDURE — 99213 OFFICE O/P EST LOW 20 MIN: CPT | Performed by: UROLOGY

## 2022-04-18 PROCEDURE — G0463 HOSPITAL OUTPT CLINIC VISIT: HCPCS | Mod: 25 | Performed by: UROLOGY

## 2022-04-18 RX ADMIN — TESTOSTERONE UNDECANOATE 750 MG: 250 INJECTION INTRAMUSCULAR at 09:05

## 2022-04-18 ASSESSMENT — PAIN SCALES - GENERAL: PAINLEVEL: MILD PAIN (3)

## 2022-04-18 NOTE — PROGRESS NOTES
Type of Visit  EST    Chief Complaint  Hypogonadism    HPI  Mr. Freedman is a 66 y.o. male who follows up with symptomatic hypogonadism.  Primary symptoms include low energy and poor sleep.  He has been on testosterone replacement for over 7 years.  He previously failed patches and short acting injections.  He follows up today as he receives AVEED every 10 weeks.  He denies side effects.  Symptoms are at goal and he continues to do well.  Labs were last collected last summer.  He is due for labs again at the next injection.    He continues to donate blood on a routine basis as he does have a history of a mildly elevated hematocrit with testosterone supplementation.  He last donated in January about 3 months ago.      Review of Systems  I reviewed the ROS with the patient today.    Nursing Notes:   Batsheva Sheriff LPN  4/18/2022  8:25 AM  Addendum  Pt presents to clinic for Aveed injection    Review of Systems:    Weight loss:    No     Recent fever/chills:  No   Night sweats:   No  Current skin rash:  No   Recent hair loss:  No  Heat intolerance:  No   Cold intolerance:  No  Chest pain:   No   Palpitations:   No  Shortness of breath:  No   Wheezing:   No  Constipation:    No   Diarrhea:   No   Nausea:   No   Vomiting:   No   Kidney/side pain:  No   Back pain:   Yes  Frequent headaches:  No   Dizziness:     No  Leg swelling:   No   Calf pain:    Yes        Physical Exam  /70 (BP Location: Right arm, Patient Position: Sitting, Cuff Size: Adult Regular)   Pulse 89   Resp 16   Wt 72.3 kg (159 lb 6.4 oz)   SpO2 95%   BMI 24.24 kg/m    Constitutional: No acute distress.  Alert and cooperative   Head: NCAT  Eyes: Conjunctivae normal  Cardiovascular: Regular rate.  Pulmonary/Chest: Respirations are even and non-labored bilaterally, no audible wheezing  Abdominal: Soft. No distension, tenderness, masses or guarding.   Extremities: GILDARDO x 4, Warm. No clubbing.  No cyanosis.    Skin: Pink, warm and dry.  No  visible rashes noted.  Back:  No left CVA tenderness.  No right CVA tenderness.  Genitourinary:  Nonpalpable bladder    Labs  Results for TYRON JONES (MRN [id number]) as of 7/12/2021 09:01   7/12/2021 07:18   Hematocrit 51.6   Testosterone Total 314     Results for TYRON JONES (MRN [id number]) as of 10/1/2020 12:45   10/1/2020 07:49   Prostate Specific Antigen 1.205   Testosterone Total 263   Hematocrit 52.1     Results for TYRON JONES (MRN [id number]) as of 12/30/2019 09:17   12/16/2019 08:47   Testosterone Total 323   Hematocrit 52.1     Results fr TYRON JONES (MRN [id number]) as of 5/30/2019 08:17   5/16/2019 08:48   Prostate Specific Antigen 0.885   Testosterone Total 338   Hematocrit 54.8 (H)     Results for TEJINDER JONES (MRN [id number]) as of 9/22/2016 09:15   3/7/2016 09:16   TESTOSTERONE,TOTAL 104.4 (L)     Testosterone Injection  Today the patient received an injection of testosterone undecanoate 750mg.  This was given in the gluteus.  The results of this injection: None  Patient was monitored for 30 minutes following the injection.    Assessment  Hypogonadism- continue Aveed q10 weeks  He does have a history of elevated hematocrit -he donates blood regularly  Patient is due for labs at the next dose    Plan  Labs q4 injections   Aveed 750mg IM every 10 weeks (labs collected with the next visit-PSA, hematocrit and testosterone)

## 2022-06-27 ENCOUNTER — LAB (OUTPATIENT)
Dept: LAB | Facility: OTHER | Age: 67
End: 2022-06-27
Attending: UROLOGY
Payer: MEDICARE

## 2022-06-27 ENCOUNTER — OFFICE VISIT (OUTPATIENT)
Dept: UROLOGY | Facility: OTHER | Age: 67
End: 2022-06-27
Attending: UROLOGY
Payer: MEDICARE

## 2022-06-27 VITALS
HEART RATE: 50 BPM | DIASTOLIC BLOOD PRESSURE: 72 MMHG | SYSTOLIC BLOOD PRESSURE: 128 MMHG | WEIGHT: 155 LBS | BODY MASS INDEX: 23.57 KG/M2 | RESPIRATION RATE: 16 BRPM | OXYGEN SATURATION: 98 %

## 2022-06-27 DIAGNOSIS — E29.1 HYPOGONADISM IN MALE: Primary | ICD-10-CM

## 2022-06-27 DIAGNOSIS — R39.12 WEAK URINARY STREAM: ICD-10-CM

## 2022-06-27 DIAGNOSIS — E29.1 HYPOGONADISM IN MALE: ICD-10-CM

## 2022-06-27 LAB
HCT VFR BLD AUTO: 50.2 % (ref 40–53)
PSA SERPL-MCNC: 1.29 UG/L (ref 0–4)

## 2022-06-27 PROCEDURE — 250N000011 HC RX IP 250 OP 636: Performed by: UROLOGY

## 2022-06-27 PROCEDURE — 99213 OFFICE O/P EST LOW 20 MIN: CPT | Performed by: UROLOGY

## 2022-06-27 PROCEDURE — 84153 ASSAY OF PSA TOTAL: CPT | Mod: ZL

## 2022-06-27 PROCEDURE — 36415 COLL VENOUS BLD VENIPUNCTURE: CPT | Mod: ZL

## 2022-06-27 PROCEDURE — G0463 HOSPITAL OUTPT CLINIC VISIT: HCPCS | Mod: 25

## 2022-06-27 PROCEDURE — 96372 THER/PROPH/DIAG INJ SC/IM: CPT | Performed by: UROLOGY

## 2022-06-27 PROCEDURE — 84403 ASSAY OF TOTAL TESTOSTERONE: CPT | Mod: ZL

## 2022-06-27 PROCEDURE — 85014 HEMATOCRIT: CPT | Mod: ZL

## 2022-06-27 RX ADMIN — TESTOSTERONE UNDECANOATE 750 MG: 250 INJECTION INTRAMUSCULAR at 10:57

## 2022-06-27 ASSESSMENT — PAIN SCALES - GENERAL: PAINLEVEL: MILD PAIN (3)

## 2022-06-27 NOTE — NURSING NOTE
Chief Complaint   Patient presents with     Procedure     10 week aveed     Patient presents to the clinic today for a procedure for an Aveed injection.    Review of Systems:    Weight loss:    No     Recent fever/chills:  No   Night sweats:   No  Current skin rash:  No   Recent hair loss:  No  Heat intolerance:  No   Cold intolerance:  No  Chest pain:   No   Palpitations:   No  Shortness of breath:  No   Wheezing:   No  Constipation:    No   Diarrhea:   No   Nausea:   No   Vomiting:   No   Kidney/side pain:  No   Back pain:   Yes  Frequent headaches:  No   Dizziness:     No  Leg swelling:   No   Calf pain:    No        Medication Reconciliation: completed   Shelby Francis LPN  6/27/2022 9:10 AM

## 2022-06-27 NOTE — PROGRESS NOTES
Type of Visit  EST    Chief Complaint  Hypogonadism    HPI  Mr. Freedman is a 66 y.o. male who follows up with symptomatic hypogonadism.  Primary symptoms include low energy and poor sleep.  He follows up today as he receives AVEED every 10 weeks.  He denies side effects.  Symptoms are at goal and he continues to do well.  Labs were last collected last summer.  He is due for labs again at the next injection.    He continues to donate blood on a routine basis as he does have a history of a mildly elevated hematocrit with testosterone supplementation.  He last donated in January about 5 months ago and earlier this month.    He underwent labs earlier this morning.  He presents today to undergo a dose of Aveed as well as review his labs.    Hypogonadism history  Patient started testosterone supplementation around 2015.  He has failed topical patches and short acting injections.      Review of Systems  I reviewed the ROS with the patient today.    Nursing Notes:   Shelby Francis LPN  6/27/2022  9:24 AM  Addendum  Chief Complaint   Patient presents with     Procedure     10 week aveed     Patient presents to the clinic today for a procedure for an Aveed injection.    Review of Systems:    Weight loss:    No     Recent fever/chills:  No   Night sweats:   No  Current skin rash:  No   Recent hair loss:  No  Heat intolerance:  No   Cold intolerance:  No  Chest pain:   No   Palpitations:   No  Shortness of breath:  No   Wheezing:   No  Constipation:    No   Diarrhea:   No   Nausea:   No   Vomiting:   No   Kidney/side pain:  No   Back pain:   Yes  Frequent headaches:  No   Dizziness:     No  Leg swelling:   No   Calf pain:    No        Medication Reconciliation: completed   Shelby Francis LPN  6/27/2022 9:10 AM        Physical Exam  /72 (BP Location: Right arm, Patient Position: Sitting, Cuff Size: Adult Regular)   Pulse 50   Resp 16   Wt 70.3 kg (155 lb)   SpO2 98%   BMI 23.57 kg/m    Constitutional: No acute distress.   Alert and cooperative   Head: NCAT  Eyes: Conjunctivae normal  Cardiovascular: Regular rate.  Pulmonary/Chest: Respirations are even and non-labored bilaterally, no audible wheezing  Abdominal: Soft. No distension, tenderness, masses or guarding.   Extremities: GILDARDO x 4, Warm. No clubbing.  No cyanosis.    Skin: Pink, warm and dry.  No visible rashes noted.  Back:  No left CVA tenderness.  No right CVA tenderness.  Genitourinary:  Nonpalpable bladder    Labs  T pending     06/27/22 07:14   PSA 1.29   Hematocrit 50.2     Results for TYRON JONES (MRN [id number]) as of 7/12/2021 09:01   7/12/2021 07:18   Hematocrit 51.6   Testosterone Total 314     Results for TYRON JONES (MRN [id number]) as of 10/1/2020 12:45   10/1/2020 07:49   Prostate Specific Antigen 1.205   Testosterone Total 263   Hematocrit 52.1     Results for TYRON JONES (MRN [id number]) as of 12/30/2019 09:17   12/16/2019 08:47   Testosterone Total 323   Hematocrit 52.1     Results fr TYRON JONES (MRN [id number]) as of 5/30/2019 08:17   5/16/2019 08:48   Prostate Specific Antigen 0.885   Testosterone Total 338   Hematocrit 54.8 (H)     Results for TEJINDER JONES (MRN [id number]) as of 9/22/2016 09:15   3/7/2016 09:16   TESTOSTERONE,TOTAL 104.4 (L)     Testosterone Injection  Today the patient received an injection of testosterone undecanoate 750mg.  This was given in the gluteus.  The results of this injection: None  Patient was monitored for 30 minutes following the injection.    Assessment  Hypogonadism- continue Aveed q10 weeks  He does have a history of elevated hematocrit -he donates blood regularly - hematocrit WNL    Plan  Labs q4 injections   Aveed 750mg IM every 10 weeks (labs collected with the next visit-PSA, hematocrit and testosterone)

## 2022-06-29 LAB — TESTOST SERPL-MCNC: 411 NG/DL (ref 240–950)

## 2022-07-13 DIAGNOSIS — I10 ESSENTIAL HYPERTENSION: ICD-10-CM

## 2022-07-13 NOTE — TELEPHONE ENCOUNTER
[id number] sent Rx request for the following:      Requested Prescriptions   Pending Prescriptions Disp Refills     lisinopril-hydrochlorothiazide (ZESTORETIC) 10-12.5 MG tablet [Pharmacy Med Name: Lisinopril-hydroCHLOROthiazide 10-12.5 MG Oral Tablet] 90 tablet 0     Sig: Take 1 tablet by mouth once daily       Diuretics (Including Combos) Protocol Failed - 7/13/2022  3:28 PM        Failed - Normal serum creatinine on file in past 12 months     Recent Labs   Lab Test 10/09/20  1327   CR 1.06           Failed - Normal serum potassium on file in past 12 months     Recent Labs   Lab Test 10/09/20  1327   POTASSIUM 4.1           Failed - Normal serum sodium on file in past 12 months     Recent Labs   Lab Test 10/09/20  1327          ACE Inhibitors (Including Combos) Protocol Failed - 7/13/2022  3:28 PM        Failed - Normal serum creatinine on file in past 12 months     Recent Labs   Lab Test 10/09/20  1327   CR 1.06           Failed - Normal serum potassium on file in past 12 months     Recent Labs   Lab Test 10/09/20  1327   POTASSIUM 4.1        Last Prescription Date:   10/22/21  Last Fill Qty/Refills:         90, R-2    Last Office Visit:              3/10/22   Future Office visit:           None    Andra Morataya RN .............. 7/13/2022  3:34 PM

## 2022-07-14 RX ORDER — LISINOPRIL/HYDROCHLOROTHIAZIDE 10-12.5 MG
TABLET ORAL
Qty: 90 TABLET | Refills: 4 | Status: SHIPPED | OUTPATIENT
Start: 2022-07-14 | End: 2023-09-14

## 2022-09-08 ENCOUNTER — TELEPHONE (OUTPATIENT)
Dept: FAMILY MEDICINE | Facility: OTHER | Age: 67
End: 2022-09-08

## 2022-09-08 NOTE — TELEPHONE ENCOUNTER
Please call back has questions regarding dosage of a med.   Lea Faulkner on 9/8/2022 at 12:21 PM

## 2022-09-09 NOTE — TELEPHONE ENCOUNTER
Spoke with the pharmacist his last fill of the Tramadol was #90 instead of #60. Patient returned the extra 30 to the pharmacy and they were destroyed. Neisha Paniagua LPN .......................9/9/2022  11:42 AM

## 2022-09-12 ENCOUNTER — OFFICE VISIT (OUTPATIENT)
Dept: UROLOGY | Facility: OTHER | Age: 67
End: 2022-09-12
Attending: UROLOGY
Payer: MEDICARE

## 2022-09-12 VITALS
WEIGHT: 154.6 LBS | SYSTOLIC BLOOD PRESSURE: 126 MMHG | HEART RATE: 68 BPM | BODY MASS INDEX: 23.51 KG/M2 | DIASTOLIC BLOOD PRESSURE: 78 MMHG | RESPIRATION RATE: 16 BRPM | OXYGEN SATURATION: 98 %

## 2022-09-12 DIAGNOSIS — E29.1 HYPOGONADISM IN MALE: Primary | ICD-10-CM

## 2022-09-12 PROCEDURE — 99213 OFFICE O/P EST LOW 20 MIN: CPT | Performed by: UROLOGY

## 2022-09-12 PROCEDURE — 96372 THER/PROPH/DIAG INJ SC/IM: CPT | Performed by: UROLOGY

## 2022-09-12 PROCEDURE — 250N000011 HC RX IP 250 OP 636: Performed by: UROLOGY

## 2022-09-12 PROCEDURE — G0463 HOSPITAL OUTPT CLINIC VISIT: HCPCS | Mod: 25

## 2022-09-12 RX ADMIN — TESTOSTERONE UNDECANOATE 750 MG: 250 INJECTION INTRAMUSCULAR at 08:36

## 2022-09-12 ASSESSMENT — PAIN SCALES - GENERAL: PAINLEVEL: MILD PAIN (3)

## 2022-09-12 NOTE — PROGRESS NOTES
"Type of Visit  EST    Chief Complaint  Hypogonadism    HPI  Mr. Freedman is a 67 y.o. male who follows up with symptomatic hypogonadism.  Primary symptoms include low energy and poor sleep.  He follows up today as he receives AVEED every 10 weeks.  He denies side effects.  Symptoms are at goal and he continues to do well.  Labs were collected at the last visit and were pending at the time.  Today he follows up for treatment dose and to review labs from the last visit.  His symptoms continue to be at goal.    He continues to donate blood on a routine basis as he does have a history of a mildly elevated hematocrit with testosterone supplementation.  He donated 3 times this year.  Hematocrit at the last check was within normal limits    Hypogonadism history  Patient started testosterone supplementation around 2015.  He has failed topical patches and short acting injections.      Review of Systems  I reviewed the ROS with the patient today.    Nursing Notes:   Batsheva Sheriff LPN  9/12/2022  8:37 AM  Addendum  Pt presents to clinic for Aveed injection  Review of Systems:    Weight loss:    No     Recent fever/chills:  No   Night sweats:   No  Current skin rash:  No   Recent hair loss:  No  Heat intolerance:  No   Cold intolerance:  No  Chest pain:   No   Palpitations:   No  Shortness of breath:  No   Wheezing:   No  Constipation:    No   Diarrhea:   No   Nausea:   No   Vomiting:   No   Kidney/side pain:  No   Back pain:   Yes  Frequent headaches:  No   Dizziness:     No  Leg swelling:   No   Calf pain:    Yes    Patient given \"Aveed Treatment: A Patient Guide\" form.  Injection given and patient in clinic for 30 minutes after injection for monitoring.  Patient tolerated injection with no problems.  Batsheva Sheriff LPN .........9/12/2022...8:37 AM        Physical Exam  /78 (BP Location: Left arm, Patient Position: Sitting, Cuff Size: Adult Regular)   Pulse 68   Resp 16   Wt 70.1 kg (154 lb 9.6 oz)   SpO2 98% "   BMI 23.51 kg/m    Constitutional: No acute distress.  Alert and cooperative   Head: NCAT  Eyes: Conjunctivae normal  Cardiovascular: Regular rate.  Pulmonary/Chest: Respirations are even and non-labored bilaterally, no audible wheezing  Abdominal: Soft. No distension, tenderness, masses or guarding.   Extremities: GILDARDO x 4, Warm. No clubbing.  No cyanosis.    Skin: Pink, warm and dry.  No visible rashes noted.  Back:  No left CVA tenderness.  No right CVA tenderness.  Genitourinary:  Nonpalpable bladder    Labs   06/27/22 07:14 06/27/22 09:17   PSA 1.29    Testosterone Total  411   Hematocrit 50.2      Results for TYRON JONES (MRN [id number]) as of 7/12/2021 09:01   7/12/2021 07:18   Hematocrit 51.6   Testosterone Total 314     Results for TYRON JONES (MRN [id number]) as of 10/1/2020 12:45   10/1/2020 07:49   Prostate Specific Antigen 1.205   Testosterone Total 263   Hematocrit 52.1     Results for TYRON JONES (MRN [id number]) as of 12/30/2019 09:17   12/16/2019 08:47   Testosterone Total 323   Hematocrit 52.1     Results fr TYRON JONES (MRN [id number]) as of 5/30/2019 08:17   5/16/2019 08:48   Prostate Specific Antigen 0.885   Testosterone Total 338   Hematocrit 54.8 (H)     Results for TEJINDER JONES (MRN [id number]) as of 9/22/2016 09:15   3/7/2016 09:16   TESTOSTERONE,TOTAL 104.4 (L)     Testosterone Injection  Today the patient received an injection of testosterone undecanoate 750mg.  This was given in the gluteus.  The results of this injection: None  Patient was monitored for 30 minutes following the injection.    Assessment  Hypogonadism- continue Aveed q10 weeks  He does have a history of elevated hematocrit -he donates blood regularly - hematocrit WNL    Plan  Labs q4 injections   Aveed 750mg IM every 10 weeks (labs collected with the next visit-PSA, hematocrit and testosterone)

## 2022-09-12 NOTE — NURSING NOTE
"Pt presents to clinic for Aveed injection  Review of Systems:    Weight loss:    No     Recent fever/chills:  No   Night sweats:   No  Current skin rash:  No   Recent hair loss:  No  Heat intolerance:  No   Cold intolerance:  No  Chest pain:   No   Palpitations:   No  Shortness of breath:  No   Wheezing:   No  Constipation:    No   Diarrhea:   No   Nausea:   No   Vomiting:   No   Kidney/side pain:  No   Back pain:   Yes  Frequent headaches:  No   Dizziness:     No  Leg swelling:   No   Calf pain:    Yes    Patient given \"Aveed Treatment: A Patient Guide\" form.  Injection given and patient in clinic for 30 minutes after injection for monitoring.  Patient tolerated injection with no problems.  Batsheva Sheriff LPN .........9/12/2022...8:37 AM      "

## 2022-09-19 DIAGNOSIS — M51.379 DEGENERATION OF LUMBAR OR LUMBOSACRAL INTERVERTEBRAL DISC: ICD-10-CM

## 2022-09-19 DIAGNOSIS — M96.1 FAILED BACK SYNDROME OF LUMBAR SPINE: ICD-10-CM

## 2022-09-20 ENCOUNTER — TELEPHONE (OUTPATIENT)
Dept: FAMILY MEDICINE | Facility: OTHER | Age: 67
End: 2022-09-20

## 2022-09-20 RX ORDER — TRAMADOL HYDROCHLORIDE 50 MG/1
TABLET ORAL
Qty: 60 TABLET | Refills: 0 | Status: SHIPPED | OUTPATIENT
Start: 2022-09-20 | End: 2022-10-21

## 2022-09-20 NOTE — TELEPHONE ENCOUNTER
[facility][id number] sent Rx request for the following:      Requested Prescriptions   Pending Prescriptions Disp Refills     traMADol (ULTRAM) 50 MG tablet [Pharmacy Med Name: traMADol HCl 50 MG Oral Tablet] 60 tablet 0     Sig: TAKE 1 TABLET BY MOUTH EVERY 6 HOURS AS NEEDED FOR SEVERE PAIN   Last Prescription Date:   3/10/22  Last Fill Qty/Refills:         60, R-5    Last Office Visit:              3/10/22   Future Office visit:           None    Per LOV note:  (M51.37) Degeneration of lumbar or lumbosacral intervertebral disc  (primary encounter diagnosis)  Comment: he asked to drop the dose, sow ill do 2 daily as needed.  Follow up in 6 months on this.  Plan: traMADol (ULTRAM) 50 MG tablet    Pt due for follow up. Routing to provider for refill consideration. Routing to  OUTREACH APPT REQUESTS pool, to assist Pt in scheduling appointment.     Andra Morataya RN .............. 9/20/2022  11:19 AM

## 2022-10-13 DIAGNOSIS — F33.0 DEPRESSION, MAJOR, RECURRENT, MILD (H): ICD-10-CM

## 2022-10-13 NOTE — TELEPHONE ENCOUNTER
"[facility][id number] [facility] [place] sent Rx request for the following:      Requested Prescriptions   Pending Prescriptions Disp Refills     sertraline (ZOLOFT) 50 MG tablet [Pharmacy Med Name: Sertraline HCl 50 MG Oral Tablet] 90 tablet 0     Sig: Take 1 tablet by mouth once daily       SSRIs Protocol Failed - 10/13/2022  3:08 PM        Failed - PHQ-9 score less than 5 in past 6 months     Please review last PHQ-9 score.           Passed - Medication is active on med list        Passed - Patient is age 18 or older        Passed - Recent (6 mo) or future (30 days) visit within the authorizing provider's specialty     Patient had office visit in the last 6 months or has a visit in the next 30 days with authorizing provider or within the authorizing provider's specialty.  See \"Patient Info\" tab in inbasket, or \"Choose Columns\" in Meds & Orders section of the refill encounter.                  Last Prescription Date:   10/22/2021  Last Fill Qty/Refills:         90, R-3    Last Office Visit:              03/10/2022  Future Office visit:             Next 5 appointments (look out 90 days)    Oct 21, 2022  8:40 AM  SHORT with Andres Flor MD  [facility] ([facility] [address]  [place] [address]  [phone]   Nov 21, 2022  8:30 AM  Return Visit with Peter uTrner MD  [facility] ([facility] [address]  [place] [address]  [phone]        Karlos Freitas RN  ....................  10/13/2022   3:19 PM      "

## 2022-10-21 ENCOUNTER — OFFICE VISIT (OUTPATIENT)
Dept: FAMILY MEDICINE | Facility: OTHER | Age: 67
End: 2022-10-21
Attending: FAMILY MEDICINE
Payer: MEDICARE

## 2022-10-21 VITALS
BODY MASS INDEX: 23.45 KG/M2 | HEART RATE: 69 BPM | SYSTOLIC BLOOD PRESSURE: 128 MMHG | TEMPERATURE: 97.3 F | OXYGEN SATURATION: 99 % | WEIGHT: 154.2 LBS | DIASTOLIC BLOOD PRESSURE: 80 MMHG | RESPIRATION RATE: 16 BRPM

## 2022-10-21 DIAGNOSIS — Z12.11 COLON CANCER SCREENING: ICD-10-CM

## 2022-10-21 DIAGNOSIS — M96.1 FAILED BACK SYNDROME OF LUMBAR SPINE: ICD-10-CM

## 2022-10-21 DIAGNOSIS — M51.379 DEGENERATION OF LUMBAR OR LUMBOSACRAL INTERVERTEBRAL DISC: ICD-10-CM

## 2022-10-21 DIAGNOSIS — F33.0 DEPRESSION, MAJOR, RECURRENT, MILD (H): ICD-10-CM

## 2022-10-21 DIAGNOSIS — I10 ESSENTIAL HYPERTENSION: ICD-10-CM

## 2022-10-21 DIAGNOSIS — E03.8 OTHER SPECIFIED HYPOTHYROIDISM: Primary | ICD-10-CM

## 2022-10-21 LAB
AMPHETAMINES UR QL: NOT DETECTED
ANION GAP SERPL CALCULATED.3IONS-SCNC: 5 MMOL/L (ref 3–14)
BARBITURATES UR QL SCN: NOT DETECTED
BENZODIAZ UR QL SCN: NOT DETECTED
BUN SERPL-MCNC: 25 MG/DL (ref 7–25)
BUPRENORPHINE UR QL: NOT DETECTED
CALCIUM SERPL-MCNC: 10 MG/DL (ref 8.6–10.3)
CANNABINOIDS UR QL: ABNORMAL
CHLORIDE BLD-SCNC: 100 MMOL/L (ref 98–107)
CO2 SERPL-SCNC: 31 MMOL/L (ref 21–31)
COCAINE UR QL SCN: NOT DETECTED
CREAT SERPL-MCNC: 1.17 MG/DL (ref 0.7–1.3)
D-METHAMPHET UR QL: NOT DETECTED
GFR SERPL CREATININE-BSD FRML MDRD: 67 ML/MIN/1.73M2
GLUCOSE BLD-MCNC: 95 MG/DL (ref 70–105)
MAGNESIUM SERPL-MCNC: 2.1 MG/DL (ref 1.9–2.7)
METHADONE UR QL SCN: NOT DETECTED
OPIATES UR QL SCN: NOT DETECTED
OXYCODONE UR QL SCN: NOT DETECTED
PCP UR QL SCN: NOT DETECTED
POTASSIUM BLD-SCNC: 4.3 MMOL/L (ref 3.5–5.1)
PROPOXYPH UR QL: NOT DETECTED
SODIUM SERPL-SCNC: 136 MMOL/L (ref 134–144)
TRICYCLICS UR QL SCN: NOT DETECTED
TSH SERPL DL<=0.005 MIU/L-ACNC: 2.11 MU/L (ref 0.4–4)

## 2022-10-21 PROCEDURE — 80306 DRUG TEST PRSMV INSTRMNT: CPT | Mod: ZL | Performed by: FAMILY MEDICINE

## 2022-10-21 PROCEDURE — 84443 ASSAY THYROID STIM HORMONE: CPT | Mod: ZL | Performed by: FAMILY MEDICINE

## 2022-10-21 PROCEDURE — 99214 OFFICE O/P EST MOD 30 MIN: CPT | Performed by: FAMILY MEDICINE

## 2022-10-21 PROCEDURE — 91312 COVID-19,PF,PFIZER BOOSTER BIVALENT: CPT

## 2022-10-21 PROCEDURE — 36415 COLL VENOUS BLD VENIPUNCTURE: CPT | Mod: ZL | Performed by: FAMILY MEDICINE

## 2022-10-21 PROCEDURE — G0008 ADMIN INFLUENZA VIRUS VAC: HCPCS

## 2022-10-21 PROCEDURE — 80048 BASIC METABOLIC PNL TOTAL CA: CPT | Mod: ZL | Performed by: FAMILY MEDICINE

## 2022-10-21 PROCEDURE — 83735 ASSAY OF MAGNESIUM: CPT | Mod: ZL | Performed by: FAMILY MEDICINE

## 2022-10-21 PROCEDURE — 0124A COVID-19,PF,PFIZER BOOSTER BIVALENT: CPT

## 2022-10-21 PROCEDURE — G0463 HOSPITAL OUTPT CLINIC VISIT: HCPCS

## 2022-10-21 PROCEDURE — G0463 HOSPITAL OUTPT CLINIC VISIT: HCPCS | Mod: 25

## 2022-10-21 RX ORDER — TRAMADOL HYDROCHLORIDE 50 MG/1
50 TABLET ORAL EVERY 6 HOURS PRN
Qty: 60 TABLET | Refills: 5 | Status: SHIPPED | OUTPATIENT
Start: 2022-10-21 | End: 2023-01-30

## 2022-10-21 RX ORDER — SERTRALINE HYDROCHLORIDE 100 MG/1
100 TABLET, FILM COATED ORAL DAILY
Qty: 90 TABLET | Refills: 4 | Status: SHIPPED | OUTPATIENT
Start: 2022-10-21 | End: 2023-09-01

## 2022-10-21 RX ORDER — LEVOTHYROXINE SODIUM 50 UG/1
50 TABLET ORAL DAILY
Qty: 90 TABLET | Refills: 4 | Status: SHIPPED | OUTPATIENT
Start: 2022-10-21 | End: 2023-12-05

## 2022-10-21 ASSESSMENT — ANXIETY QUESTIONNAIRES
2. NOT BEING ABLE TO STOP OR CONTROL WORRYING: SEVERAL DAYS
8. IF YOU CHECKED OFF ANY PROBLEMS, HOW DIFFICULT HAVE THESE MADE IT FOR YOU TO DO YOUR WORK, TAKE CARE OF THINGS AT HOME, OR GET ALONG WITH OTHER PEOPLE?: SOMEWHAT DIFFICULT
5. BEING SO RESTLESS THAT IT IS HARD TO SIT STILL: SEVERAL DAYS
7. FEELING AFRAID AS IF SOMETHING AWFUL MIGHT HAPPEN: NOT AT ALL
GAD7 TOTAL SCORE: 8
3. WORRYING TOO MUCH ABOUT DIFFERENT THINGS: SEVERAL DAYS
6. BECOMING EASILY ANNOYED OR IRRITABLE: MORE THAN HALF THE DAYS
IF YOU CHECKED OFF ANY PROBLEMS ON THIS QUESTIONNAIRE, HOW DIFFICULT HAVE THESE PROBLEMS MADE IT FOR YOU TO DO YOUR WORK, TAKE CARE OF THINGS AT HOME, OR GET ALONG WITH OTHER PEOPLE: SOMEWHAT DIFFICULT
GAD7 TOTAL SCORE: 8
4. TROUBLE RELAXING: SEVERAL DAYS
1. FEELING NERVOUS, ANXIOUS, OR ON EDGE: MORE THAN HALF THE DAYS
GAD7 TOTAL SCORE: 8
7. FEELING AFRAID AS IF SOMETHING AWFUL MIGHT HAPPEN: NOT AT ALL

## 2022-10-21 ASSESSMENT — PATIENT HEALTH QUESTIONNAIRE - PHQ9
10. IF YOU CHECKED OFF ANY PROBLEMS, HOW DIFFICULT HAVE THESE PROBLEMS MADE IT FOR YOU TO DO YOUR WORK, TAKE CARE OF THINGS AT HOME, OR GET ALONG WITH OTHER PEOPLE: SOMEWHAT DIFFICULT
SUM OF ALL RESPONSES TO PHQ QUESTIONS 1-9: 5
SUM OF ALL RESPONSES TO PHQ QUESTIONS 1-9: 5

## 2022-10-21 ASSESSMENT — PAIN SCALES - GENERAL: PAINLEVEL: MODERATE PAIN (4)

## 2022-10-21 NOTE — NURSING NOTE
"Chief Complaint   Patient presents with     Recheck Medication       Initial /80   Pulse 69   Temp 97.3  F (36.3  C) (Tympanic)   Resp 16   Wt 69.9 kg (154 lb 3.2 oz)   SpO2 99%   BMI 23.45 kg/m   Estimated body mass index is 23.45 kg/m  as calculated from the following:    Height as of 8/30/21: 1.727 m (5' 8\").    Weight as of this encounter: 69.9 kg (154 lb 3.2 oz).  Medication Reconciliation: complete    FOOD SECURITY SCREENING QUESTIONS  Hunger Vital Signs:  Within the past 12 months we worried whether our food would run out before we got money to buy more. Never  Within the past 12 months the food we bought just didn't last and we didn't have money to get more. Never  Marilu Hill LPN 10/21/2022 8:39 AM    Do you have a healthcare directive? No        "

## 2022-10-21 NOTE — PROGRESS NOTES
Assessment & Plan     (E03.8) Other specified hypothyroidism  (primary encounter diagnosis)  Comment: stable  Plan: levothyroxine (EUTHYROX) 50 MCG tablet, TSH        refilled    (F33.0) Depression, major, recurrent, mild (H)  Comment: this is just mildly worsened, and his BLAKE is also a little worse so increased zoloft from 50 to 100 milligram.  Plan: sertraline (ZOLOFT) 100 MG tablet             (I10) Essential hypertension  Comment: stable,   Plan: Basic Metabolic Panel, Magnesium        No dose chage    (M51.37) Degeneration of lumbar or lumbosacral intervertebral disc  Comment: stable. On medical THC.  Plan: traMADol (ULTRAM) 50 MG tablet, Urine Drugs of         Abuse Screen        Refilled for 6 months at 1 bid    (M96.1) Failed back syndrome of lumbar spine  Comment:    Plan: traMADol (ULTRAM) 50 MG tablet             (Z12.11) Colon cancer screening  Comment:    Plan: COLOGUARD([employer])                            Return in about 6 months (around 4/21/2023).    Andres Flor MD  [facility]    Subjective   Solo is a 67 year old, presenting for the following health issues:  Recheck Medication      History of Present Illness       Reason for visit:  Follow up meds    He eats 2-3 servings of fruits and vegetables daily.He consumes 1 sweetened beverage(s) daily.He exercises with enough effort to increase his heart rate 30 to 60 minutes per day.  He exercises with enough effort to increase his heart rate 5 days per week. He is missing 1 dose(s) of medications per week.    Today's PHQ-9         PHQ-9 Total Score: 5    PHQ-9 Q9 Thoughts of better off dead/self-harm past 2 weeks :   Not at all    How difficult have these problems made it for you to do your work, take care of things at home, or get along with other people: Somewhat difficult  Today's BLAKE-7 Score: 8     Has more irritation with mild issues, like filing out the Ipad here.  Frustrates easily.  Wife has told him this too.       Du for follow up on hypertension  Tolerating meds well.  No chest pain, no shortness of breath.      Current Outpatient Medications   Medication     levothyroxine (EUTHYROX) 50 MCG tablet     lisinopril-hydrochlorothiazide (ZESTORETIC) 10-12.5 MG tablet     medical cannabis (Patient's own supply.  Not a prescription)     sertraline (ZOLOFT) 100 MG tablet     traMADol (ULTRAM) 50 MG tablet     triamcinolone (KENALOG) 0.1 % cream     No current facility-administered medications for this visit.         Pain History:  When did you first notice your pain? - Chronic Pain   Have you seen this provider for your pain in the past?   Yes   Where in your body do you have pain?    Are you seeing anyone else for your pain? No    PHQ-9 SCORE 10/9/2020 3/10/2022 10/21/2022   PHQ-9 Total Score MyChart - 7 (Mild depression) 5 (Mild depression)   PHQ-9 Total Score 7 7 5       BLAKE-7 SCORE 8/20/2021 8/30/2021 10/21/2022   Total Score - - 8 (mild anxiety)   Total Score 0 0 8               Chronic Pain Follow Up:    Location of pain: back  Analgesia/pain control:    - Recent changes:  none    - Overall control: Tolerable with discomfort    - Current treatments: tramadol   Adherence:     - Do you ever take more pain medicine than prescribed? No    - When did you take your last dose of pain medicine?  yesterday   Adverse effects: No   PDMP Review       Value Time User    State PDMP site checked  Yes 10/21/2022  8:53 AM Andres Flor MD        Last CSA Agreement:   CSA -- Patient Level:     [Media Unavailable] Controlled Substance Agreement - Opioid - Scan on 6/29/2021  1:13 PM       Last UDS: 7/2/2021            Review of Systems         Objective    /80   Pulse 69   Temp 97.3  F (36.3  C) (Tympanic)   Resp 16   Wt 69.9 kg (154 lb 3.2 oz)   SpO2 99%   BMI 23.45 kg/m    Body mass index is 23.45 kg/m .  Physical Exam  Constitutional:       Appearance: Normal appearance.   Cardiovascular:      Rate and Rhythm: Normal rate and  regular rhythm.      Pulses: Normal pulses.      Heart sounds: Normal heart sounds.   Pulmonary:      Effort: Pulmonary effort is normal. No respiratory distress.      Breath sounds: No stridor.   Musculoskeletal:      Comments: No lumbar pain on palpation and negative straight leg raise.   Neurological:      General: No focal deficit present.      Mental Status: He is alert and oriented to person, place, and time.   Psychiatric:         Mood and Affect: Mood normal.         Behavior: Behavior normal.         Thought Content: Thought content normal.            Results for orders placed or performed in visit on 10/21/22   TSH     Status: Normal   Result Value Ref Range    TSH 2.11 0.40 - 4.00 mU/L   Basic Metabolic Panel     Status: Normal   Result Value Ref Range    Sodium 136 134 - 144 mmol/L    Potassium 4.3 3.5 - 5.1 mmol/L    Chloride 100 98 - 107 mmol/L    Carbon Dioxide (CO2) 31 21 - 31 mmol/L    Anion Gap 5 3 - 14 mmol/L    Urea Nitrogen 25 7 - 25 mg/dL    Creatinine 1.17 0.70 - 1.30 mg/dL    Calcium 10.0 8.6 - 10.3 mg/dL    Glucose 95 70 - 105 mg/dL    GFR Estimate 67 >60 mL/min/1.73m2   Magnesium     Status: Normal   Result Value Ref Range    Magnesium 2.1 1.9 - 2.7 mg/dL   Urine Drugs of Abuse Screen Panel 13     Status: Abnormal   Result Value Ref Range    Cannabinoids (70-vyi-3-carboxy-9-THC) Presumptive positive-Unconfirmed result (A) Not Detected    Phencyclidine Not Detected Not Detected    Cocaine (Benzoylecgonine) Not Detected Not Detected    Methamphetamine (d-Methamphetamine) Not Detected Not Detected    Opiates (Morphine) Not Detected Not Detected    Amphetamine (d-Amphetamine) Not Detected Not Detected    Benzodiazepines (Nordiazepam) Not Detected Not Detected    Tricyclic Antidepressants (Desipramine) Not Detected Not Detected    Methadone Not Detected Not Detected    Barbiturates (Butalbital) Not Detected Not Detected    Oxycodone Not Detected Not Detected    Propoxyphene (Norpropoxyphene) Not  Detected Not Detected    Buprenorphine Not Detected Not Detected   Urine Drugs of Abuse Screen     Status: Abnormal    Narrative    The following orders were created for panel order Urine Drugs of Abuse Screen.  Procedure                               Abnormality         Status                     ---------                               -----------         ------                     Urine Drugs of Abuse Scr...[[id number]]  Abnormal            Final result                 Please view results for these tests on the individual orders.

## 2022-10-21 NOTE — LETTER
Opioid / Opioid Plus Controlled Substance Agreement    This is an agreement between you and your provider about the safe and appropriate use of controlled substance/opioids prescribed by your care team. Controlled substances are medicines that can cause physical and mental dependence (abuse).    There are strict laws about having and using these medicines. We here at [facility] are committing to working with you in your efforts to get better. To support you in this work, we ll help you schedule regular office appointments for medicine refills. If we must cancel or change your appointment for any reason, we ll make sure you have enough medicine to last until your next appointment.     As a Provider, I will:    Listen carefully to your concerns and treat you with respect.     Recommend a treatment plan that I believe is in your best interest. This plan may involve therapies other than opioid pain medication.     Talk with you often about the possible benefits, and the risk of harm of any medicine that we prescribe for you.     Provide a plan on how to taper (discontinue or go off) using this medicine if the decision is made to stop its use.    As a Patient, I understand that opioid(s):     Are a controlled substance prescribed by my care team to help me function or work and manage my condition(s).     Are strong medicines and can cause serious side effects such as:    Drowsiness, which can seriously affect my driving ability    A lower breathing rate, enough to cause death    Harm to my thinking ability     Depression     Abuse of and addiction to this medicine    Need to be taken exactly as prescribed. Combining opioids with certain medicines or chemicals (such as illegal drugs, sedatives, sleeping pills, and benzodiazepines) can be dangerous or even fatal. If I stop opioids suddenly, I may have severe withdrawal symptoms.    Do not work for all types of pain nor for all patients. If they re not helpful, I may  be asked to stop them.        The risks, benefits and side effects of these medicine(s) were explained to me. I agree that:  1. I will take part in other treatments as advised by my care team. This may be psychiatry or counseling, physical therapy, behavioral therapy, group treatment or a referral to a specialist.     2. I will keep all my appointments. I understand that this is part of the monitoring of opioids. My care team may require an office visit for EVERY opioid/controlled substance refill. If I miss appointments or don t follow instructions, my care team may stop my medicine.    3. I will take my medicines as prescribed. I will not change the dose or schedule unless my care team tells me to. There will be no refills if I run out early.     4. I may be asked to come to the clinic and complete a urine drug test or complete a pill count at any time. If I don t give a urine sample or participate in a pill count, the care team may stop my medicine.    5. I will only receive prescriptions from this clinic for chronic pain. If I am treated by another provider for acute pain issues, I will tell them that I am taking opioid pain medication for chronic pain and that I have a treatment agreement with this provider. I will inform my [facility] care team within one business day if I am given a prescription for any pain medication by another healthcare provider. My [facility] care team can contact other providers and pharmacists about my use of any medicines.    6. It is up to me to make sure that I don t run out of my medicines on weekends or holidays. If my care team is willing to refill my opioid prescription without a visit, I must request refills only during office hours. Refills may take up to 3 business days to process. I will use one pharmacy to fill all my opioid and other controlled substance prescriptions. I will notify the clinic about any changes to my insurance or medication  availability.    7. I am responsible for my prescriptions. If the medicine/prescription is lost, stolen or destroyed, it will not be replaced. I also agree not to share controlled substance medicines with anyone.    8. I am aware I should not use any illegal or recreational drugs. I agree not to drink alcohol unless my care team says I can.       9. If I enroll in the [place] [facility] Cannabis program, I will tell my care team prior to my next refill.     10. I will tell my care team right away if I become pregnant, have a new medical problem treated outside of my regular clinic, or have a change in my medications.    11. I understand that this medicine can affect my thinking, judgment and reaction time. Alcohol and drugs affect the brain and body, which can affect the safety of my driving. Being under the influence of alcohol or drugs can affect my decision-making, behaviors, personal safety, and the safety of others. Driving while impaired (DWI) can occur if a person is driving, operating, or in physical control of a car, motorcycle, boat, snowmobile, ATV, motorbike, off-road vehicle, or any other motor vehicle ([place] Statute 169A.20). I understand the risk if I choose to drive or operate any vehicle or machinery.    I understand that if I do not follow any of the conditions above, my prescriptions or treatment may be stopped or changed.          Opioids  What You Need to Know    What are opioids?   Opioids are pain medicines that must be prescribed by a doctor. They are also known as narcotics.     Examples are:   1. morphine (MS Contin, Deidra)  2. oxycodone (Oxycontin)  3. oxycodone and acetaminophen (Percocet)  4. hydrocodone and acetaminophen (Vicodin, Norco)   5. fentanyl patch (Duragesic)   6. hydromorphone (Dilaudid)   7. methadone  8. codeine (Tylenol #3)     What do opioids do well?   Opioids are best for severe short-term pain such as after a surgery or injury. They may work well for cancer pain. They may  help some people with long-lasting (chronic) pain.     What do opioids NOT do well?   Opioids never get rid of pain entirely, and they don t work well for most patients with chronic pain. Opioids don t reduce swelling, one of the causes of pain.                                    Other ways to manage chronic pain and improve function include:       Treat the health problem that may be causing pain    Anti-inflammation medicines, which reduce swelling and tenderness, such as ibuprofen (Advil, Motrin) or naproxen (Aleve)    Acetaminophen (Tylenol)    Antidepressants and anti-seizure medicines, especially for nerve pain    Topical treatments such as patches or creams    Injections or nerve blocks    Chiropractic or osteopathic treatment    Acupuncture, massage, deep breathing, meditation, visual imagery, aromatherapy    Use heat or ice at the pain site    Physical therapy     Exercise    Stop smoking    Take part in therapy       Risks and side effects     Talk to your doctor before you start or decide to keep taking opioids. Possible side effects include:      Lowering your breathing rate enough to cause death    Overdose, including death, especially if taking higher than prescribed doses    Worse depression symptoms; less pleasure in things you usually enjoy    Feeling tired or sluggish    Slower thoughts or cloudy thinking    Being more sensitive to pain over time; pain is harder to control    Trouble sleeping or restless sleep    Changes in hormone levels (for example, less testosterone)    Changes in sex drive or ability to have sex    Constipation    Unsafe driving    Itching and sweating    Dizziness    Nausea, throwing up and dry mouth    What else should I know about opioids?    Opioids may lead to dependence, tolerance, or addiction.      Dependence means that if you stop or reduce the medicine too quickly, you will have withdrawal symptoms. These include loose poop (diarrhea), jitters, flu-like symptoms,  nervousness and tremors. Dependence is not the same as addiction.                       Tolerance means needing higher doses over time to get the same effect. This may increase the chance of serious side effects.      Addiction is when people improperly use a substance that harms their body, their mind or their relations with others. Use of opiates can cause a relapse of addiction if you have a history of drug or alcohol abuse.      People who have used opioids for a long time may have a lower quality of life, worse depression, higher levels of pain and more visits to doctors.    You can overdose on opioids. Take these steps to lower your risk of overdose:    1. Recognize the signs:  Signs of overdose include decrease or loss of consciousness (blackout), slowed breathing, trouble waking up and blue lips. If someone is worried about overdose, they should call 911.    2. Talk to your doctor about Narcan (naloxone).   If you are at risk for overdose, you may be given a prescription for Narcan. This medicine very quickly reverses the effects of opioids.   If you overdose, a friend or family member can give you Narcan while waiting for the ambulance. They need to know the signs of overdose and how to give Narcan.     3. Don't use alcohol or street drugs.   Taking them with opioids can cause death.    4. Do not take any of these medicines unless your doctor says it s OK. Taking these with opioids can cause death:    Benzodiazepines, such as lorazepam (Ativan), alprazolam (Xanax) or diazepam (Valium)    Muscle relaxers, such as cyclobenzaprine (Flexeril)    Sleeping pills like zolpidem (Ambien)     Other opioids      How to keep you and other people safe while taking opioids:    1. Never share your opioids with others.  Opioid medicines are regulated by the Drug Enforcement Agency (JOSH). Selling or sharing medications is a criminal act.    2. Be sure to store opioids in a secure place, locked up if possible. Young children  can easily swallow them and overdose.    3. When you are traveling with your medicines, keep them in the original bottles. If you use a pill box, be sure you also carry a copy of your medicine list from your clinic or pharmacy.    4. Safe disposal of opioids    Most pharmacies have places to get rid of medicine, called disposal kiosks. Medicine disposal options are also available in every [facility]. Search your county and  medication disposal  to find more options. You can find more details at:  [email or web address]     I agree that my provider, clinic care team, and pharmacy may work with any city, state or federal law enforcement agency that investigates the misuse, sale, or other diversion of my controlled medicine. I will allow my provider to discuss my care with, or share a copy of, this agreement with any other treating provider, pharmacy or emergency room where I receive care.    I have read this agreement and have asked questions about anything I did not understand.    _______________________________________________________  Patient Signature - Km Freedman _____________________                   Date     _______________________________________________________  Provider Signature - Andres Flor MD   _____________________                   Date     _______________________________________________________  Witness Signature (required if provider not present while patient signing)   _____________________                   Date

## 2022-11-02 LAB — NONINV COLON CA DNA+OCC BLD SCRN STL QL: NEGATIVE

## 2022-11-21 ENCOUNTER — OFFICE VISIT (OUTPATIENT)
Dept: UROLOGY | Facility: OTHER | Age: 67
End: 2022-11-21
Attending: UROLOGY
Payer: MEDICARE

## 2022-11-21 VITALS
OXYGEN SATURATION: 97 % | HEART RATE: 52 BPM | BODY MASS INDEX: 23.57 KG/M2 | SYSTOLIC BLOOD PRESSURE: 130 MMHG | DIASTOLIC BLOOD PRESSURE: 78 MMHG | WEIGHT: 155 LBS | RESPIRATION RATE: 16 BRPM

## 2022-11-21 DIAGNOSIS — E29.1 HYPOGONADISM IN MALE: Primary | ICD-10-CM

## 2022-11-21 PROCEDURE — 96372 THER/PROPH/DIAG INJ SC/IM: CPT | Performed by: UROLOGY

## 2022-11-21 PROCEDURE — 250N000011 HC RX IP 250 OP 636: Performed by: UROLOGY

## 2022-11-21 PROCEDURE — G0463 HOSPITAL OUTPT CLINIC VISIT: HCPCS | Mod: 25

## 2022-11-21 PROCEDURE — 99213 OFFICE O/P EST LOW 20 MIN: CPT | Performed by: UROLOGY

## 2022-11-21 RX ADMIN — TESTOSTERONE UNDECANOATE 750 MG: 250 INJECTION INTRAMUSCULAR at 08:34

## 2022-11-21 NOTE — PROGRESS NOTES
Type of Visit  EST    Chief Complaint  Hypogonadism    HPI  Mr. Freedman is a 67 y.o. male who follows up with symptomatic hypogonadism.  Primary symptoms include low energy and poor sleep.  His labs were last checked earlier this summer less than 6 months ago.  They were at goal at that time.  He has continued receiving Aveed every 10 weeks.  He denies side effects with this approach.  He reports continued improvement in both energy and sleep.    He continues to donate blood on a routine basis as he does have a history of a mildly elevated hematocrit with testosterone supplementation.  He typically donates 2+ times per year.  He is planning to donate this winter.    Hypogonadism history  Patient started testosterone supplementation around 2015.  He has failed topical patches and short acting injections.      Review of Systems  I reviewed the ROS with the patient today.    Nursing Notes:   Batsheva Sheriff LPN  11/21/2022  8:31 AM  Addendum  Pt presents to clinic for Aveed injection  Review of Systems:    Weight loss:    No     Recent fever/chills:  No   Night sweats:   No  Current skin rash:  No   Recent hair loss:  No  Heat intolerance:  No   Cold intolerance:  No  Chest pain:   No   Palpitations:   No  Shortness of breath:  No   Wheezing:   No  Constipation:    No   Diarrhea:   No   Nausea:   No   Vomiting:   No   Kidney/side pain:  No   Back pain:   Yes  Frequent headaches:  No   Dizziness:     No  Leg swelling:   No   Calf pain:    Yes        Physical Exam  /78 (BP Location: Left arm, Patient Position: Sitting, Cuff Size: Adult Regular)   Pulse 52   Resp 16   Wt 70.3 kg (155 lb)   SpO2 97%   BMI 23.57 kg/m    Constitutional: No acute distress.  Alert and cooperative   Pulmonary/Chest: Respirations are even and non-labored bilaterally, no audible wheezing  Abdominal: Soft. No distension, tenderness, masses or guarding.   Extremities: GILDARDO x 4, Warm. No clubbing.  No cyanosis.    Skin: Pink, warm and  dry.  No visible rashes noted.  Back:  No left CVA tenderness.  No right CVA tenderness.  Genitourinary:  Nonpalpable bladder    Labs   06/27/22 07:14   PSA 1.29   Testosterone Total 411   Hematocrit 50.2     Results for TYRON JONES (MRN [id number]) as of 7/12/2021 09:01   7/12/2021 07:18   Hematocrit 51.6   Testosterone Total 314     Results for TYRON JONES (MRN [id number]) as of 10/1/2020 12:45   10/1/2020 07:49   Prostate Specific Antigen 1.205   Testosterone Total 263   Hematocrit 52.1     Results for TYRON JONES (MRN [id number]) as of 12/30/2019 09:17   12/16/2019 08:47   Testosterone Total 323   Hematocrit 52.1     Results fr TYRON JONES (MRN [id number]) as of 5/30/2019 08:17   5/16/2019 08:48   Prostate Specific Antigen 0.885   Testosterone Total 338   Hematocrit 54.8 (H)     Results for TEJINDER JONES (MRN [id number]) as of 9/22/2016 09:15   3/7/2016 09:16   TESTOSTERONE,TOTAL 104.4 (L)     Testosterone Injection  Today the patient received an injection of testosterone undecanoate 750mg.  This was given in the gluteus.  The results of this injection: None  Patient was monitored for 30 minutes following the injection.    Assessment  Hypogonadism- continue Aveed q10 weeks  He does have a history of elevated hematocrit -he donates blood regularly - hematocrit WNL    Plan  Labs q4 injections   Aveed 750mg IM every 10 weeks (labs collected with the next visit-PSA, hematocrit and testosterone)

## 2023-01-30 ENCOUNTER — OFFICE VISIT (OUTPATIENT)
Dept: FAMILY MEDICINE | Facility: OTHER | Age: 68
End: 2023-01-30
Attending: FAMILY MEDICINE
Payer: MEDICARE

## 2023-01-30 ENCOUNTER — OFFICE VISIT (OUTPATIENT)
Dept: UROLOGY | Facility: OTHER | Age: 68
End: 2023-01-30
Attending: UROLOGY
Payer: MEDICARE

## 2023-01-30 VITALS
HEART RATE: 68 BPM | DIASTOLIC BLOOD PRESSURE: 88 MMHG | OXYGEN SATURATION: 98 % | RESPIRATION RATE: 16 BRPM | BODY MASS INDEX: 24.91 KG/M2 | TEMPERATURE: 96.5 F | HEIGHT: 66 IN | WEIGHT: 155 LBS | SYSTOLIC BLOOD PRESSURE: 138 MMHG

## 2023-01-30 VITALS
OXYGEN SATURATION: 100 % | WEIGHT: 155.8 LBS | SYSTOLIC BLOOD PRESSURE: 130 MMHG | BODY MASS INDEX: 23.69 KG/M2 | DIASTOLIC BLOOD PRESSURE: 78 MMHG | HEART RATE: 75 BPM | RESPIRATION RATE: 16 BRPM

## 2023-01-30 DIAGNOSIS — E29.1 HYPOGONADISM IN MALE: Primary | ICD-10-CM

## 2023-01-30 DIAGNOSIS — M96.1 FAILED BACK SYNDROME OF LUMBAR SPINE: ICD-10-CM

## 2023-01-30 DIAGNOSIS — M51.379 DEGENERATION OF LUMBAR OR LUMBOSACRAL INTERVERTEBRAL DISC: ICD-10-CM

## 2023-01-30 DIAGNOSIS — F33.0 DEPRESSION, MAJOR, RECURRENT, MILD (H): ICD-10-CM

## 2023-01-30 PROCEDURE — 90677 PCV20 VACCINE IM: CPT

## 2023-01-30 PROCEDURE — G0463 HOSPITAL OUTPT CLINIC VISIT: HCPCS | Mod: 25

## 2023-01-30 PROCEDURE — 99213 OFFICE O/P EST LOW 20 MIN: CPT | Performed by: UROLOGY

## 2023-01-30 PROCEDURE — 99213 OFFICE O/P EST LOW 20 MIN: CPT | Performed by: FAMILY MEDICINE

## 2023-01-30 PROCEDURE — G0463 HOSPITAL OUTPT CLINIC VISIT: HCPCS | Mod: 25,27

## 2023-01-30 PROCEDURE — G0463 HOSPITAL OUTPT CLINIC VISIT: HCPCS

## 2023-01-30 PROCEDURE — 96372 THER/PROPH/DIAG INJ SC/IM: CPT | Performed by: UROLOGY

## 2023-01-30 PROCEDURE — 250N000011 HC RX IP 250 OP 636: Performed by: UROLOGY

## 2023-01-30 RX ORDER — TRAMADOL HYDROCHLORIDE 50 MG/1
50 TABLET ORAL EVERY 6 HOURS PRN
Qty: 60 TABLET | Refills: 5 | Status: SHIPPED | OUTPATIENT
Start: 2023-01-30 | End: 2023-04-20

## 2023-01-30 RX ADMIN — TESTOSTERONE UNDECANOATE 750 MG: 250 INJECTION INTRAMUSCULAR at 08:28

## 2023-01-30 ASSESSMENT — PAIN SCALES - GENERAL
PAINLEVEL: MODERATE PAIN (4)
PAINLEVEL: MILD PAIN (3)

## 2023-01-30 ASSESSMENT — ANXIETY QUESTIONNAIRES
7. FEELING AFRAID AS IF SOMETHING AWFUL MIGHT HAPPEN: NOT AT ALL
6. BECOMING EASILY ANNOYED OR IRRITABLE: NOT AT ALL
IF YOU CHECKED OFF ANY PROBLEMS ON THIS QUESTIONNAIRE, HOW DIFFICULT HAVE THESE PROBLEMS MADE IT FOR YOU TO DO YOUR WORK, TAKE CARE OF THINGS AT HOME, OR GET ALONG WITH OTHER PEOPLE: NOT DIFFICULT AT ALL
GAD7 TOTAL SCORE: 0
2. NOT BEING ABLE TO STOP OR CONTROL WORRYING: NOT AT ALL
3. WORRYING TOO MUCH ABOUT DIFFERENT THINGS: NOT AT ALL
5. BEING SO RESTLESS THAT IT IS HARD TO SIT STILL: NOT AT ALL
1. FEELING NERVOUS, ANXIOUS, OR ON EDGE: NOT AT ALL
GAD7 TOTAL SCORE: 0

## 2023-01-30 ASSESSMENT — PATIENT HEALTH QUESTIONNAIRE - PHQ9
SUM OF ALL RESPONSES TO PHQ QUESTIONS 1-9: 0
5. POOR APPETITE OR OVEREATING: NOT AT ALL

## 2023-01-30 NOTE — PROGRESS NOTES
"  Assessment & Plan     (F33.0) Depression, major, recurrent, mild (H)  Comment: improved,   Plan: no med changes    (M51.37) Degeneration of lumbar or lumbosacral intervertebral disc  Comment: stable  Plan: traMADol (ULTRAM) 50 MG tablet         reviewed, refilled this for 6 more months     (M96.1) Failed back syndrome of lumbar spine  Comment:    Plan: traMADol (ULTRAM) 50 MG tablet                        BMI:   Estimated body mass index is 25.4 kg/m  as calculated from the following:    Height as of this encounter: 1.664 m (5' 5.5\").    Weight as of this encounter: 70.3 kg (155 lb).           No follow-ups on file.    Andres Flor MD  [facility]    Subjective   Solo is a 67 year old, presenting for the following health issues:  Musculoskeletal Problem      Musculoskeletal Problem  The current episode started more than 1 year ago. The problem occurs constantly. The problem has been gradually improving. The symptoms are aggravated by bending, coughing, exertion, sneezing, standing, twisting and walking. He has tried oral narcotics, ice, heat and NSAIDs for the symptoms. The treatment provided mild relief.   History of Present Illness       Reason for visit:  Follow up meds    He eats 2-3 servings of fruits and vegetables daily.He consumes 0 sweetened beverage(s) daily.He exercises with enough effort to increase his heart rate 30 to 60 minutes per day.  He exercises with enough effort to increase his heart rate 5 days per week.   He is taking medications regularly.     His antidepressant was increased last time and he has much more energy.      Pain History:  When did you first notice your pain? - Chronic Pain   Have you seen this provider for your pain in the past?   Yes   Where in your body do you have pain? back  Are you seeing anyone else for your pain? No    PHQ-9 SCORE 3/10/2022 10/21/2022 1/30/2023   PHQ-9 Total Score K[place]art 7 (Mild depression) 5 (Mild depression) -   PHQ-9 Total " "Score 7 5 0       BLAKE-7 SCORE 8/30/2021 10/21/2022 1/30/2023   Total Score - 8 (mild anxiety) -   Total Score 0 8 0               Chronic Pain Follow Up:    Location of pain: low back  Analgesia/pain control:    - Recent changes:  no    - Overall control: Tolerable with discomfort    - Current treatments: THC, medical; fusion and spinal implant  Adherence:     - Do you ever take more pain medicine than prescribed? No    - When did you take your last dose of pain medicine?  yesterday   Adverse effects: No   PDMP Review       Value Time User    State PDMP site checked  Yes 10/21/2022  8:53 AM Andres Flor MD        Last CSA Agreement:   CSA -- Patient Level:     [Media Unavailable] Controlled Substance Agreement - Opioid - Scan on 10/21/2022 11:45 AM   [Media Unavailable] Controlled Substance Agreement - Opioid - Scan on 6/29/2021  1:13 PM       Last UDS: 10/21/2022            Review of Systems         Objective    /88 (BP Location: Right arm, Patient Position: Sitting, Cuff Size: Adult Regular)   Pulse 68   Temp (!) 96.5  F (35.8  C) (Tympanic)   Resp 16   Ht 1.664 m (5' 5.5\")   Wt 70.3 kg (155 lb)   SpO2 98%   BMI 25.40 kg/m    Body mass index is 25.4 kg/m .  Physical Exam  Constitutional:       Appearance: Normal appearance.   Musculoskeletal:      Comments: No lumbar pain on palpation currently. Negative straight leg raise and normal lower extremity strength    Neurological:      Mental Status: He is alert.   Psychiatric:         Mood and Affect: Mood normal.         Behavior: Behavior normal.         Thought Content: Thought content normal.                            "

## 2023-01-30 NOTE — PROGRESS NOTES
Type of Visit  EST    Chief Complaint  Hypogonadism    HPI  Mr. Freedman is a 67 y.o. male who follows up with symptomatic hypogonadism.  Primary symptoms include low energy and poor sleep.  He has continued receiving Aveed every 10 weeks which has effectively improved his symptoms.  He denies side effects with this approach.  He follows up today for dose 10 weeks from the last appointment.  He underwent labs this past summer revealing therapeutic levels.    He continues to donate blood on a routine basis as he does have a history of a mildly elevated hematocrit with testosterone supplementation.  He typically donates 2+ times per year.  He just donated in the last month.  Donated 4 times in the last year.    Hypogonadism history  Patient started testosterone supplementation around 2015.  He has failed topical patches and short acting injections.      Review of Systems  I reviewed the ROS with the patient today.    Nursing Notes:   Shelby Francis LPN  1/30/2023  8:28 AM  Addendum  Chief Complaint   Patient presents with     Procedure     10 week aveed     Patient presents to the clinic today for a 10 week Aveed.    Review of Systems:    Weight loss:    No     Recent fever/chills:  No   Night sweats:   No  Current skin rash:  No   Recent hair loss:  No  Heat intolerance:  No   Cold intolerance:  No  Chest pain:   No   Palpitations:   No  Shortness of breath:  No   Wheezing:   No  Constipation:    No   Diarrhea:   No   Nausea:   No   Vomiting:   No   Kidney/side pain:  No   Back pain:   Yes  Frequent headaches:  No   Dizziness:     No  Leg swelling:   No   Calf pain:    Yes      Medication Reconciliation: completed   Shelby Francis LPN  1/30/2023 8:22 AM     Physical Exam  /78 (BP Location: Right arm, Patient Position: Sitting)   Pulse 75   Resp 16   Wt 70.7 kg (155 lb 12.8 oz)   SpO2 100%   BMI 23.69 kg/m    Constitutional: No acute distress.  Alert and cooperative   Pulmonary/Chest: Respirations are even and  non-labored bilaterally, no audible wheezing  Abdominal: Soft. No distension, tenderness, masses or guarding.   Extremities: GILDARDO x 4, Warm. No clubbing.  No cyanosis.    Skin: Pink, warm and dry.  No visible rashes noted.  Back:  No left CVA tenderness.  No right CVA tenderness.  Genitourinary:  Nonpalpable bladder    Labs   06/27/22 07:14   PSA 1.29   Testosterone Total 411   Hematocrit 50.2     Results for TYRON JONES (MRN [id number]) as of 7/12/2021 09:01   7/12/2021 07:18   Hematocrit 51.6   Testosterone Total 314     Results for TYRON JONES (MRN [id number]) as of 10/1/2020 12:45   10/1/2020 07:49   Prostate Specific Antigen 1.205   Testosterone Total 263   Hematocrit 52.1     Results for TYRON JONES (MRN [id number]) as of 12/30/2019 09:17   12/16/2019 08:47   Testosterone Total 323   Hematocrit 52.1     Results fr TYRON JONES (MRN [id number]) as of 5/30/2019 08:17   5/16/2019 08:48   Prostate Specific Antigen 0.885   Testosterone Total 338   Hematocrit 54.8 (H)     Results for TEJINDER JONES (MRN [id number]) as of 9/22/2016 09:15   3/7/2016 09:16   TESTOSTERONE,TOTAL 104.4 (L)     Testosterone Injection  Today the patient received an injection of testosterone undecanoate 750mg.  This was given in the gluteus.  The results of this injection: None  Patient was monitored for 30 minutes following the injection.    Assessment  Hypogonadism- continue Aveed q10 weeks  He does have a history of elevated hematocrit -he donates blood regularly - hematocrit WNL  Just donated in the last month or so.  Labs will be due this summer    Plan  Labs q4 injections   Aveed 750mg IM every 10 weeks

## 2023-01-30 NOTE — NURSING NOTE
Chief Complaint   Patient presents with     Procedure     10 week aveed     Patient presents to the clinic today for a 10 week Aveed.    Review of Systems:    Weight loss:    No     Recent fever/chills:  No   Night sweats:   No  Current skin rash:  No   Recent hair loss:  No  Heat intolerance:  No   Cold intolerance:  No  Chest pain:   No   Palpitations:   No  Shortness of breath:  No   Wheezing:   No  Constipation:    No   Diarrhea:   No   Nausea:   No   Vomiting:   No   Kidney/side pain:  No   Back pain:   Yes  Frequent headaches:  No   Dizziness:     No  Leg swelling:   No   Calf pain:    Yes      Medication Reconciliation: completed   Shelby Francis LPN  1/30/2023 8:22 AM

## 2023-02-16 ENCOUNTER — OFFICE VISIT (OUTPATIENT)
Dept: FAMILY MEDICINE | Facility: OTHER | Age: 68
End: 2023-02-16
Payer: MEDICARE

## 2023-02-16 VITALS
WEIGHT: 156.6 LBS | OXYGEN SATURATION: 98 % | TEMPERATURE: 97.6 F | DIASTOLIC BLOOD PRESSURE: 62 MMHG | HEART RATE: 68 BPM | RESPIRATION RATE: 16 BRPM | BODY MASS INDEX: 25.17 KG/M2 | HEIGHT: 66 IN | SYSTOLIC BLOOD PRESSURE: 120 MMHG

## 2023-02-16 DIAGNOSIS — S61.309A NAIL AVULSION, FINGER, INITIAL ENCOUNTER: Primary | ICD-10-CM

## 2023-02-16 PROCEDURE — G0463 HOSPITAL OUTPT CLINIC VISIT: HCPCS

## 2023-02-16 PROCEDURE — 90471 IMMUNIZATION ADMIN: CPT

## 2023-02-16 PROCEDURE — G0463 HOSPITAL OUTPT CLINIC VISIT: HCPCS | Mod: 25

## 2023-02-16 PROCEDURE — 99213 OFFICE O/P EST LOW 20 MIN: CPT | Performed by: PHYSICIAN ASSISTANT

## 2023-02-16 ASSESSMENT — PAIN SCALES - GENERAL: PAINLEVEL: MILD PAIN (3)

## 2023-02-16 NOTE — PATIENT INSTRUCTIONS
Thumb nail avulsion  Soak for the next 24- 48 hours (10-20 minutes daily)  Apply betadine and then vaseline for protection  Soak bandages as needed to remove is they are stuck to nailbed  Cover with guaze to protect  Elevate, ice, tylenol 1000 mg every 4-6 hours, max 4000 mg/day  Return to clinic for s/s of infection    Trim nail as it grows out

## 2023-02-16 NOTE — NURSING NOTE
Chief Complaint   Patient presents with     Musculoskeletal Problem     R thumb - nail taken off with      Patient took nail off right thumb this afternoon while using a   Patient tx with peroxide/water soak and antiseptic spray.      Advanced Care Planning on file? no    Medication Review Completed: complete    FOOD SECURITY SCREENING QUESTIONS:    The next two questions are to help us understand your food security.  If you are feeling you need any assistance in this area, we have resources available to support you today.    Hunger Vital Signs:  Within the past 12 months we worried whether our food would run out before we got money to buy more. Never  Within the past 12 months the food we bought just didn't last and we didn't have money to get more. Never    Liz Odonnell LPN
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
## 2023-03-20 DIAGNOSIS — E29.1 HYPOGONADISM IN MALE: Primary | ICD-10-CM

## 2023-04-20 ENCOUNTER — OFFICE VISIT (OUTPATIENT)
Dept: FAMILY MEDICINE | Facility: OTHER | Age: 68
End: 2023-04-20
Attending: FAMILY MEDICINE
Payer: COMMERCIAL

## 2023-04-20 VITALS
BODY MASS INDEX: 25.43 KG/M2 | DIASTOLIC BLOOD PRESSURE: 80 MMHG | TEMPERATURE: 97 F | SYSTOLIC BLOOD PRESSURE: 126 MMHG | OXYGEN SATURATION: 97 % | WEIGHT: 155.2 LBS | RESPIRATION RATE: 15 BRPM | HEART RATE: 64 BPM

## 2023-04-20 DIAGNOSIS — F33.0 DEPRESSION, MAJOR, RECURRENT, MILD (H): ICD-10-CM

## 2023-04-20 DIAGNOSIS — M51.379 DEGENERATION OF LUMBAR OR LUMBOSACRAL INTERVERTEBRAL DISC: Primary | ICD-10-CM

## 2023-04-20 DIAGNOSIS — M96.1 FAILED BACK SYNDROME OF LUMBAR SPINE: ICD-10-CM

## 2023-04-20 DIAGNOSIS — F11.90 CHRONIC, CONTINUOUS USE OF OPIOIDS: ICD-10-CM

## 2023-04-20 PROCEDURE — G0463 HOSPITAL OUTPT CLINIC VISIT: HCPCS

## 2023-04-20 PROCEDURE — 99214 OFFICE O/P EST MOD 30 MIN: CPT | Performed by: FAMILY MEDICINE

## 2023-04-20 RX ORDER — TRAMADOL HYDROCHLORIDE 50 MG/1
50 TABLET ORAL EVERY 6 HOURS PRN
Qty: 60 TABLET | Refills: 5 | Status: SHIPPED | OUTPATIENT
Start: 2023-04-20 | End: 2023-08-01

## 2023-04-20 RX ORDER — BUPROPION HYDROCHLORIDE 150 MG/1
150 TABLET ORAL EVERY MORNING
Qty: 90 TABLET | Refills: 4 | Status: SHIPPED | OUTPATIENT
Start: 2023-04-20 | End: 2024-03-15

## 2023-04-20 ASSESSMENT — PATIENT HEALTH QUESTIONNAIRE - PHQ9
10. IF YOU CHECKED OFF ANY PROBLEMS, HOW DIFFICULT HAVE THESE PROBLEMS MADE IT FOR YOU TO DO YOUR WORK, TAKE CARE OF THINGS AT HOME, OR GET ALONG WITH OTHER PEOPLE: SOMEWHAT DIFFICULT
SUM OF ALL RESPONSES TO PHQ QUESTIONS 1-9: 10
SUM OF ALL RESPONSES TO PHQ QUESTIONS 1-9: 10

## 2023-04-20 ASSESSMENT — PAIN SCALES - GENERAL: PAINLEVEL: MODERATE PAIN (4)

## 2023-04-20 NOTE — PROGRESS NOTES
"  Assessment & Plan     (M51.37) Degeneration of lumbar or lumbosacral intervertebral disc  (primary encounter diagnosis)  Comment: stable, with low risk for misuse or abuse.  Plan: traMADol (ULTRAM) 50 MG tablet             (F11.90) F11.9 - Chronic, continuous use of opioids  Comment:    Plan:      (M96.1) Failed back syndrome of lumbar spine  Comment:    Plan: traMADol (ULTRAM) 50 MG tablet             (F33.0) Depression, major, recurrent, mild (H)  Comment: a bit worse over the winter here. He is on an selective serotonin reuptake inhibitor. We talked about either adding on wellbutrin, or changing to cymbalta. Both of these will help with pain a bit. He wants to stay on the zoloft  Plan: buPROPion (WELLBUTRIN XL) 150 MG 24 hr tablet                        BMI:   Estimated body mass index is 25.43 kg/m  as calculated from the following:    Height as of 2/16/23: 1.664 m (5' 5.5\").    Weight as of this encounter: 70.4 kg (155 lb 3.2 oz).           Return in about 6 weeks (around 6/1/2023).    Andres Flor MD  [facility]    Subjective   Solo is a 67 year old, presenting for the following health issues:  Recheck Medication        4/20/2023    11:25 AM   Additional Questions   Roomed by MIRZA Michel   Accompanied by Self         4/20/2023    11:25 AM   Patient Reported Additional Medications   Patient reports taking the following new medications N/A     History of Present Illness       Reason for visit:  Routine followup for medication    He eats 2-3 servings of fruits and vegetables daily.He consumes 1 sweetened beverage(s) daily.He exercises with enough effort to increase his heart rate 10 to 19 minutes per day.  He exercises with enough effort to increase his heart rate 3 or less days per week.   He is taking medications regularly.    Today's PHQ-9         PHQ-9 Total Score: 10    PHQ-9 Q9 Thoughts of better off dead/self-harm past 2 weeks :   Not at all    How difficult have these problems " made it for you to do your work, take care of things at home, or get along with other people: Somewhat difficult       Pain History:  When did you first notice your pain? Chronic   Have you seen this provider for your pain in the past?   Yes   Where in your body do you have pain? Low back, Bilateral calves  Are you seeing anyone else for your pain? No        10/21/2022     8:26 AM 1/30/2023    11:27 AM 4/20/2023    11:13 AM   PHQ-9 SCORE   PHQ-9 Total Score MyChart 5 (Mild depression)  10 (Moderate depression)   PHQ-9 Total Score 5 0 10           8/30/2021     9:54 AM 10/21/2022     8:27 AM 1/30/2023    11:27 AM   BLAKE-7 SCORE   Total Score  8 (mild anxiety)    Total Score 0 8 0           4/20/2023    11:30 AM   PEG Score   PEG Total Score 5.67           4/20/2023    11:30 AM   PEG Score   PEG Total Score 5.67       Chronic Pain Follow Up:    Location of pain: legs, low back  Analgesia/pain control:    - Recent changes:  no    - Overall control: Tolerable with discomfort    - Current treatments: tramadol, THC   Adherence:     - Do you ever take more pain medicine than prescribed? No    - When did you take your last dose of pain medicine?  today   Adverse effects: No   PDMP Review       Value Time User    State PDMP site checked  Yes 10/21/2022  8:53 AM Andres Flor MD        Last CSA Agreement:   CSA -- Patient Level:     [Media Unavailable] Controlled Substance Agreement - Opioid - Scan on 10/21/2022 11:45 AM   [Media Unavailable] Controlled Substance Agreement - Opioid - Scan on 6/29/2021  1:13 PM       Last UDS: 10/21/2022              Notes ongoing anhedonia. Wife told him to get on more meds. He does not like taking meds however. Some family stress.    Review of Systems         Objective    /80   Pulse 64   Temp 97  F (36.1  C) (Tympanic)   Resp 15   Wt 70.4 kg (155 lb 3.2 oz)   SpO2 97%   BMI 25.43 kg/m    Body mass index is 25.43 kg/m .  Physical Exam  Constitutional:       Appearance: Normal  appearance.   Neurological:      General: No focal deficit present.      Mental Status: He is alert and oriented to person, place, and time.   Psychiatric:         Mood and Affect: Mood normal.         Behavior: Behavior normal.         Thought Content: Thought content normal.

## 2023-04-20 NOTE — NURSING NOTE
"Chief Complaint   Patient presents with     Recheck Medication       Initial /80   Pulse 64   Temp 97  F (36.1  C) (Tympanic)   Resp 15   Wt 70.4 kg (155 lb 3.2 oz)   SpO2 97%   BMI 25.43 kg/m   Estimated body mass index is 25.43 kg/m  as calculated from the following:    Height as of 2/16/23: 1.664 m (5' 5.5\").    Weight as of this encounter: 70.4 kg (155 lb 3.2 oz).  Medication Reconciliation: complete    FOOD SECURITY SCREENING QUESTIONS  Hunger Vital Signs:  Within the past 12 months we worried whether our food would run out before we got money to buy more. Never  Within the past 12 months the food we bought just didn't last and we didn't have money to get more. Never  Marilu Hill LPN 4/20/2023 11:29 AM        "

## 2023-04-28 ENCOUNTER — LAB (OUTPATIENT)
Dept: LAB | Facility: OTHER | Age: 68
End: 2023-04-28
Attending: NURSE PRACTITIONER
Payer: MEDICARE

## 2023-04-28 DIAGNOSIS — R79.89 LOW TESTOSTERONE: ICD-10-CM

## 2023-04-28 LAB
HCT VFR BLD AUTO: 50.6 % (ref 40–53)
HGB BLD-MCNC: 16.5 G/DL (ref 13.3–17.7)

## 2023-04-28 PROCEDURE — 85018 HEMOGLOBIN: CPT | Mod: ZL

## 2023-04-28 PROCEDURE — 36415 COLL VENOUS BLD VENIPUNCTURE: CPT | Mod: ZL

## 2023-04-28 PROCEDURE — 84403 ASSAY OF TOTAL TESTOSTERONE: CPT | Mod: ZL

## 2023-04-28 PROCEDURE — 85014 HEMATOCRIT: CPT | Mod: ZL

## 2023-05-02 LAB — TESTOST SERPL-MCNC: 396 NG/DL (ref 240–950)

## 2023-05-04 DIAGNOSIS — E34.9 TESTOSTERONE DEFICIENCY: Primary | ICD-10-CM

## 2023-05-04 DIAGNOSIS — E29.1 HYPOGONADISM IN MALE: Primary | ICD-10-CM

## 2023-05-04 NOTE — PROGRESS NOTES
Order received for testosterone cypionate 100 mg every other week for a total of 2 injections. Ordering provider is LILI Abdi, with [facility]. Routing order to PCP to be signed as ordering provider is from outside facility. Order sent to be scanned into chart. Financial approval request faxed to [facility]. Once notice of approval is received, patient will be contacted to schedule appointment.     MADISON IRELAND RN on 5/4/2023 at 3:55 PM

## 2023-05-05 RX ORDER — TESTOSTERONE CYPIONATE 200 MG/ML
100 INJECTION, SOLUTION INTRAMUSCULAR
Status: DISCONTINUED | OUTPATIENT
Start: 2023-05-05 | End: 2023-05-19

## 2023-05-08 ENCOUNTER — ALLIED HEALTH/NURSE VISIT (OUTPATIENT)
Dept: FAMILY MEDICINE | Facility: OTHER | Age: 68
End: 2023-05-08
Attending: FAMILY MEDICINE
Payer: MEDICARE

## 2023-05-08 DIAGNOSIS — E29.1 HYPOGONADISM IN MALE: Primary | ICD-10-CM

## 2023-05-08 PROCEDURE — 96372 THER/PROPH/DIAG INJ SC/IM: CPT | Performed by: FAMILY MEDICINE

## 2023-05-08 PROCEDURE — 250N000011 HC RX IP 250 OP 636: Mod: JW | Performed by: FAMILY MEDICINE

## 2023-05-08 RX ADMIN — TESTOSTERONE CYPIONATE 100 MG: 200 INJECTION, SOLUTION INTRAMUSCULAR at 11:03

## 2023-05-08 NOTE — PROGRESS NOTES
Specialty Medication: Testosterone    Verified patient's first and last name, and . Patient stated reason for visit today is to receive a Testosterone injection. Patient denied any concerns with previous injections. Testosterone prepared and administered as ordered. Administration of medication documented in MAR (see MAR for further information regarding dose, lot #, NDC #, expiration date). Patient encouraged to wait in lobby for 15 minutes post-injection and notify staff immediately of any reaction.     MADISON IRELAND RN ....................  2023   11:25 AM

## 2023-05-19 RX ORDER — TESTOSTERONE CYPIONATE 200 MG/ML
100 INJECTION, SOLUTION INTRAMUSCULAR
Status: COMPLETED | OUTPATIENT
Start: 2023-05-22 | End: 2023-05-22

## 2023-05-22 ENCOUNTER — ALLIED HEALTH/NURSE VISIT (OUTPATIENT)
Dept: FAMILY MEDICINE | Facility: OTHER | Age: 68
End: 2023-05-22
Attending: FAMILY MEDICINE
Payer: MEDICARE

## 2023-05-22 DIAGNOSIS — E29.1 HYPOGONADISM IN MALE: Primary | ICD-10-CM

## 2023-05-22 PROCEDURE — 250N000011 HC RX IP 250 OP 636: Mod: JW | Performed by: FAMILY MEDICINE

## 2023-05-22 PROCEDURE — 96372 THER/PROPH/DIAG INJ SC/IM: CPT | Performed by: FAMILY MEDICINE

## 2023-05-22 RX ADMIN — TESTOSTERONE CYPIONATE 100 MG: 200 INJECTION, SOLUTION INTRAMUSCULAR at 07:42

## 2023-05-26 ASSESSMENT — ANXIETY QUESTIONNAIRES
1. FEELING NERVOUS, ANXIOUS, OR ON EDGE: NOT AT ALL
7. FEELING AFRAID AS IF SOMETHING AWFUL MIGHT HAPPEN: NOT AT ALL
3. WORRYING TOO MUCH ABOUT DIFFERENT THINGS: NOT AT ALL
4. TROUBLE RELAXING: NOT AT ALL
GAD7 TOTAL SCORE: 0
GAD7 TOTAL SCORE: 0
6. BECOMING EASILY ANNOYED OR IRRITABLE: NOT AT ALL
7. FEELING AFRAID AS IF SOMETHING AWFUL MIGHT HAPPEN: NOT AT ALL
2. NOT BEING ABLE TO STOP OR CONTROL WORRYING: NOT AT ALL
GAD7 TOTAL SCORE: 0
5. BEING SO RESTLESS THAT IT IS HARD TO SIT STILL: NOT AT ALL

## 2023-06-02 ENCOUNTER — OFFICE VISIT (OUTPATIENT)
Dept: FAMILY MEDICINE | Facility: OTHER | Age: 68
End: 2023-06-02
Attending: FAMILY MEDICINE
Payer: COMMERCIAL

## 2023-06-02 VITALS
SYSTOLIC BLOOD PRESSURE: 122 MMHG | DIASTOLIC BLOOD PRESSURE: 64 MMHG | HEART RATE: 67 BPM | RESPIRATION RATE: 14 BRPM | BODY MASS INDEX: 25.07 KG/M2 | WEIGHT: 153 LBS | TEMPERATURE: 97 F | OXYGEN SATURATION: 98 %

## 2023-06-02 DIAGNOSIS — F32.A DEPRESSION, UNSPECIFIED DEPRESSION TYPE: Primary | ICD-10-CM

## 2023-06-02 PROCEDURE — G0463 HOSPITAL OUTPT CLINIC VISIT: HCPCS

## 2023-06-02 PROCEDURE — 99213 OFFICE O/P EST LOW 20 MIN: CPT | Performed by: FAMILY MEDICINE

## 2023-06-02 ASSESSMENT — ENCOUNTER SYMPTOMS
ABDOMINAL PAIN: 0
COLOR CHANGE: 0
HALLUCINATIONS: 0
ARTHRALGIAS: 0
DIZZINESS: 0
WOUND: 0
NUMBNESS: 0
HEADACHES: 0
ACTIVITY CHANGE: 0
FACIAL ASYMMETRY: 0
PALPITATIONS: 0
APPETITE CHANGE: 0
ABDOMINAL DISTENTION: 0
DYSPHORIC MOOD: 0
PARESTHESIAS: 0
HYPERACTIVE: 0
LIGHT-HEADEDNESS: 0
ADENOPATHY: 0
EYE DISCHARGE: 0
APNEA: 0
AGITATION: 0
DECREASED CONCENTRATION: 0
CONFUSION: 0
NERVOUS/ANXIOUS: 0
MYALGIAS: 0
EYE ITCHING: 0

## 2023-06-02 ASSESSMENT — ANXIETY QUESTIONNAIRES: GAD7 TOTAL SCORE: 0

## 2023-06-02 ASSESSMENT — PATIENT HEALTH QUESTIONNAIRE - PHQ9
10. IF YOU CHECKED OFF ANY PROBLEMS, HOW DIFFICULT HAVE THESE PROBLEMS MADE IT FOR YOU TO DO YOUR WORK, TAKE CARE OF THINGS AT HOME, OR GET ALONG WITH OTHER PEOPLE: SOMEWHAT DIFFICULT
SUM OF ALL RESPONSES TO PHQ QUESTIONS 1-9: 3
SUM OF ALL RESPONSES TO PHQ QUESTIONS 1-9: 3

## 2023-06-02 ASSESSMENT — PAIN SCALES - GENERAL: PAINLEVEL: MILD PAIN (3)

## 2023-06-02 NOTE — NURSING NOTE
"Chief Complaint   Patient presents with     Recheck Medication       Initial /64   Pulse 67   Temp 97  F (36.1  C) (Tympanic)   Resp 14   Wt 69.4 kg (153 lb)   SpO2 98%   BMI 25.07 kg/m   Estimated body mass index is 25.07 kg/m  as calculated from the following:    Height as of 2/16/23: 1.664 m (5' 5.5\").    Weight as of this encounter: 69.4 kg (153 lb).  Medication Reconciliation: complete    FOOD SECURITY SCREENING QUESTIONS  Hunger Vital Signs:  Within the past 12 months we worried whether our food would run out before we got money to buy more. Never  Within the past 12 months the food we bought just didn't last and we didn't have money to get more. Never  Marilu Hill LPN 6/2/2023 8:37 AM        "

## 2023-06-02 NOTE — PROGRESS NOTES
"  Assessment & Plan     (F32.A) Depression, unspecified depression type  (primary encounter diagnosis)  Comment: Patient reports drastic improvement in mood since starting Wellbutrin 1 month ago.  Patient rated his mood at 2/10 before starting Wellbutrin and currently rates his mood at 8-9/10.  Patient PHQ-9 has improved from 10-3 since starting Wellbutrin.  Patient declines any suicidal ideation, self-injurious behavior, homicidal ideation.    Plan: Continue Wellbutrin 150 mg tablet every morning.             BMI:   Estimated body mass index is 25.07 kg/m  as calculated from the following:    Height as of 2/16/23: 1.664 m (5' 5.5\").    Weight as of this encounter: 69.4 kg (153 lb).   Weight management plan: Discussed healthy diet and exercise guidelines    Regular exercise    Return in about 3 months (around 9/2/2023) for Routine preventive, with me.  Patient was seen and examined by me as well as Larry Quevedo, MS3    Andres Flor MD  [facility]    Subjective   Solo is a 67 year old, presenting for the following health issues:  Recheck Medication        6/2/2023     8:34 AM   Additional Questions   Roomed by MIRZA Michel   Accompanied by Self         6/2/2023     8:34 AM   Patient Reported Additional Medications   Patient reports taking the following new medications N/A     Patient reports he is doing very well since starting Wellbutrin.  Patient says \"it was almost like a miracle how well I felt immediately after starting Wellbutrin.\"Patient rates his mood at 8-9/10, improved from 2/10 before starting the region.  Patient reports he has not felt this well in a long time and his increased motivation has been helping him replace his cedar shingle roof.  Patient reports never experiencing self-injurious behavior, suicidal ideation, homicidal ideation.  Patient reports he has not smoked over 100 cigarettes in his lifetime and therefore should not be flagged for AAA screening.    History of " Present Illness       Mental Health Follow-up:  Patient presents to follow-up on Depression & Anxiety.Patient's depression since last visit has been:  Better  The patient is not having other symptoms associated with depression.  Patient's anxiety since last visit has been:  Better  The patient is not having other symptoms associated with anxiety.  Any significant life events: No  Patient is not feeling anxious or having panic attacks.  Patient has no concerns about alcohol or drug use.    He eats 2-3 servings of fruits and vegetables daily.He consumes 0 sweetened beverage(s) daily.He exercises with enough effort to increase his heart rate 9 or less minutes per day.  He exercises with enough effort to increase his heart rate 3 or less days per week.   He is taking medications regularly.    Today's PHQ-9         PHQ-9 Total Score: 3    PHQ-9 Q9 Thoughts of better off dead/self-harm past 2 weeks :   Not at all    How difficult have these problems made it for you to do your work, take care of things at home, or get along with other people: Somewhat difficult  Today's BLAKE-7 Score: 0         1/30/2023    11:27 AM 4/20/2023    11:13 AM 6/2/2023     8:27 AM   PHQ   PHQ-9 Total Score 0 10 3   Q9: Thoughts of better off dead/self-harm past 2 weeks Not at all Not at all Not at all                 Review of Systems   Constitutional: Negative for activity change and appetite change.   HENT: Negative for congestion, dental problem, drooling and ear discharge.    Eyes: Negative for discharge and itching.   Respiratory: Negative for apnea.    Cardiovascular: Negative for chest pain, palpitations and peripheral edema.   Gastrointestinal: Negative for abdominal distention and abdominal pain.   Endocrine: Negative for cold intolerance and heat intolerance.   Musculoskeletal: Negative for arthralgias and myalgias.   Skin: Negative for color change and wound.   Neurological: Negative for dizziness, facial asymmetry, light-headedness,  numbness, headaches and paresthesias.   Hematological: Negative for adenopathy.   Psychiatric/Behavioral: Negative for agitation, behavioral problems, confusion, decreased concentration, dysphoric mood, hallucinations and mood changes. The patient is not nervous/anxious and is not hyperactive.             Objective    /64   Pulse 67   Temp 97  F (36.1  C) (Tympanic)   Resp 14   Wt 69.4 kg (153 lb)   SpO2 98%   BMI 25.07 kg/m    Body mass index is 25.07 kg/m .  Physical Exam  HENT:      Head: Normocephalic.      Mouth/Throat:      Mouth: Mucous membranes are moist.      Pharynx: Oropharynx is clear.   Cardiovascular:      Rate and Rhythm: Normal rate and regular rhythm.      Pulses: Normal pulses.      Heart sounds: Normal heart sounds.   Pulmonary:      Effort: Pulmonary effort is normal.      Breath sounds: Normal breath sounds.   Abdominal:      General: Abdomen is flat.   Musculoskeletal:      Cervical back: Normal range of motion.   Skin:     General: Skin is warm.   Neurological:      General: No focal deficit present.      Mental Status: He is alert. Mental status is at baseline.   Psychiatric:         Mood and Affect: Mood normal.         Thought Content: Thought content normal.         Judgment: Judgment normal.                ----- Services Performed by a MEDICAL STUDENT in Presence of RESIDENT/FELLOW Physician-------      Larry Quevedo on 6/2/2023 at 8:48 AM

## 2023-07-06 ENCOUNTER — LAB (OUTPATIENT)
Dept: LAB | Facility: OTHER | Age: 68
End: 2023-07-06
Payer: MEDICARE

## 2023-07-06 DIAGNOSIS — E34.9 TESTOSTERONE DEFICIENCY: ICD-10-CM

## 2023-07-06 LAB
HCT VFR BLD AUTO: 49.1 % (ref 40–53)
HGB BLD-MCNC: 15.9 G/DL (ref 13.3–17.7)

## 2023-07-06 PROCEDURE — 84403 ASSAY OF TOTAL TESTOSTERONE: CPT | Mod: ZL

## 2023-07-06 PROCEDURE — 36415 COLL VENOUS BLD VENIPUNCTURE: CPT | Mod: ZL

## 2023-07-06 PROCEDURE — 85014 HEMATOCRIT: CPT | Mod: ZL

## 2023-07-09 LAB — TESTOST SERPL-MCNC: 173 NG/DL (ref 240–950)

## 2023-07-10 ENCOUNTER — MEDICAL CORRESPONDENCE (OUTPATIENT)
Dept: HEALTH INFORMATION MANAGEMENT | Facility: OTHER | Age: 68
End: 2023-07-10
Payer: COMMERCIAL

## 2023-07-12 DIAGNOSIS — E29.1 HYPOGONADISM IN MALE: Primary | ICD-10-CM

## 2023-07-12 NOTE — PROGRESS NOTES
Order received for testosterone cypionate 100 mg once. Ordering provider is LILI Abdi, with [facility]. Routing order to PCP to be signed as ordering provider is from outside facility. Order sent to be scanned into chart. Financial approval request faxed to [facility]. Once notice of approval is received, patient will be contacted to schedule appointment.     MADISON IRELAND RN on 7/12/2023 at 3:05 PM

## 2023-07-13 RX ORDER — TESTOSTERONE CYPIONATE 200 MG/ML
100 INJECTION, SOLUTION INTRAMUSCULAR ONCE
Status: COMPLETED | OUTPATIENT
Start: 2023-07-13 | End: 2023-07-20

## 2023-07-18 NOTE — PROGRESS NOTES
Per DAG[employer], no prior auth required. Patient scheduled for injection on 7/20.    MADISON IRELAND RN on 7/18/2023 at 10:34 AM

## 2023-07-20 ENCOUNTER — ALLIED HEALTH/NURSE VISIT (OUTPATIENT)
Dept: FAMILY MEDICINE | Facility: OTHER | Age: 68
End: 2023-07-20
Attending: FAMILY MEDICINE
Payer: MEDICARE

## 2023-07-20 DIAGNOSIS — E29.1 HYPOGONADISM IN MALE: Primary | ICD-10-CM

## 2023-07-20 PROCEDURE — 250N000011 HC RX IP 250 OP 636: Performed by: FAMILY MEDICINE

## 2023-07-20 PROCEDURE — 96372 THER/PROPH/DIAG INJ SC/IM: CPT | Performed by: FAMILY MEDICINE

## 2023-07-20 RX ADMIN — TESTOSTERONE CYPIONATE 100 MG: 200 INJECTION, SOLUTION INTRAMUSCULAR at 07:41

## 2023-07-20 NOTE — PROGRESS NOTES
Clinic Administered Medication Documentation    Testosterone Documentation     Was this medication supplied by the patient? No.   Is there an active order (written within the past 365 days, with administrations remaining, not ) in the chart? Yes.   Prior to injection, verified patient identity using patient's name and date of birth. Medication was administered. Please see MAR and medication order for additional information. Patient instructed to remain in clinic for 15 minutes and report any adverse reaction to staff immediately but patient declined.  Vial/Syringe: Single dose vial. Was entire vial of medication used? No, The remainder 100MG of 200MG was discarded as unavoidable waste.  Patient has no refills remaining. This order was for one time dose. Discussed with patient who will contact ordering provider's office to request order for more doses if clinic is not yet set up for Aveed injections.      MADISON IRELAND RN on 2023 at 8:09 AM

## 2023-07-24 DIAGNOSIS — E29.1 HYPOGONADISM MALE: Primary | ICD-10-CM

## 2023-07-31 ENCOUNTER — LAB (OUTPATIENT)
Dept: LAB | Facility: OTHER | Age: 68
End: 2023-07-31
Attending: NURSE PRACTITIONER
Payer: MEDICARE

## 2023-07-31 DIAGNOSIS — R79.89 LOW TESTOSTERONE: ICD-10-CM

## 2023-07-31 LAB
HCT VFR BLD AUTO: 44.4 % (ref 40–53)
HGB BLD-MCNC: 14.4 G/DL (ref 13.3–17.7)

## 2023-07-31 PROCEDURE — 84403 ASSAY OF TOTAL TESTOSTERONE: CPT | Mod: ZL

## 2023-07-31 PROCEDURE — 85018 HEMOGLOBIN: CPT | Mod: ZL

## 2023-07-31 PROCEDURE — 36415 COLL VENOUS BLD VENIPUNCTURE: CPT | Mod: ZL

## 2023-08-01 DIAGNOSIS — M96.1 FAILED BACK SYNDROME OF LUMBAR SPINE: ICD-10-CM

## 2023-08-01 DIAGNOSIS — M51.379 DEGENERATION OF LUMBAR OR LUMBOSACRAL INTERVERTEBRAL DISC: ICD-10-CM

## 2023-08-01 RX ORDER — TRAMADOL HYDROCHLORIDE 50 MG/1
50 TABLET ORAL EVERY 6 HOURS PRN
Qty: 60 TABLET | Refills: 0 | Status: SHIPPED | OUTPATIENT
Start: 2023-08-01 | End: 2023-09-01

## 2023-08-01 NOTE — TELEPHONE ENCOUNTER
Reason for call: Medication or medication refill    Name of medication requested: tramadol     Are you out of the medication? 2 pills left     What pharmacy do you use? [facility]    Preferred method for responding to this message: Telephone Call    Phone number patient can be reached at: Cell number on file:    Telephone Information:   Mobile [phone]       If we cannot reach you directly, may we leave a detailed response at the number you provided? Yes    TJP is not available, can another provider fill or see patient to get a refill?      Swati Lizarraga on 8/1/2023 at 9:45 AM

## 2023-08-01 NOTE — TELEPHONE ENCOUNTER
Requested Prescriptions   Pending Prescriptions Disp Refills    traMADol (ULTRAM) 50 MG tablet 60 tablet 5     Sig: Take 1 tablet (50 mg) by mouth every 6 hours as needed for severe pain   Last Prescription Date:   4/20/23  Last Fill Qty/Refills:         60, R-5    Last Office Visit:              6/2/23   Future Office visit:             Next 5 appointments (look out 90 days)      Sep 01, 2023 10:20 AM  PHYSICAL with Andres Flor MD  [facility] ([facility] ) [address]  [place] [address]  [phone]          Per LOV note:    Return in about 3 months (around 9/2/2023) for Routine preventive, with me.    Routing to covering provider for refill consideration, as PCP/provider is out of clinic >48 hours or Pt is completely out of medication and provider is out of the clinic today.    Andra Morataya RN .............. 8/1/2023  9:52 AM

## 2023-08-02 LAB — TESTOST SERPL-MCNC: 221 NG/DL (ref 240–950)

## 2023-08-30 ASSESSMENT — ENCOUNTER SYMPTOMS
ABDOMINAL PAIN: 0
WEAKNESS: 0
ARTHRALGIAS: 0
CHILLS: 0
HEMATURIA: 0
PARESTHESIAS: 0
EYE PAIN: 0
JOINT SWELLING: 0
DIZZINESS: 0
HEADACHES: 0
SHORTNESS OF BREATH: 0
PALPITATIONS: 0
DIARRHEA: 0
HEARTBURN: 0
FEVER: 0
MYALGIAS: 1
DYSURIA: 0
NAUSEA: 0
COUGH: 0
HEMATOCHEZIA: 0
NERVOUS/ANXIOUS: 0
FREQUENCY: 0
CONSTIPATION: 0
SORE THROAT: 0

## 2023-08-30 ASSESSMENT — ANXIETY QUESTIONNAIRES
4. TROUBLE RELAXING: NOT AT ALL
1. FEELING NERVOUS, ANXIOUS, OR ON EDGE: NOT AT ALL
7. FEELING AFRAID AS IF SOMETHING AWFUL MIGHT HAPPEN: NOT AT ALL
8. IF YOU CHECKED OFF ANY PROBLEMS, HOW DIFFICULT HAVE THESE MADE IT FOR YOU TO DO YOUR WORK, TAKE CARE OF THINGS AT HOME, OR GET ALONG WITH OTHER PEOPLE?: NOT DIFFICULT AT ALL
2. NOT BEING ABLE TO STOP OR CONTROL WORRYING: NOT AT ALL
3. WORRYING TOO MUCH ABOUT DIFFERENT THINGS: NOT AT ALL
6. BECOMING EASILY ANNOYED OR IRRITABLE: NOT AT ALL
IF YOU CHECKED OFF ANY PROBLEMS ON THIS QUESTIONNAIRE, HOW DIFFICULT HAVE THESE PROBLEMS MADE IT FOR YOU TO DO YOUR WORK, TAKE CARE OF THINGS AT HOME, OR GET ALONG WITH OTHER PEOPLE: NOT DIFFICULT AT ALL
GAD7 TOTAL SCORE: 0
7. FEELING AFRAID AS IF SOMETHING AWFUL MIGHT HAPPEN: NOT AT ALL
5. BEING SO RESTLESS THAT IT IS HARD TO SIT STILL: NOT AT ALL
GAD7 TOTAL SCORE: 0

## 2023-08-30 ASSESSMENT — ACTIVITIES OF DAILY LIVING (ADL): CURRENT_FUNCTION: NO ASSISTANCE NEEDED

## 2023-09-01 ENCOUNTER — OFFICE VISIT (OUTPATIENT)
Dept: FAMILY MEDICINE | Facility: OTHER | Age: 68
End: 2023-09-01
Attending: FAMILY MEDICINE
Payer: COMMERCIAL

## 2023-09-01 VITALS
OXYGEN SATURATION: 98 % | HEART RATE: 70 BPM | BODY MASS INDEX: 24.46 KG/M2 | DIASTOLIC BLOOD PRESSURE: 74 MMHG | RESPIRATION RATE: 16 BRPM | HEIGHT: 66 IN | TEMPERATURE: 97.5 F | WEIGHT: 152.2 LBS | SYSTOLIC BLOOD PRESSURE: 130 MMHG

## 2023-09-01 DIAGNOSIS — F33.0 DEPRESSION, MAJOR, RECURRENT, MILD (H): ICD-10-CM

## 2023-09-01 DIAGNOSIS — M96.1 FAILED BACK SYNDROME OF LUMBAR SPINE: ICD-10-CM

## 2023-09-01 DIAGNOSIS — M51.379 DEGENERATION OF LUMBAR OR LUMBOSACRAL INTERVERTEBRAL DISC: ICD-10-CM

## 2023-09-01 DIAGNOSIS — Z00.00 ENCOUNTER FOR MEDICARE ANNUAL WELLNESS EXAM: Primary | ICD-10-CM

## 2023-09-01 PROCEDURE — 99213 OFFICE O/P EST LOW 20 MIN: CPT | Mod: 25 | Performed by: FAMILY MEDICINE

## 2023-09-01 PROCEDURE — G0463 HOSPITAL OUTPT CLINIC VISIT: HCPCS

## 2023-09-01 PROCEDURE — G0439 PPPS, SUBSEQ VISIT: HCPCS | Performed by: FAMILY MEDICINE

## 2023-09-01 RX ORDER — TRAMADOL HYDROCHLORIDE 50 MG/1
50 TABLET ORAL EVERY 6 HOURS PRN
Qty: 60 TABLET | Refills: 5 | Status: SHIPPED | OUTPATIENT
Start: 2023-09-01 | End: 2024-03-15

## 2023-09-01 RX ORDER — TESTOSTERONE UNDECANOATE 250 MG/ML
INJECTION INTRAMUSCULAR
COMMUNITY
Start: 2023-08-22

## 2023-09-01 RX ORDER — SERTRALINE HYDROCHLORIDE 100 MG/1
100 TABLET, FILM COATED ORAL DAILY
Qty: 90 TABLET | Refills: 4 | Status: SHIPPED | OUTPATIENT
Start: 2023-09-01 | End: 2024-08-16

## 2023-09-01 ASSESSMENT — ENCOUNTER SYMPTOMS
CHILLS: 0
WEAKNESS: 0
COUGH: 0
SORE THROAT: 0
SHORTNESS OF BREATH: 0
DIARRHEA: 0
HEARTBURN: 0
PARESTHESIAS: 0
EYE PAIN: 0
HEADACHES: 0
NAUSEA: 0
DIZZINESS: 0
FEVER: 0
CONSTIPATION: 0
NERVOUS/ANXIOUS: 0
JOINT SWELLING: 0
FREQUENCY: 0
HEMATURIA: 0
DYSURIA: 0
HEMATOCHEZIA: 0
PALPITATIONS: 0
ABDOMINAL PAIN: 0
ARTHRALGIAS: 0
MYALGIAS: 1

## 2023-09-01 ASSESSMENT — PATIENT HEALTH QUESTIONNAIRE - PHQ9
10. IF YOU CHECKED OFF ANY PROBLEMS, HOW DIFFICULT HAVE THESE PROBLEMS MADE IT FOR YOU TO DO YOUR WORK, TAKE CARE OF THINGS AT HOME, OR GET ALONG WITH OTHER PEOPLE: SOMEWHAT DIFFICULT
SUM OF ALL RESPONSES TO PHQ QUESTIONS 1-9: 2
SUM OF ALL RESPONSES TO PHQ QUESTIONS 1-9: 2

## 2023-09-01 ASSESSMENT — ACTIVITIES OF DAILY LIVING (ADL): CURRENT_FUNCTION: NO ASSISTANCE NEEDED

## 2023-09-01 NOTE — NURSING NOTE
"Chief Complaint   Patient presents with    Medicare Visit       Initial /74   Pulse 70   Temp 97.5  F (36.4  C) (Tympanic)   Resp 16   Ht 1.664 m (5' 5.5\")   Wt 69 kg (152 lb 3.2 oz)   SpO2 98%   BMI 24.94 kg/m   Estimated body mass index is 24.94 kg/m  as calculated from the following:    Height as of this encounter: 1.664 m (5' 5.5\").    Weight as of this encounter: 69 kg (152 lb 3.2 oz).  Medication Reconciliation: complete    FOOD SECURITY SCREENING QUESTIONS  Hunger Vital Signs:  Within the past 12 months we worried whether our food would run out before we got money to buy more. Never  Within the past 12 months the food we bought just didn't last and we didn't have money to get more. Never  Marilu Hill LPN 9/1/2023 10:26 AM          "

## 2023-09-01 NOTE — PROGRESS NOTES
"SUBJECTIVE:   Solo is a 68 year old who presents for Preventive Visit.      9/1/2023    10:21 AM   Additional Questions   Roomed by MIRZA Michel   Accompanied by Self         9/1/2023    10:21 AM   Patient Reported Additional Medications   Patient reports taking the following new medications N/A       Are you in the first 12 months of your Medicare coverage?  No    Healthy Habits:     In general, how would you rate your overall health?  Fair    Frequency of exercise:  1 day/week    Duration of exercise:  15-30 minutes    Do you usually eat at least 4 servings of fruit and vegetables a day, include whole grains    & fiber and avoid regularly eating high fat or \"junk\" foods?  Yes    Taking medications regularly:  Yes    Medication side effects:  Not applicable    Ability to successfully perform activities of daily living:  No assistance needed    Home Safety:  No safety concerns identified    Hearing Impairment:  Feel that people are mumbling or not speaking clearly    In the past 6 months, have you been bothered by leaking of urine?  No    In general, how would you rate your overall mental or emotional health?  Good    Additional concerns today:  No        Have you ever done Advance Care Planning? (For example, a Health Directive, POLST, or a discussion with a medical provider or your loved ones about your wishes): Yes, patient states has an Advance Care Planning document and will bring a copy to the clinic.       Fall risk  Fallen 2 or more times in the past year?: No  Any fall with injury in the past year?: No    Cognitive Screening   1) Repeat 3 items (Leader, Season, Winter)    2) Clock draw: NORMAL  3) 3 item recall: Recalls 1 item  Results: NORMAL clock, 1-2 items recalled: COGNITIVE IMPAIRMENT LESS LIKELY    Mini-CogTM Copyright VANESA Bob. Licensed by the author for use in [facility]; reprinted with permission (john@.[facility]). All rights reserved.      Do you have sleep apnea, excessive snoring or " daytime drowsiness? : no    Reviewed and updated as needed this visit by clinical staff   Tobacco  Allergies  Meds   Med Hx  Surg Hx  Fam Hx  Soc Hx        Reviewed and updated as needed this visit by Provider                 Social History     Tobacco Use    Smoking status: Never     Passive exposure: Never    Smokeless tobacco: Never   Substance Use Topics    Alcohol use: No             8/30/2023     3:20 PM   Alcohol Use   Prescreen: >3 drinks/day or >7 drinks/week? No     Do you have a current opioid prescription? Yes   How severe is your pain on a scale from 1-10? 3/10           9/1/2023    10:41 AM   Opioid Risk Tool   Alcohol 0   Illegal Drugs 3   Prescrition Drugs 0   Alcohol 0   Illegal Drugs 0   Prescription Drugs 0   Is the patient age 16-45 0   Psychological Disease: Attention-Deficit/Hyperactivity Disorder; Obsessive Compulsive Disorder; Bipolar Disorder; Schizophrenia 0   Depression 1   Total Score 4     Low Risk (0-3)  Moderate Risk (4-7)  High Risk (>8)  Do you use any other controlled substances or medications that are not prescribed by a provider? None              Current providers sharing in care for this patient include:    Patient Care Team:  Andres Flor MD as PCP - General (Family Practice)  Andres Flor MD as Assigned PCP  Peter Turner MD as Assigned Surgical Provider  Andres Flor MD as Assigned Pain Medication Provider    The following health maintenance items are reviewed in Epic and correct as of today:  Health Maintenance   Topic Date Due    HEPATITIS C SCREENING  Never done    ZOSTER IMMUNIZATION (2 of 2) 12/25/2014    MEDICARE ANNUAL WELLNESS VISIT  Never done    LIPID  01/11/2023    COVID-19 Vaccine (5 - Moderna series) 02/21/2023    INFLUENZA VACCINE (1) 09/01/2023    ADVANCE CARE PLANNING  09/27/2023    TSH W/FREE T4 REFLEX  10/21/2023    URINE DRUG SCREEN  10/21/2023    TREATMENT AGREEMENT FOR CHRONIC PAIN MANAGEMENT  10/21/2023    BLAKE ASSESSMENT  09/01/2024    FALL  "RISK ASSESSMENT  09/01/2024    PHQ-9  09/01/2024    COLORECTAL CANCER SCREENING  10/27/2025    DTAP/TDAP/TD IMMUNIZATION (3 - Td or Tdap) 02/16/2033    DEPRESSION ACTION PLAN  Completed    Pneumococcal Vaccine: 65+ Years  Completed    IPV IMMUNIZATION  Aged Out    HPV IMMUNIZATION  Aged Out    MENINGITIS IMMUNIZATION  Aged Out    AORTIC ANEURYSM SCREENING (SYSTEM ASSIGNED)  Discontinued               Review of Systems   Constitutional:  Negative for chills and fever.   HENT:  Positive for hearing loss. Negative for congestion, ear pain and sore throat.    Eyes:  Negative for pain and visual disturbance.   Respiratory:  Negative for cough and shortness of breath.    Cardiovascular:  Negative for chest pain, palpitations and peripheral edema.   Gastrointestinal:  Negative for abdominal pain, constipation, diarrhea, heartburn, hematochezia and nausea.   Genitourinary:  Negative for dysuria, frequency, genital sores, hematuria, impotence, penile discharge and urgency.   Musculoskeletal:  Positive for myalgias. Negative for arthralgias and joint swelling.   Skin:  Negative for rash.   Neurological:  Negative for dizziness, weakness, headaches and paresthesias.   Psychiatric/Behavioral:  Negative for mood changes. The patient is not nervous/anxious.          Wants a refill on tramadol and his zoloft. Feels both are working well. Pain is 3/10 without side effects.    Feels his depression is much improved. No significant side effects at all from it.     OBJECTIVE:   /74   Pulse 70   Temp 97.5  F (36.4  C) (Tympanic)   Resp 16   Ht 1.664 m (5' 5.5\")   Wt 69 kg (152 lb 3.2 oz)   SpO2 98%   BMI 24.94 kg/m   Estimated body mass index is 24.94 kg/m  as calculated from the following:    Height as of this encounter: 1.664 m (5' 5.5\").    Weight as of this encounter: 69 kg (152 lb 3.2 oz).  Physical Exam  GENERAL: healthy, alert and no distress  EYES: Eyes grossly normal to inspection, PERRL and conjunctivae and " sclerae normal  HENT: ear canals and TM's normal, nose and mouth without ulcers or lesions  NECK: no adenopathy, no asymmetry, masses, or scars and thyroid normal to palpation  RESP: lungs clear to auscultation - no rales, rhonchi or wheezes  CV: regular rate and rhythm, normal S1 S2, no S3 or S4, no murmur, click or rub, no peripheral edema and peripheral pulses strong  ABDOMEN: soft, nontender, no hepatosplenomegaly, no masses and bowel sounds normal  MS: no gross musculoskeletal defects noted, no edema  SKIN: no suspicious lesions or rashes  NEURO: Normal strength and tone, mentation intact and speech normal  PSYCH: mentation appears normal, affect normal/bright    Diagnostic Test Results:  Labs reviewed in Epic    ASSESSMENT / PLAN:   (Z00.00) Encounter for Medicare annual wellness exam  (primary encounter diagnosis)  Comment:    Plan:      (M51.37) Degeneration of lumbar or lumbosacral intervertebral disc  Comment: stable  Plan: traMADol (ULTRAM) 50 MG tablet         reviewed. Low risk for misuse or diversion. Refilled for 6 months. On low dose    (M96.1) Failed back syndrome of lumbar spine  Comment:    Plan: traMADol (ULTRAM) 50 MG tablet             (F33.0) Depression, major, recurrent, mild (H)  Comment: well controlled  Plan: sertraline (ZOLOFT) 100 MG tablet        Refilled           COUNSELING:  Reviewed preventive health counseling, as reflected in patient instructions       Regular exercise       Healthy diet/nutrition        He reports that he has never smoked. He has never been exposed to tobacco smoke. He has never used smokeless tobacco.      Appropriate preventive services were discussed with this patient, including applicable screening as appropriate for cardiovascular disease, diabetes, osteopenia/osteoporosis, and glaucoma.  As appropriate for age/gender, discussed screening for colorectal cancer, prostate cancer, breast cancer, and cervical cancer. Checklist reviewing preventive services  available has been given to the patient.    Reviewed patients plan of care and provided an AVS. The Basic Care Plan (routine screening as documented in Health Maintenance) for Km meets the Care Plan requirement. This Care Plan has been established and reviewed with the Patient.          Andres Flor MD  [facility]    Identified Health Risks:  I have reviewed Opioid Use Disorder and Substance Use Disorder risk factors and made any needed referrals.   I have reviewed the current pain treatment plan including non-opioid treatment options.Answers submitted by the patient for this visit:  Patient Health Questionnaire (Submitted on 9/1/2023)  If you checked off any problems, how difficult have these problems made it for you to do your work, take care of things at home, or get along with other people?: Somewhat difficult  PHQ9 TOTAL SCORE: 2  BLAKE-7 (Submitted on 8/30/2023)  BLAKE 7 TOTAL SCORE: 0

## 2023-09-01 NOTE — PATIENT INSTRUCTIONS
Patient Education   Personalized Prevention Plan  You are due for the preventive services outlined below.  Your care team is available to assist you in scheduling these services.  If you have already completed any of these items, please share that information with your care team to update in your medical record.  Health Maintenance Due   Topic Date Due     Hepatitis C Screening  Never done     Zoster (Shingles) Vaccine (2 of 2) 12/25/2014     Cholesterol Lab  01/11/2023     COVID-19 Vaccine (5 - Moderna series) 02/21/2023     Flu Vaccine (1) 09/01/2023

## 2023-09-11 DIAGNOSIS — I10 ESSENTIAL HYPERTENSION: ICD-10-CM

## 2023-09-14 RX ORDER — LISINOPRIL/HYDROCHLOROTHIAZIDE 10-12.5 MG
TABLET ORAL
Qty: 90 TABLET | Refills: 4 | Status: SHIPPED | OUTPATIENT
Start: 2023-09-14 | End: 2024-08-16

## 2023-09-14 NOTE — TELEPHONE ENCOUNTER
"  Last Prescription Date: 7/14/23  Last Qty/Refills: 90 / 4  Last Office Visit: 9/1/23 [facility]  Future Office Visit: none noted     Requested Prescriptions   Pending Prescriptions Disp Refills    lisinopril-hydrochlorothiazide (ZESTORETIC) 10-12.5 MG tablet [Pharmacy Med Name: Lisinopril-hydroCHLOROthiazide 10-12.5 MG Oral Tablet] 90 tablet 0     Sig: Take 1 tablet by mouth once daily       Diuretics (Including Combos) Protocol Passed - 9/11/2023 10:56 AM        Passed - Blood pressure under 140/90 in past 12 months     BP Readings from Last 3 Encounters:   09/01/23 130/74   06/02/23 122/64   04/20/23 126/80           Passed - Recent (12 mo) or future (30 days) visit within the authorizing provider's specialty     Patient has had an office visit with the authorizing provider or a provider within the authorizing providers department within the previous 12 mos or has a future within next 30 days. See \"Patient Info\" tab in inbasket, or \"Choose Columns\" in Meds & Orders section of the refill encounter.              Passed - Medication is active on med list        Passed - Patient is age 18 or older        Passed - Normal serum creatinine on file in past 12 months     Recent Labs   Lab Test 10/21/22  0923   CR 1.17              Passed - Normal serum potassium on file in past 12 months     Recent Labs   Lab Test 10/21/22  0923   POTASSIUM 4.3            Passed - Normal serum sodium on file in past 12 months     Recent Labs   Lab Test 10/21/22  0923                ACE Inhibitors (Including Combos) Protocol Passed - 9/11/2023 10:56 AM        Passed - Blood pressure under 140/90 in past 12 months     BP Readings from Last 3 Encounters:   09/01/23 130/74   06/02/23 122/64   04/20/23 126/80           Passed - Recent (12 mo) or future (30 days) visit within the authorizing provider's specialty     Patient has had an office visit with the authorizing provider or a provider within the authorizing providers department within " "the previous 12 mos or has a future within next 30 days. See \"Patient Info\" tab in inbasket, or \"Choose Columns\" in Meds & Orders section of the refill encounter.          Passed - Medication is active on med list        Passed - Patient is age 18 or older        Passed - Normal serum creatinine on file in past 12 months     Recent Labs   Lab Test 10/21/22  0923   CR 1.17       Ok to refill medication if creatinine is low          Passed - Normal serum potassium on file in past 12 months     Recent Labs   Lab Test 10/21/22  0923   POTASSIUM 4.3                Routing to PCP/provider for refill consideration/determination for appointment.   Malia Hernadez RN ....................  9/14/2023   2:38 PM    "

## 2023-12-02 DIAGNOSIS — E03.8 OTHER SPECIFIED HYPOTHYROIDISM: ICD-10-CM

## 2023-12-05 RX ORDER — LEVOTHYROXINE SODIUM 50 UG/1
50 TABLET ORAL DAILY
Qty: 90 TABLET | Refills: 0 | Status: SHIPPED | OUTPATIENT
Start: 2023-12-05 | End: 2023-12-06

## 2023-12-06 DIAGNOSIS — E03.8 OTHER SPECIFIED HYPOTHYROIDISM: ICD-10-CM

## 2023-12-06 RX ORDER — LEVOTHYROXINE SODIUM 50 UG/1
50 TABLET ORAL DAILY
Qty: 90 TABLET | Refills: 0 | Status: SHIPPED | OUTPATIENT
Start: 2023-12-06 | End: 2024-03-01

## 2024-01-29 ENCOUNTER — TELEPHONE (OUTPATIENT)
Dept: FAMILY MEDICINE | Facility: OTHER | Age: 69
End: 2024-01-29
Payer: COMMERCIAL

## 2024-01-29 DIAGNOSIS — E29.1 HYPOGONADISM IN MALE: Primary | ICD-10-CM

## 2024-01-29 NOTE — TELEPHONE ENCOUNTER
Reason for call: Request for a referral.    Referral requested for what concern?  Urology, Dr. Arreguin Hypogonadism in male     If no,  Where do you want to go?   [facility]      Preferred method for responding to this message: Telephone Call    Phone number patient can be reached at? Cell number on file:    Telephone Information:   Mobile [phone]        If we can't reach you directly, may we leave a detailed response at the number you provided? Yes      Charlotte Victoria on 1/29/2024 at 2:41 PM

## 2024-02-29 DIAGNOSIS — E03.8 OTHER SPECIFIED HYPOTHYROIDISM: ICD-10-CM

## 2024-03-04 RX ORDER — LEVOTHYROXINE SODIUM 50 UG/1
50 TABLET ORAL DAILY
Qty: 90 TABLET | Refills: 0 | Status: SHIPPED | OUTPATIENT
Start: 2024-03-04 | End: 2024-03-15

## 2024-03-09 ENCOUNTER — TELEPHONE (OUTPATIENT)
Dept: FAMILY MEDICINE | Facility: OTHER | Age: 69
End: 2024-03-09
Payer: COMMERCIAL

## 2024-03-09 DIAGNOSIS — M51.379 DEGENERATION OF LUMBAR OR LUMBOSACRAL INTERVERTEBRAL DISC: ICD-10-CM

## 2024-03-09 DIAGNOSIS — M96.1 FAILED BACK SYNDROME OF LUMBAR SPINE: ICD-10-CM

## 2024-03-12 RX ORDER — TRAMADOL HYDROCHLORIDE 50 MG/1
50 TABLET ORAL EVERY 6 HOURS PRN
Qty: 60 TABLET | Refills: 0 | OUTPATIENT
Start: 2024-03-12

## 2024-03-12 NOTE — TELEPHONE ENCOUNTER
Called and spoke to Patient after verifying last name and date of birth. Pt informed of provider response. Pt transferred to scheduling line to set up appointment. Andra Morataya RN .............. 3/12/2024  10:30 AM

## 2024-03-12 NOTE — TELEPHONE ENCOUNTER
Controlled substance, needs OV with TJP or myself. Last visit 9/1/23. Also due for CSA and annual UDS.     Vanita Saenz PA-C  3/12/2024  7:35 AM

## 2024-03-15 ENCOUNTER — OFFICE VISIT (OUTPATIENT)
Dept: FAMILY MEDICINE | Facility: OTHER | Age: 69
End: 2024-03-15
Attending: PHYSICIAN ASSISTANT
Payer: COMMERCIAL

## 2024-03-15 VITALS
HEART RATE: 74 BPM | DIASTOLIC BLOOD PRESSURE: 86 MMHG | TEMPERATURE: 97.6 F | OXYGEN SATURATION: 99 % | BODY MASS INDEX: 26.22 KG/M2 | WEIGHT: 160 LBS | SYSTOLIC BLOOD PRESSURE: 138 MMHG | RESPIRATION RATE: 16 BRPM

## 2024-03-15 DIAGNOSIS — F33.0 DEPRESSION, MAJOR, RECURRENT, MILD (H): ICD-10-CM

## 2024-03-15 DIAGNOSIS — M51.379 DEGENERATION OF LUMBAR OR LUMBOSACRAL INTERVERTEBRAL DISC: ICD-10-CM

## 2024-03-15 DIAGNOSIS — M96.1 FAILED BACK SYNDROME OF LUMBAR SPINE: ICD-10-CM

## 2024-03-15 DIAGNOSIS — E03.8 OTHER SPECIFIED HYPOTHYROIDISM: ICD-10-CM

## 2024-03-15 LAB — CREAT UR-MCNC: 104 MG/DL

## 2024-03-15 PROCEDURE — 80346 BENZODIAZEPINES1-12: CPT | Mod: ZL | Performed by: PHYSICIAN ASSISTANT

## 2024-03-15 PROCEDURE — 80360 METHYLPHENIDATE: CPT | Mod: ZL | Performed by: PHYSICIAN ASSISTANT

## 2024-03-15 PROCEDURE — 99214 OFFICE O/P EST MOD 30 MIN: CPT | Performed by: PHYSICIAN ASSISTANT

## 2024-03-15 PROCEDURE — G0463 HOSPITAL OUTPT CLINIC VISIT: HCPCS | Performed by: PHYSICIAN ASSISTANT

## 2024-03-15 RX ORDER — TRAMADOL HYDROCHLORIDE 50 MG/1
50 TABLET ORAL EVERY 6 HOURS PRN
Qty: 60 TABLET | Refills: 5 | Status: SHIPPED | OUTPATIENT
Start: 2024-03-15 | End: 2024-08-16

## 2024-03-15 RX ORDER — LEVOTHYROXINE SODIUM 50 UG/1
50 TABLET ORAL DAILY
Qty: 90 TABLET | Refills: 3 | Status: SHIPPED | OUTPATIENT
Start: 2024-03-15

## 2024-03-15 RX ORDER — BUPROPION HYDROCHLORIDE 150 MG/1
150 TABLET ORAL EVERY MORNING
Qty: 90 TABLET | Refills: 4 | Status: SHIPPED | OUTPATIENT
Start: 2024-03-15

## 2024-03-15 ASSESSMENT — ANXIETY QUESTIONNAIRES
2. NOT BEING ABLE TO STOP OR CONTROL WORRYING: SEVERAL DAYS
GAD7 TOTAL SCORE: 7
GAD7 TOTAL SCORE: 7
IF YOU CHECKED OFF ANY PROBLEMS ON THIS QUESTIONNAIRE, HOW DIFFICULT HAVE THESE PROBLEMS MADE IT FOR YOU TO DO YOUR WORK, TAKE CARE OF THINGS AT HOME, OR GET ALONG WITH OTHER PEOPLE: NOT DIFFICULT AT ALL
4. TROUBLE RELAXING: MORE THAN HALF THE DAYS
1. FEELING NERVOUS, ANXIOUS, OR ON EDGE: SEVERAL DAYS
5. BEING SO RESTLESS THAT IT IS HARD TO SIT STILL: SEVERAL DAYS
7. FEELING AFRAID AS IF SOMETHING AWFUL MIGHT HAPPEN: NOT AT ALL
8. IF YOU CHECKED OFF ANY PROBLEMS, HOW DIFFICULT HAVE THESE MADE IT FOR YOU TO DO YOUR WORK, TAKE CARE OF THINGS AT HOME, OR GET ALONG WITH OTHER PEOPLE?: NOT DIFFICULT AT ALL
3. WORRYING TOO MUCH ABOUT DIFFERENT THINGS: SEVERAL DAYS
7. FEELING AFRAID AS IF SOMETHING AWFUL MIGHT HAPPEN: NOT AT ALL
6. BECOMING EASILY ANNOYED OR IRRITABLE: SEVERAL DAYS
GAD7 TOTAL SCORE: 7

## 2024-03-15 ASSESSMENT — PATIENT HEALTH QUESTIONNAIRE - PHQ9
SUM OF ALL RESPONSES TO PHQ QUESTIONS 1-9: 5
SUM OF ALL RESPONSES TO PHQ QUESTIONS 1-9: 5
10. IF YOU CHECKED OFF ANY PROBLEMS, HOW DIFFICULT HAVE THESE PROBLEMS MADE IT FOR YOU TO DO YOUR WORK, TAKE CARE OF THINGS AT HOME, OR GET ALONG WITH OTHER PEOPLE: NOT DIFFICULT AT ALL

## 2024-03-15 ASSESSMENT — PAIN SCALES - GENERAL: PAINLEVEL: MILD PAIN (3)

## 2024-03-15 NOTE — PROGRESS NOTES
Assessment & Plan       ICD-10-CM    1. Depression, major, recurrent, mild (H24)  F33.0 buPROPion (WELLBUTRIN XL) 150 MG 24 hr tablet      2. Other specified hypothyroidism  E03.8 levothyroxine (SYNTHROID/LEVOTHROID) 50 MCG tablet      3. Failed back syndrome of lumbar spine  M96.1 traMADol (ULTRAM) 50 MG tablet     Drug Confirmation Panel Urine with Creatinine      4. Degeneration of lumbar or lumbosacral intervertebral disc  M51.37 traMADol (ULTRAM) 50 MG tablet     Drug Confirmation Panel Urine with Creatinine        Mild major depression, stable on current regimen of Wellbutrin 150 mg daily.  Good coping/stabilization techniques in place.  Refills of Wellbutrin sent through to pharmacy today.  Follow-up in 6 months.  Hypothyroidism, stable.  No changes to weight, hair, skin, nails or energy levels.  Refill levothyroxine 50 mcg daily.  Continue to take first thing in the morning 30 to 60 minutes prior to eating or taking other medications.  Return precautions reviewed.  Failed back syndrome of lumbar spine, currently on tramadol and prescribed/medicinal marijuana for pain management.  Last dose of tramadol was last night as outlined below.  Updated controlled substance agreement (CSA) and urine toxicology today.  Due for follow-up in 6 months, will call to schedule visit.  See #4.  Degeneration of lumbar/lumbosacral spine, stable on current regimen of tramadol.  Maintenance of person-centered care plan - goals, personal strengths, clinical needs, desired outcomes: Ongoing.   How is patient functioning/improving/not improving with the current pain protocol / RX:   - What is patient unable to do or has difficulty completing because of pain: Prolonged activities of daily living (, caring for the home, especially outdoor work)  - How has the current pain protocol / RX helped: Allowed to participate in activities of daily living more comfortably  Adequate pain management helps to improve quality of life and  performing ADLs independently.   - Encouraged regular stretching, walking, exercise. Healthy meals and diet.     Facilitation and coordination of any necessary behavorial health treatment: Ongoing.   - Encouraged counseling and behavorial health management to help with chronic pain, and if any variable anxiety / depression symptoms develop.     Communication and care coordination between relevant practitioners furnishing care (PT/OT, complementary and integrative approaches, community-based care): Ongoing.     PDMP Review         Value Time User    State PDMP site checked  Yes 3/15/2024  2:39 PM Vanita Saenz PA-C          Last CSA Agreement:   CSA -- Patient Level:     [Media Unavailable] Controlled Substance Agreement - Opioid - Scan on 10/21/2022 11:45 AM   [Media Unavailable] Controlled Substance Agreement - Opioid - Scan on 6/29/2021  1:13 PM       Last UDS: 10/21/2022    See Patient Instructions    Return in about 6 months (around 9/15/2024) for Medication follow up - Tramadol and Wellness check.    Esau Banda is a 68 year old, presenting for the following health issues:  Recheck Medication (Tramadol)        3/15/2024     2:01 PM   Additional Questions   Roomed by Radha Early CMA   Accompanied by nobody     HPI     Depression   How are you doing with your depression since your last visit? No change  Are you having other symptoms that might be associated with depression? No  Have you had a significant life event?  No   Are you feeling anxious or having panic attacks?   No  Do you have any concerns with your use of alcohol or other drugs? No    Social History     Tobacco Use    Smoking status: Never     Passive exposure: Never    Smokeless tobacco: Never   Vaping Use    Vaping Use: Never used   Substance Use Topics    Alcohol use: No    Drug use: Never         6/2/2023     8:27 AM 9/1/2023    10:13 AM 3/15/2024     1:50 PM   PHQ   PHQ-9 Total Score 3 2 5   Q9: Thoughts of better off dead/self-harm  past 2 weeks Not at all Not at all Not at all         5/26/2023     8:16 PM 8/30/2023     3:16 PM 3/15/2024     1:51 PM   BLAKE-7 SCORE   Total Score 0 (minimal anxiety) 0 (minimal anxiety) 7 (mild anxiety)   Total Score 0 0 7     Hypothyroidism Follow-up    Since last visit, patient describes the following symptoms: Weight stable, no hair loss, no skin changes, no constipation, no loose stools    Pain History:  When did you first notice your pain? - Chronic pain: Persistent or recurrent pain lasting longer than 3 months.   Have you seen this provider for your pain in the past? Yes   Where in your body do you have pain? Back (low back)  Are you seeing anyone else for your pain? No        3/15/2024     1:50 PM   Last PHQ-9   1.  Little interest or pleasure in doing things 1   2.  Feeling down, depressed, or hopeless 0   3.  Trouble falling or staying asleep, or sleeping too much 2   4.  Feeling tired or having little energy 1   5.  Poor appetite or overeating 0   6.  Feeling bad about yourself 0   7.  Trouble concentrating 1   8.  Moving slowly or restless 0   Q9: Thoughts of better off dead/self-harm past 2 weeks 0   PHQ-9 Total Score 5         3/15/2024     1:51 PM   BLAKE-7    1. Feeling nervous, anxious, or on edge 1   2. Not being able to stop or control worrying 1   3. Worrying too much about different things 1   4. Trouble relaxing 2   5. Being so restless that it is hard to sit still 1   6. Becoming easily annoyed or irritable 1   7. Feeling afraid, as if something awful might happen 0   BLAKE-7 Total Score 7   If you checked any problems, how difficult have they made it for you to do your work, take care of things at home, or get along with other people? Not difficult at all         4/20/2023    11:30 AM 3/15/2024     2:06 PM   PEG Score   PEG Total Score 5.67 4.33        Chronic Pain Follow Up:    Location of pain: low back  Analgesia/pain control:    - Recent changes:  none    - Overall control: Tolerable with  discomfort    - Current treatments: Tramadol   Adherence:     - Do you ever take more pain medicine than prescribed? No    - When did you take your last dose of pain medicine?  Last night/evening   Adverse effects: No     Review of Systems  Constitutional, HEENT, cardiovascular, pulmonary, GI, , musculoskeletal, neuro, skin, endocrine and psych systems are negative, except as otherwise noted.        Objective    /86   Pulse 74   Temp 97.6  F (36.4  C) (Temporal)   Resp 16   Wt 72.6 kg (160 lb)   SpO2 99%   BMI 26.22 kg/m    Body mass index is 26.22 kg/m .  Physical Exam   GENERAL: alert and no distress  EYES: Eyes grossly normal to inspection, PERRL and conjunctivae and sclerae normal  RESP: lungs clear to auscultation - no rales, rhonchi or wheezes  CV: regular rate and rhythm, normal S1 S2, no S3 or S4, no murmur, click or rub, no peripheral edema  MS: no gross musculoskeletal defects noted, no edema  SKIN: no suspicious lesions or rashes  PSYCH: mentation appears normal, affect normal/bright    Results for orders placed or performed in visit on 03/15/24   Drug Confirmation Panel Urine with Creatinine     Status: None (In process)    Narrative    The following orders were created for panel order Drug Confirmation Panel Urine with Creatinine.  Procedure                               Abnormality         Status                     ---------                               -----------         ------                     Urine Drug Confirmation ...[[id number]]                      In process                 Urine Creatinine for Shree...[[id number]]                      In process                   Please view results for these tests on the individual orders.     Signed Electronically by: Vanita Saenz PA-C  Answers submitted by the patient for this visit:  Patient Health Questionnaire (Submitted on 3/15/2024)  If you checked off any problems, how difficult have these problems made it for you to do your work,  take care of things at home, or get along with other people?: Not difficult at all  PHQ9 TOTAL SCORE: 5  BLAKE-7 (Submitted on 3/15/2024)  BLAKE 7 TOTAL SCORE: 7  General Questionnaire (Submitted on 3/15/2024)  Chief Complaint: Chronic problems general questions HPI Form  What is the reason for your visit today? : medication update  How many servings of fruits and vegetables do you eat daily?: 4 or more  On average, how many sweetened beverages do you drink each day (Examples: soda, juice, sweet tea, etc.  Do NOT count diet or artificially sweetened beverages)?: 0  How many minutes a day do you exercise enough to make your heart beat faster?: 30 to 60  How many days a week do you exercise enough to make your heart beat faster?: 6  How many days per week do you miss taking your medication?: 0

## 2024-03-15 NOTE — LETTER
Opioid / Opioid Plus Controlled Substance Agreement    This is an agreement between you and your provider about the safe and appropriate use of controlled substance/opioids prescribed by your care team. Controlled substances are medicines that can cause physical and mental dependence (abuse).    There are strict laws about having and using these medicines. We here at [facility] are committing to working with you in your efforts to get better. To support you in this work, we ll help you schedule regular office appointments for medicine refills. If we must cancel or change your appointment for any reason, we ll make sure you have enough medicine to last until your next appointment.     As a Provider, I will:  Listen carefully to your concerns and treat you with respect.   Recommend a treatment plan that I believe is in your best interest. This plan may involve therapies other than opioid pain medication.   Talk with you often about the possible benefits, and the risk of harm of any medicine that we prescribe for you.   Provide a plan on how to taper (discontinue or go off) using this medicine if the decision is made to stop its use.    As a Patient, I understand that opioid(s):   Are a controlled substance prescribed by my care team to help me function or work and manage my condition(s).   Are strong medicines and can cause serious side effects such as:  Drowsiness, which can seriously affect my driving ability  A lower breathing rate, enough to cause death  Harm to my thinking ability   Depression   Abuse of and addiction to this medicine  Need to be taken exactly as prescribed. Combining opioids with certain medicines or chemicals (such as illegal drugs, sedatives, sleeping pills, and benzodiazepines) can be dangerous or even fatal. If I stop opioids suddenly, I may have severe withdrawal symptoms.  Do not work for all types of pain nor for all patients. If they re not helpful, I may be asked to stop  them.    The risks, benefits and side effects of these medicine(s) were explained to me. I agree that:  I will take part in other treatments as advised by my care team. This may be psychiatry or counseling, physical therapy, behavioral therapy, group treatment or a referral to a specialist.     I will keep all my appointments. I understand that this is part of the monitoring of opioids. My care team may require an office visit for EVERY opioid/controlled substance refill. If I miss appointments or don t follow instructions, my care team may stop my medicine.    I will take my medicines as prescribed. I will not change the dose or schedule unless my care team tells me to. There will be no refills if I run out early.     I may be asked to come to the clinic and complete a urine drug test or complete a pill count at any time. If I don t give a urine sample or participate in a pill count, the care team may stop my medicine.    I will only receive prescriptions from this clinic for chronic pain. If I am treated by another provider for acute pain issues, I will tell them that I am taking opioid pain medication for chronic pain and that I have a treatment agreement with this provider. I will inform my [facility] care team within one business day if I am given a prescription for any pain medication by another healthcare provider. My [facility] care team can contact other providers and pharmacists about my use of any medicines.    It is up to me to make sure that I don t run out of my medicines on weekends or holidays. If my care team is willing to refill my opioid prescription without a visit, I must request refills only during office hours. Refills may take up to 3 business days to process. I will use one pharmacy to fill all my opioid and other controlled substance prescriptions. I will notify the clinic about any changes to my insurance or medication availability.    I am responsible for my prescriptions.  If the medicine/prescription is lost, stolen or destroyed, it will not be replaced. I also agree not to share controlled substance medicines with anyone.    I am aware I should not use any illegal or recreational drugs. I agree not to drink alcohol unless my care team says I can.       If I enroll in the [place] [facility] program, I will tell my care team prior to my next refill.     I will tell my care team right away if I become pregnant, have a new medical problem treated outside of my regular clinic, or have a change in my medications.    I understand that this medicine can affect my thinking, judgment and reaction time. Alcohol and drugs affect the brain and body, which can affect the safety of my driving. Being under the influence of alcohol or drugs can affect my decision-making, behaviors, personal safety, and the safety of others. Driving while impaired (DWI) can occur if a person is driving, operating, or in physical control of a car, motorcycle, boat, snowmobile, ATV, motorbike, off-road vehicle, or any other motor vehicle ([place] Statute 169A.20). I understand the risk if I choose to drive or operate any vehicle or machinery.    I understand that if I do not follow any of the conditions above, my prescriptions or treatment may be stopped or changed.          Opioids  What You Need to Know    What are opioids?   Opioids are pain medicines that must be prescribed by a doctor. They are also known as narcotics.     Examples are:   morphine (MS Contin, Deidra)  oxycodone (Oxycontin)  oxycodone and acetaminophen (Percocet)  hydrocodone and acetaminophen (Vicodin, Norco)   fentanyl patch (Duragesic)   hydromorphone (Dilaudid)   methadone  codeine (Tylenol #3)     What do opioids do well?   Opioids are best for severe short-term pain such as after a surgery or injury. They may work well for cancer pain. They may help some people with long-lasting (chronic) pain.     What do opioids NOT do well?   Opioids  never get rid of pain entirely, and they don t work well for most patients with chronic pain. Opioids don t reduce swelling, one of the causes of pain.                                    Other ways to manage chronic pain and improve function include:     Treat the health problem that may be causing pain  Anti-inflammation medicines, which reduce swelling and tenderness, such as ibuprofen (Advil, Motrin) or naproxen (Aleve)  Acetaminophen (Tylenol)  Antidepressants and anti-seizure medicines, especially for nerve pain  Topical treatments such as patches or creams  Injections or nerve blocks  Chiropractic or osteopathic treatment  Acupuncture, massage, deep breathing, meditation, visual imagery, aromatherapy  Use heat or ice at the pain site  Physical therapy   Exercise  Stop smoking  Take part in therapy       Risks and side effects     Talk to your doctor before you start or decide to keep taking opioids. Possible side effects include:    Lowering your breathing rate enough to cause death  Overdose, including death, especially if taking higher than prescribed doses  Worse depression symptoms; less pleasure in things you usually enjoy  Feeling tired or sluggish  Slower thoughts or cloudy thinking  Being more sensitive to pain over time; pain is harder to control  Trouble sleeping or restless sleep  Changes in hormone levels (for example, less testosterone)  Changes in sex drive or ability to have sex  Constipation  Unsafe driving  Itching and sweating  Dizziness  Nausea, throwing up and dry mouth    What else should I know about opioids?    Opioids may lead to dependence, tolerance, or addiction.    Dependence means that if you stop or reduce the medicine too quickly, you will have withdrawal symptoms. These include loose poop (diarrhea), jitters, flu-like symptoms, nervousness and tremors. Dependence is not the same as addiction.                     Tolerance means needing higher doses over time to get the same  effect. This may increase the chance of serious side effects.    Addiction is when people improperly use a substance that harms their body, their mind or their relations with others. Use of opiates can cause a relapse of addiction if you have a history of drug or alcohol abuse.    People who have used opioids for a long time may have a lower quality of life, worse depression, higher levels of pain and more visits to doctors.    You can overdose on opioids. Take these steps to lower your risk of overdose:    Recognize the signs:  Signs of overdose include decrease or loss of consciousness (blackout), slowed breathing, trouble waking up and blue lips. If someone is worried about overdose, they should call 911.    Talk to your doctor about Narcan (naloxone).   If you are at risk for overdose, you may be given a prescription for Narcan. This medicine very quickly reverses the effects of opioids.   If you overdose, a friend or family member can give you Narcan while waiting for the ambulance. They need to know the signs of overdose and how to give Narcan.     Don't use alcohol or street drugs.   Taking them with opioids can cause death.    Do not take any of these medicines unless your doctor says it s OK. Taking these with opioids can cause death:  Benzodiazepines, such as lorazepam (Ativan), alprazolam (Xanax) or diazepam (Valium)  Muscle relaxers, such as cyclobenzaprine (Flexeril)  Sleeping pills like zolpidem (Ambien)   Other opioids      How to keep you and other people safe while taking opioids:    Never share your opioids with others.  Opioid medicines are regulated by the Drug Enforcement Agency (JOSH). Selling or sharing medications is a criminal act.    2. Be sure to store opioids in a secure place, locked up if possible. Young children can easily swallow them and overdose.    3. When you are traveling with your medicines, keep them in the original bottles. If you use a pill box, be sure you also carry a copy  of your medicine list from your clinic or pharmacy.    4. Safe disposal of opioids    Most pharmacies have places to get rid of medicine, called disposal kiosks. Medicine disposal options are also available in every [facility]. Search your county and  medication disposal  to find more options. You can find more details at:  [email or web address]://[email or web address]-green/[email or web address]-[email or web address]-medications     I agree that my provider, clinic care team, and pharmacy may work with any city, state or federal law enforcement agency that investigates the misuse, sale, or other diversion of my controlled medicine. I will allow my provider to discuss my care with, or share a copy of, this agreement with any other treating provider, pharmacy or emergency room where I receive care.    I have read this agreement and have asked questions about anything I did not understand.    _______________________________________________________  Patient Signature - Km Freedman _____________________                   Date     _______________________________________________________  Provider Signature - Vanita Saenz PA-C   _____________________                   Date     _______________________________________________________  Witness Signature (required if provider not present while patient signing)   _____________________                   Date

## 2024-03-15 NOTE — NURSING NOTE
"Chief Complaint   Patient presents with    Recheck Medication     Tramadol       Initial Pulse 74   Temp 97.6  F (36.4  C) (Temporal)   Resp 12   Wt 72.6 kg (160 lb)   SpO2 99%   BMI 26.22 kg/m   Estimated body mass index is 26.22 kg/m  as calculated from the following:    Height as of 9/1/23: 1.664 m (5' 5.5\").    Weight as of this encounter: 72.6 kg (160 lb).  Medication Review: complete    The next two questions are to help us understand your food security.  If you are feeling you need any assistance in this area, we have resources available to support you today.          3/15/2024   SDOH- Food Insecurity   Within the past 12 months, did you worry that your food would run out before you got money to buy more? N   Within the past 12 months, did the food you bought just not last and you didn t have money to get more? N         Health Care Directive:  Patient does not have a Health Care Directive or Living Will: Discussed advance care planning with patient; information given to patient to review.    Radha Early, [facility]      "

## 2024-03-19 LAB
N-NORTRAMADOL/CREAT UR CFM: 4231 NG/MG {CREAT}
O-NORTRAMADOL UR CFM-MCNC: 4400 NG/ML
TRAMADOL CTO UR CFM-MCNC: ABNORMAL NG/ML
TRAMADOL/CREAT UR: ABNORMAL

## 2024-08-09 ENCOUNTER — LAB (OUTPATIENT)
Dept: LAB | Facility: OTHER | Age: 69
End: 2024-08-09
Attending: NURSE PRACTITIONER
Payer: MEDICARE

## 2024-08-09 DIAGNOSIS — E29.1 HYPOGONADISM MALE: ICD-10-CM

## 2024-08-09 DIAGNOSIS — R39.12 WEAK URINARY STREAM: ICD-10-CM

## 2024-08-09 DIAGNOSIS — E34.9 TESTOSTERONE DEFICIENCY: ICD-10-CM

## 2024-08-09 DIAGNOSIS — I10 ESSENTIAL HYPERTENSION: ICD-10-CM

## 2024-08-09 DIAGNOSIS — E29.1 MALE HYPOGONADISM: ICD-10-CM

## 2024-08-09 LAB
ESTRADIOL SERPL-MCNC: 13 PG/ML
HCT VFR BLD AUTO: 47.4 % (ref 40–53)
HGB BLD-MCNC: 14.8 G/DL (ref 13.3–17.7)
PSA SERPL DL<=0.01 NG/ML-MCNC: 2.92 NG/ML (ref 0–6.5)

## 2024-08-09 PROCEDURE — 82670 ASSAY OF TOTAL ESTRADIOL: CPT | Mod: ZL

## 2024-08-09 PROCEDURE — 84153 ASSAY OF PSA TOTAL: CPT | Mod: ZL

## 2024-08-09 PROCEDURE — 84270 ASSAY OF SEX HORMONE GLOBUL: CPT | Mod: ZL

## 2024-08-09 PROCEDURE — 85014 HEMATOCRIT: CPT | Mod: ZL

## 2024-08-09 PROCEDURE — 84403 ASSAY OF TOTAL TESTOSTERONE: CPT | Mod: ZL

## 2024-08-09 PROCEDURE — 36415 COLL VENOUS BLD VENIPUNCTURE: CPT | Mod: ZL

## 2024-08-09 PROCEDURE — 85018 HEMOGLOBIN: CPT | Mod: ZL

## 2024-08-10 LAB — SHBG SERPL-SCNC: 34 NMOL/L (ref 11–80)

## 2024-08-13 LAB
TESTOST FREE SERPL-MCNC: 10.87 NG/DL
TESTOST SERPL-MCNC: 523 NG/DL (ref 240–950)

## 2024-08-14 ASSESSMENT — ANXIETY QUESTIONNAIRES
1. FEELING NERVOUS, ANXIOUS, OR ON EDGE: SEVERAL DAYS
5. BEING SO RESTLESS THAT IT IS HARD TO SIT STILL: NOT AT ALL
8. IF YOU CHECKED OFF ANY PROBLEMS, HOW DIFFICULT HAVE THESE MADE IT FOR YOU TO DO YOUR WORK, TAKE CARE OF THINGS AT HOME, OR GET ALONG WITH OTHER PEOPLE?: SOMEWHAT DIFFICULT
2. NOT BEING ABLE TO STOP OR CONTROL WORRYING: SEVERAL DAYS
7. FEELING AFRAID AS IF SOMETHING AWFUL MIGHT HAPPEN: NOT AT ALL
GAD7 TOTAL SCORE: 5
7. FEELING AFRAID AS IF SOMETHING AWFUL MIGHT HAPPEN: NOT AT ALL
GAD7 TOTAL SCORE: 5
3. WORRYING TOO MUCH ABOUT DIFFERENT THINGS: SEVERAL DAYS
GAD7 TOTAL SCORE: 5
IF YOU CHECKED OFF ANY PROBLEMS ON THIS QUESTIONNAIRE, HOW DIFFICULT HAVE THESE PROBLEMS MADE IT FOR YOU TO DO YOUR WORK, TAKE CARE OF THINGS AT HOME, OR GET ALONG WITH OTHER PEOPLE: SOMEWHAT DIFFICULT
4. TROUBLE RELAXING: SEVERAL DAYS
6. BECOMING EASILY ANNOYED OR IRRITABLE: SEVERAL DAYS

## 2024-08-14 ASSESSMENT — PATIENT HEALTH QUESTIONNAIRE - PHQ9
SUM OF ALL RESPONSES TO PHQ QUESTIONS 1-9: 6
SUM OF ALL RESPONSES TO PHQ QUESTIONS 1-9: 6
10. IF YOU CHECKED OFF ANY PROBLEMS, HOW DIFFICULT HAVE THESE PROBLEMS MADE IT FOR YOU TO DO YOUR WORK, TAKE CARE OF THINGS AT HOME, OR GET ALONG WITH OTHER PEOPLE: SOMEWHAT DIFFICULT

## 2024-08-16 ENCOUNTER — OFFICE VISIT (OUTPATIENT)
Dept: FAMILY MEDICINE | Facility: OTHER | Age: 69
End: 2024-08-16
Attending: PHYSICIAN ASSISTANT
Payer: COMMERCIAL

## 2024-08-16 VITALS
RESPIRATION RATE: 16 BRPM | WEIGHT: 155 LBS | TEMPERATURE: 96.9 F | DIASTOLIC BLOOD PRESSURE: 76 MMHG | BODY MASS INDEX: 25.4 KG/M2 | HEART RATE: 120 BPM | SYSTOLIC BLOOD PRESSURE: 126 MMHG

## 2024-08-16 DIAGNOSIS — R53.82 CHRONIC FATIGUE: ICD-10-CM

## 2024-08-16 DIAGNOSIS — E55.9 VITAMIN D DEFICIENCY, UNSPECIFIED: ICD-10-CM

## 2024-08-16 DIAGNOSIS — M96.1 FAILED BACK SYNDROME OF LUMBAR SPINE: ICD-10-CM

## 2024-08-16 DIAGNOSIS — F33.0 DEPRESSION, MAJOR, RECURRENT, MILD (H): ICD-10-CM

## 2024-08-16 DIAGNOSIS — I10 ESSENTIAL HYPERTENSION: ICD-10-CM

## 2024-08-16 DIAGNOSIS — Z13.220 LIPID SCREENING: ICD-10-CM

## 2024-08-16 DIAGNOSIS — M51.379 DEGENERATION OF LUMBAR OR LUMBOSACRAL INTERVERTEBRAL DISC: Primary | ICD-10-CM

## 2024-08-16 LAB
CHOLEST SERPL-MCNC: 255 MG/DL
FASTING STATUS PATIENT QL REPORTED: NO
HDLC SERPL-MCNC: 45 MG/DL
LDLC SERPL CALC-MCNC: 190 MG/DL
NONHDLC SERPL-MCNC: 210 MG/DL
TRIGL SERPL-MCNC: 102 MG/DL
TSH SERPL DL<=0.005 MIU/L-ACNC: 2.88 UIU/ML (ref 0.3–4.2)
VIT B12 SERPL-MCNC: 454 PG/ML (ref 232–1245)
VIT D+METAB SERPL-MCNC: 29 NG/ML (ref 20–50)

## 2024-08-16 PROCEDURE — 82607 VITAMIN B-12: CPT | Mod: ZL | Performed by: PHYSICIAN ASSISTANT

## 2024-08-16 PROCEDURE — 82306 VITAMIN D 25 HYDROXY: CPT | Mod: ZL | Performed by: PHYSICIAN ASSISTANT

## 2024-08-16 PROCEDURE — 84443 ASSAY THYROID STIM HORMONE: CPT | Mod: ZL | Performed by: PHYSICIAN ASSISTANT

## 2024-08-16 PROCEDURE — G0463 HOSPITAL OUTPT CLINIC VISIT: HCPCS

## 2024-08-16 PROCEDURE — 36415 COLL VENOUS BLD VENIPUNCTURE: CPT | Mod: ZL | Performed by: PHYSICIAN ASSISTANT

## 2024-08-16 PROCEDURE — 83718 ASSAY OF LIPOPROTEIN: CPT | Mod: ZL | Performed by: PHYSICIAN ASSISTANT

## 2024-08-16 PROCEDURE — 99214 OFFICE O/P EST MOD 30 MIN: CPT | Performed by: PHYSICIAN ASSISTANT

## 2024-08-16 RX ORDER — TRAMADOL HYDROCHLORIDE 50 MG/1
50 TABLET ORAL EVERY 6 HOURS PRN
Qty: 60 TABLET | Refills: 5 | Status: SHIPPED | OUTPATIENT
Start: 2024-08-16

## 2024-08-16 RX ORDER — LISINOPRIL/HYDROCHLOROTHIAZIDE 10-12.5 MG
1 TABLET ORAL DAILY
Qty: 90 TABLET | Refills: 4 | Status: SHIPPED | OUTPATIENT
Start: 2024-08-16

## 2024-08-16 RX ORDER — SERTRALINE HYDROCHLORIDE 100 MG/1
100 TABLET, FILM COATED ORAL DAILY
Qty: 90 TABLET | Refills: 4 | Status: SHIPPED | OUTPATIENT
Start: 2024-08-16

## 2024-08-16 ASSESSMENT — PAIN SCALES - GENERAL: PAINLEVEL: MILD PAIN (3)

## 2024-08-16 NOTE — PROGRESS NOTES
Assessment & Plan       ICD-10-CM    1. Degeneration of lumbar or lumbosacral intervertebral disc  M51.37 traMADol (ULTRAM) 50 MG tablet      2. Failed back syndrome of lumbar spine  M96.1 traMADol (ULTRAM) 50 MG tablet      3. Depression, major, recurrent, mild (H24)  F33.0 sertraline (ZOLOFT) 100 MG tablet      4. Essential hypertension  I10 lisinopril-hydrochlorothiazide (ZESTORETIC) 10-12.5 MG tablet      5. Chronic fatigue  R53.82 Vitamin D Total     Vitamin B12     TSH Reflex GH      6. Lipid screening  Z13.220 Lipid Panel      7. Vitamin D deficiency, unspecified  E55.9 Vitamin D Total        Degenerative disc disease of lumbar and lumbosacral spine, this is chronic in nature.  Continues to wean his way down on opiate/narcotic therapy.  Stable on tramadol number 60/month.  Continues with multimodal approach to pain including topical therapies, gentle stretching and home exercise plan.  No red flag symptoms today.  PDMP reviewed and stable.  Tramadol #60 with 5 refills was sent through to pharmacy.  CSA and U tox are both up-to-date, last completed in March 2024.  Return precautions reviewed.  Failed back syndrome of lumbar spine, see #1.  Current treatment plan is appropriate.  Mild major depression, stable.  Continue sertraline 100 mg daily.  Reviewed stress management and coping techniques.  Return precautions reviewed.  Refilled sertraline.  Essential hypertension, stable.  Blood pressure 126/76 today, continues to remain at goal at home.  Stable renal and hepatic functions today.  Refill Zestoretic 10-12.5 mg daily.  Ongoing close follow-up.  Chronic fatigue, progressive in nature, no red flag symptoms.  B12 on the lower end of normal at 454, recommend to start vitamin D 1000 mcg daily (over-the-counter dosing) and recheck in 6 months.  TSH normal 2.88.  Continue levothyroxine at current dose.  Vitamin D level in process, anticipate this will take 1 to 3 days to return.  Will update on results and  recommendations as available.  Lipid screening, nonfasting.  Cholesterol 255, triglycerides 102, HDL 45 and .  ASCVD score of 25.1%. Recommend increased lean proteins, fruits and vegetable intake. Goal exercise 150 minutes of moderate exercise per week (walking is great exercise). Recommend statin, will await Solo's response prior to prescription.   Remote history of low vitamin D, years ago.  Update vitamin D level today.  This will return in 1 to 3 days, see #5.    The 10-year ASCVD risk score (Maged JASSO, et al., 2019) is: 25.1%    Values used to calculate the score:      Age: 69 years      Sex: Male      Is Non- : No      Diabetic: No      Tobacco smoker: No      Systolic Blood Pressure: 126 mmHg      Is BP treated: Yes      HDL Cholesterol: 45 mg/dL      Total Cholesterol: 255 mg/dL    See Patient Instructions    Return in about 6 months (around 2/16/2025) for Med Refills - Chronic Pain.    Subjective   Solo is a 69 year old, presenting for the following health issues:  Recheck Medication        8/16/2024     9:51 AM   Additional Questions   Roomed by deandre begum lpn     History of Present Illness       Reason for visit:  Prescription refill    He eats 2-3 servings of fruits and vegetables daily.He consumes 0 sweetened beverage(s) daily.He exercises with enough effort to increase his heart rate 9 or less minutes per day.  He exercises with enough effort to increase his heart rate 3 or less days per week.   He is taking medications regularly.     Solo presents to the clinic for chronic medication follow up. I saw him last on 3/15/24 and refilled Tramadol at current dosing along with refill of Wellbutrin 150 mg daily for depression and Levothyroxine 50 mcg daily for hypothyroidism.     We updated his CSA and UTOX at that visit as well. He is currently on Tramadol 50 mg QID for degenerative/osteoarthritis changes of the lumbar spine as well as failed back syndrome.     Chronic fatigue -  ongoing for a while now, wondering about steps to improve this.     Zoloft 100 mg daily is also due for refill along with Zestoretic.     He follows externally with urology at [facility] for Aveed injections for hypogonadism    Hyperlipidemia Follow-Up    Are you regularly taking any medication or supplement to lower your cholesterol?   No  Are you having muscle aches or other side effects that you think could be caused by your cholesterol lowering medication?  N/A  Working on healthy diet - ongoing    Hypertension Follow-up    Do you check your blood pressure regularly outside of the clinic? No   Are you following a low salt diet? Yes  Are your blood pressures ever more than 140 on the top number (systolic) OR more   than 90 on the bottom number (diastolic), for example 140/90? No    Hypothyroidism Follow-up    Since last visit, patient describes the following symptoms: Weight stable, no hair loss, no skin changes, no constipation, no loose stools    Depression and Anxiety   How are you doing with your depression since your last visit? No change  How are you doing with your anxiety since your last visit?  No change  Are you having other symptoms that might be associated with depression or anxiety? No  Have you had a significant life event? No   Do you have any concerns with your use of alcohol or other drugs? No    Social History     Tobacco Use    Smoking status: Never     Passive exposure: Never    Smokeless tobacco: Never   Vaping Use    Vaping status: Never Used   Substance Use Topics    Alcohol use: No    Drug use: Never         9/1/2023    10:13 AM 3/15/2024     1:50 PM 8/14/2024     6:23 PM   PHQ   PHQ-9 Total Score 2 5 6   Q9: Thoughts of better off dead/self-harm past 2 weeks Not at all Not at all Not at all         8/30/2023     3:16 PM 3/15/2024     1:51 PM 8/14/2024     6:24 PM   BLAKE-7 SCORE   Total Score 0 (minimal anxiety) 7 (mild anxiety) 5 (mild anxiety)   Total Score 0 7 5     Review of  Systems  Constitutional, HEENT, cardiovascular, pulmonary, GI, , musculoskeletal, neuro, skin, endocrine and psych systems are negative, except as otherwise noted.        Objective    /76 (BP Location: Right arm, Patient Position: Sitting, Cuff Size: Adult Regular)   Pulse 120   Temp 96.9  F (36.1  C) (Tympanic)   Resp 16   Wt 70.3 kg (155 lb)   BMI 25.40 kg/m    Body mass index is 25.4 kg/m .  Physical Exam   GENERAL: alert and no distress  EYES: Eyes grossly normal to inspection  RESP: lungs clear to auscultation - no rales, rhonchi or wheezes  CV: regular rate and rhythm, normal S1 S2, no S3 or S4, no murmur, click or rub, no peripheral edema  MS: no gross musculoskeletal defects noted, no edema  SKIN: no suspicious lesions or rashes  PSYCH: mentation appears normal, affect normal/bright  LYMPH: normal ant/post cervical, supraclavicular nodes    Results for orders placed or performed in visit on 08/16/24   Vitamin B12     Status: Normal   Result Value Ref Range    Vitamin B12 454 232 - 1,245 pg/mL   TSH Reflex GH     Status: Normal   Result Value Ref Range    TSH 2.88 0.30 - 4.20 uIU/mL   Lipid Panel     Status: Abnormal   Result Value Ref Range    Cholesterol 255 (H) <200 mg/dL    Triglycerides 102 <150 mg/dL    Direct Measure HDL 45 >=40 mg/dL    LDL Cholesterol Calculated 190 (H) <=100 mg/dL    Non HDL Cholesterol 210 (H) <130 mg/dL    Patient Fasting > 8hrs? No     Narrative    Cholesterol  Desirable:  <200 mg/dL    Triglycerides  Normal:  Less than 150 mg/dL  Borderline High:  150-199 mg/dL  High:  200-499 mg/dL  Very High:  Greater than or equal to 500 mg/dL    Direct Measure HDL  Female:  Greater than or equal to 50 mg/dL   Male:  Greater than or equal to 40 mg/dL    LDL Cholesterol  Desirable:  <100mg/dL  Above Desirable:  100-129 mg/dL   Borderline High:  130-159 mg/dL   High:  160-189 mg/dL   Very High:  >= 190 mg/dL    Non HDL Cholesterol  Desirable:  130 mg/dL  Above Desirable:  130-159  mg/dL  Borderline High:  160-189 mg/dL  High:  190-219 mg/dL  Very High:  Greater than or equal to 220 mg/dL       Signed Electronically by: Vanita Ovalle PA-C

## 2024-08-16 NOTE — NURSING NOTE
"Patient presents to the clinic for medication management.    FOOD SECURITY SCREENING QUESTIONS:    The next two questions are to help us understand your food security.  If you are feeling you need any assistance in this area, we have resources available to support you today.    Hunger Vital Signs:  Within the past 12 months we worried whether our food would run out before we got money to buy more. Never  Within the past 12 months the food we bought just didn't last and we didn't have money to get more. Never    Advance Care Directive on file? no  Advance Care Directive provided to patient? yes      Chief Complaint   Patient presents with    Recheck Medication       Initial /76 (BP Location: Right arm, Patient Position: Sitting, Cuff Size: Adult Regular)   Pulse 120   Temp 96.9  F (36.1  C) (Tympanic)   Resp 16   Wt 70.3 kg (155 lb)   BMI 25.40 kg/m   Estimated body mass index is 25.4 kg/m  as calculated from the following:    Height as of 9/1/23: 1.664 m (5' 5.5\").    Weight as of this encounter: 70.3 kg (155 lb).  Medication Reconciliation: complete        Charline Quarels LPN     "

## 2025-02-19 DIAGNOSIS — M51.379 DEGENERATION OF LUMBAR OR LUMBOSACRAL INTERVERTEBRAL DISC: ICD-10-CM

## 2025-02-19 DIAGNOSIS — M96.1 FAILED BACK SYNDROME OF LUMBAR SPINE: ICD-10-CM

## 2025-02-21 RX ORDER — TRAMADOL HYDROCHLORIDE 50 MG/1
50 TABLET ORAL EVERY 6 HOURS PRN
Qty: 60 TABLET | Refills: 5 | OUTPATIENT
Start: 2025-02-21

## 2025-02-21 NOTE — TELEPHONE ENCOUNTER
traMADol (ULTRAM) 50 MG tablet         Last Written Prescription Date:  8/16/24  Last Fill Quantity: 60,   # refills: 5  Last Office Visit: 8/16/24  Future Office visit:       Routing refill request to provider for review/approval because:  Drug not on the [facility], P or [facility] refill protocol or controlled substance.  Will forward to provider for consideration.  Unable to complete prescription refill per RNMedication Refill Policy.................... Gabby Ruiz RN ....................  2/21/2025   6:39 AM

## 2025-02-24 NOTE — TELEPHONE ENCOUNTER
Called and spoke to Patient after verifying last name and date of birth. Pt transferred to scheduling line to set up appointment, and this is ok for Dr. Flor to address when he returns Wednesday morning:    Future Appointments 2/24/2025 - 8/23/2025        Date Visit Type Length Department Provider     3/4/2025 10:30 AM OFFICE VISIT 30 min  FAMILY PRACTICE Vanita Ovalle PA-C    Location Instructions:     [facility] [address]                   Pending, to bridge Pt to appointment.    Unable to complete prescription refill per RN Medication Refill Policy. Andra Morataya RN .............. 2/24/2025  4:36 PM

## 2025-02-24 NOTE — TELEPHONE ENCOUNTER
Spouse called to check on the status of this refill. Patient will be out in 2 days. Wants sent to [facility] on 2/24/2025 at 4:23 PM

## 2025-02-24 NOTE — TELEPHONE ENCOUNTER
February 21, 2025  Andres Flor MD to  Unit 3 Nurse   TP      2/21/25  9:29 AM  Call him, needs a follow up on this every 6 months. See when he can get in and I can bridge him.

## 2025-02-25 RX ORDER — TRAMADOL HYDROCHLORIDE 50 MG/1
50 TABLET ORAL EVERY 6 HOURS PRN
Qty: 60 TABLET | Refills: 0 | Status: SHIPPED | OUTPATIENT
Start: 2025-02-25

## 2025-03-02 ASSESSMENT — ANXIETY QUESTIONNAIRES
7. FEELING AFRAID AS IF SOMETHING AWFUL MIGHT HAPPEN: NOT AT ALL
6. BECOMING EASILY ANNOYED OR IRRITABLE: NOT AT ALL
IF YOU CHECKED OFF ANY PROBLEMS ON THIS QUESTIONNAIRE, HOW DIFFICULT HAVE THESE PROBLEMS MADE IT FOR YOU TO DO YOUR WORK, TAKE CARE OF THINGS AT HOME, OR GET ALONG WITH OTHER PEOPLE: NOT DIFFICULT AT ALL
4. TROUBLE RELAXING: SEVERAL DAYS
8. IF YOU CHECKED OFF ANY PROBLEMS, HOW DIFFICULT HAVE THESE MADE IT FOR YOU TO DO YOUR WORK, TAKE CARE OF THINGS AT HOME, OR GET ALONG WITH OTHER PEOPLE?: NOT DIFFICULT AT ALL
GAD7 TOTAL SCORE: 2
5. BEING SO RESTLESS THAT IT IS HARD TO SIT STILL: SEVERAL DAYS
GAD7 TOTAL SCORE: 2
1. FEELING NERVOUS, ANXIOUS, OR ON EDGE: NOT AT ALL
3. WORRYING TOO MUCH ABOUT DIFFERENT THINGS: NOT AT ALL
2. NOT BEING ABLE TO STOP OR CONTROL WORRYING: NOT AT ALL
GAD7 TOTAL SCORE: 2
7. FEELING AFRAID AS IF SOMETHING AWFUL MIGHT HAPPEN: NOT AT ALL

## 2025-03-04 ENCOUNTER — OFFICE VISIT (OUTPATIENT)
Dept: FAMILY MEDICINE | Facility: OTHER | Age: 70
End: 2025-03-04
Attending: PHYSICIAN ASSISTANT
Payer: MEDICARE

## 2025-03-04 VITALS
TEMPERATURE: 96.5 F | HEART RATE: 76 BPM | BODY MASS INDEX: 25.56 KG/M2 | WEIGHT: 156 LBS | RESPIRATION RATE: 20 BRPM | SYSTOLIC BLOOD PRESSURE: 130 MMHG | DIASTOLIC BLOOD PRESSURE: 78 MMHG

## 2025-03-04 DIAGNOSIS — M51.372 DEGENERATION OF INTERVERTEBRAL DISC OF LUMBOSACRAL REGION WITH DISCOGENIC BACK PAIN AND LOWER EXTREMITY PAIN: ICD-10-CM

## 2025-03-04 DIAGNOSIS — E03.8 OTHER SPECIFIED HYPOTHYROIDISM: ICD-10-CM

## 2025-03-04 DIAGNOSIS — F33.0 DEPRESSION, MAJOR, RECURRENT, MILD: Primary | ICD-10-CM

## 2025-03-04 DIAGNOSIS — E53.8 VITAMIN B12 DEFICIENCY (NON ANEMIC): ICD-10-CM

## 2025-03-04 DIAGNOSIS — M96.1 FAILED BACK SYNDROME OF LUMBAR SPINE: ICD-10-CM

## 2025-03-04 LAB
CREAT UR-MCNC: 83 MG/DL
TSH SERPL DL<=0.005 MIU/L-ACNC: 3.46 UIU/ML (ref 0.3–4.2)
VIT B12 SERPL-MCNC: 869 PG/ML (ref 232–1245)

## 2025-03-04 PROCEDURE — 80355 GABAPENTIN NON-BLOOD: CPT | Mod: ZL | Performed by: PHYSICIAN ASSISTANT

## 2025-03-04 PROCEDURE — 80353 DRUG SCREENING COCAINE: CPT | Mod: ZL | Performed by: PHYSICIAN ASSISTANT

## 2025-03-04 PROCEDURE — 84443 ASSAY THYROID STIM HORMONE: CPT | Mod: ZL | Performed by: PHYSICIAN ASSISTANT

## 2025-03-04 PROCEDURE — 82607 VITAMIN B-12: CPT | Mod: ZL | Performed by: PHYSICIAN ASSISTANT

## 2025-03-04 PROCEDURE — G0463 HOSPITAL OUTPT CLINIC VISIT: HCPCS

## 2025-03-04 PROCEDURE — 36415 COLL VENOUS BLD VENIPUNCTURE: CPT | Mod: ZL | Performed by: PHYSICIAN ASSISTANT

## 2025-03-04 RX ORDER — LEVOTHYROXINE SODIUM 50 UG/1
50 TABLET ORAL DAILY
Qty: 90 TABLET | Refills: 4 | Status: SHIPPED | OUTPATIENT
Start: 2025-03-04

## 2025-03-04 RX ORDER — BUPROPION HYDROCHLORIDE 150 MG/1
150 TABLET ORAL EVERY MORNING
Qty: 90 TABLET | Refills: 4 | Status: SHIPPED | OUTPATIENT
Start: 2025-03-04

## 2025-03-04 RX ORDER — TRAMADOL HYDROCHLORIDE 50 MG/1
50 TABLET ORAL EVERY 6 HOURS PRN
Qty: 60 TABLET | Refills: 5 | Status: SHIPPED | OUTPATIENT
Start: 2025-03-04

## 2025-03-04 ASSESSMENT — PATIENT HEALTH QUESTIONNAIRE - PHQ9
SUM OF ALL RESPONSES TO PHQ QUESTIONS 1-9: 4
SUM OF ALL RESPONSES TO PHQ QUESTIONS 1-9: 4

## 2025-03-04 ASSESSMENT — PAIN SCALES - GENERAL: PAINLEVEL_OUTOF10: MILD PAIN (3)

## 2025-03-04 NOTE — NURSING NOTE
"Patient presents to the clinic for medication management.    FOOD SECURITY SCREENING QUESTIONS:    The next two questions are to help us understand your food security.  If you are feeling you need any assistance in this area, we have resources available to support you today.    Hunger Vital Signs:  Within the past 12 months we worried whether our food would run out before we got money to buy more. Never  Within the past 12 months the food we bought just didn't last and we didn't have money to get more. Never    Advance Care Directive on file? no  Advance Care Directive provided to patient? Yes.      Chief Complaint   Patient presents with    Recheck Medication       Initial /78 (BP Location: Right arm, Patient Position: Sitting, Cuff Size: Adult Regular)   Pulse 76   Temp (!) 96.5  F (35.8  C) (Tympanic)   Resp 20   Wt 70.8 kg (156 lb)   BMI 25.56 kg/m   Estimated body mass index is 25.56 kg/m  as calculated from the following:    Height as of 9/1/23: 1.664 m (5' 5.5\").    Weight as of this encounter: 70.8 kg (156 lb).  Medication Reconciliation: complete        Charline Quarles LPN     "

## 2025-03-04 NOTE — PROGRESS NOTES
Assessment & Plan       ICD-10-CM    1. Depression, major, recurrent, mild  F33.0 buPROPion (WELLBUTRIN XL) 150 MG 24 hr tablet      2. Vitamin B12 deficiency (non anemic)  E53.8 Vitamin B12      3. Other specified hypothyroidism  E03.8 levothyroxine (SYNTHROID/LEVOTHROID) 50 MCG tablet     TSH Reflex GH      4. Degeneration of intervertebral disc of lumbosacral region with discogenic back pain and lower extremity pain  M51.372 Drug Confirmation Panel Urine with Creatinine     traMADol (ULTRAM) 50 MG tablet      5. Failed back syndrome of lumbar spine  M96.1 Drug Confirmation Panel Urine with Creatinine     traMADol (ULTRAM) 50 MG tablet        Mild major depression - stable. Continue Wellbutrin. Supportive relationships with family and friends. Good stress management and coping techniques. Aware of 211 and crisis line information. Refill Wellbutrin 150 mg daily.   B12 deficiency - improved. B12 level improved to 869 (previously 454 in August 2024). Continue current B12 supplementation.   Hypothyroidism - stable. TSH returned at 3.46. Please take your Levothyroxine (Synthroid) first thing in the morning on an empty stomach - this helps with absorption. Wait 60 minutes prior to eating, drinking and/or taking other medications. It is important to take Levothyroxine (Synthroid) at the same time everyday.   Degenerative changes of lumbosacral spine - stable on current regimen of Tramadol. Updated CSA and UTOX today. See #5.   Failed back syndrome of lumbar spine, continue with multimodal approach to pain management. Aware Tramadol refills require an in-person visit every 6-months.     Maintenance of person-centered care plan - goals, personal strengths, clinical needs, desired outcomes: Ongoing.     How is patient functioning/improving/not improving with the current pain protocol / RX:   - What is patient unable to do or has difficulty completing because of pain: repetitive lifting, outside chores in frigid weather  -  How has the current pain protocol / RX helped: allowed to complete ADLs - such as snowblowing/snow removal in winter, working on garage products, etc.   - Adequate pain management helps to improve quality of life and performing ADLs independently.   - Encouraged regular stretching, walking, exercise. Healthy meals and diet.   - Facilitation and coordination of any necessary behavorial health treatment: Ongoing.   - Encouraged counseling and behavorial health management to help with chronic pain, and if any variable anxiety / depression symptoms develop.   - Communication and care coordination between relevant practitioners furnishing care (PT/OT, complementary and integrative approaches, community-based care): Ongoing.     PDMP Review         Value Time User    State PDMP site checked  Yes 3/4/2025 10:21 AM Vanita Ovalle PA-C          Last CSA Agreement:   CSA -- Patient Level:     [Media Unavailable] Controlled Substance Agreement - Opioid - Scan on 3/4/2025 10:41 AM   [Media Unavailable] Controlled Substance Agreement - Opioid - Scan on 3/15/2024  3:07 PM   [Media Unavailable] Controlled Substance Agreement - Opioid - Scan on 10/21/2022 11:45 AM   [Media Unavailable] Controlled Substance Agreement - Opioid - Scan on 6/29/2021  1:13 PM       Last UDS: 3/4/2025    See Patient Instructions    Return in about 6 months (around 9/4/2025) for Med Refills - Chronic Pain.    Esau Banda is a 69 year old, presenting for the following health issues:  Recheck Medication        3/4/2025    10:07 AM   Additional Questions   Roomed by deandre begum lpn     History of Present Illness       Reason for visit:  Follow up for medication    He eats 2-3 servings of fruits and vegetables daily.He consumes 0 sweetened beverage(s) daily.He exercises with enough effort to increase his heart rate 9 or less minutes per day.  He exercises with enough effort to increase his heart rate 3 or less days per week.   He is taking medications  regularly.     Solo presents to the clinic for chronic medication follow up. I saw him last on 8/16/24 and refilled Tramadol at current dosing.    In March, we also refilled Wellbutrin 150 mg daily for depression and Levothyroxine 50 mcg daily for hypothyroidism.     We updated his CSA and UTOX at that visit as well. He is currently on Tramadol 50 mg QID for degenerative/osteoarthritis changes of the lumbar spine as well as failed back syndrome.     He follows externally with urology at [facility] for Aveed injections for hypogonadism.    Due for Tramadol, Wellbutrin and Synthroid refills.       Pain History:  When did you first notice your pain? - Chronic pain: Persistent or recurrent pain lasting longer than 3 months.   Have you seen this provider for your pain in the past? YES  Are you seeing anyone else for your pain?  PCP        3/4/2025    10:04 AM   Last PHQ-9   1.  Little interest or pleasure in doing things 1   2.  Feeling down, depressed, or hopeless 0   3.  Trouble falling or staying asleep, or sleeping too much 1   4.  Feeling tired or having little energy 1   5.  Poor appetite or overeating 0   6.  Feeling bad about yourself 0   7.  Trouble concentrating 1   8.  Moving slowly or restless 0   Q9: Thoughts of better off dead/self-harm past 2 weeks 0   PHQ-9 Total Score 4        Patient-reported         3/15/2024     1:50 PM 8/14/2024     6:23 PM 3/4/2025    10:04 AM   PHQ   PHQ-9 Total Score 5 6 4    Q9: Thoughts of better off dead/self-harm past 2 weeks Not at all Not at all Not at all       Patient-reported         3/2/2025     6:16 PM   BLAKE-7    1. Feeling nervous, anxious, or on edge 0   2. Not being able to stop or control worrying 0   3. Worrying too much about different things 0   4. Trouble relaxing 1   5. Being so restless that it is hard to sit still 1   6. Becoming easily annoyed or irritable 0   7. Feeling afraid, as if something awful might happen 0   BLAKE-7 Total Score 2    If you  checked any problems, how difficult have they made it for you to do your work, take care of things at home, or get along with other people? Not difficult at all       Patient-reported         3/15/2024     1:51 PM 8/14/2024     6:24 PM 3/2/2025     6:16 PM   BLAKE-7 SCORE   Total Score 7 (mild anxiety) 5 (mild anxiety) 2 (minimal anxiety)   Total Score 7 5 2        Patient-reported         4/20/2023    11:30 AM 3/15/2024     2:06 PM   PEG Score   PEG Total Score 5.67 4.33     Chronic Pain Follow Up:  - Chronic pain diagnoses / problems: low back osteoarthritis, s/p pain after spine surgery     Analgesia/pain control:   - Recent changes:  none   - Overall control: Tolerable with discomfort   - Current treatments: Tramadol for chronic pain management.   - Quantity per month: 60  - Clinic visit frequency: Q 6  months     - Benzodiazepine use: NO    Adherence:     - Do you ever take more pain medicine than prescribed? No    Adverse effects: No     Hypertension Follow-up  Do you check your blood pressure regularly outside of the clinic? No   Are you following a low salt diet? Yes  Are your blood pressures ever more than 140 on the top number (systolic) OR more   than 90 on the bottom number (diastolic), for example 140/90? No    Depression and Anxiety   How are you doing with your depression since your last visit? No change  How are you doing with your anxiety since your last visit?  No change  Are you having other symptoms that might be associated with depression or anxiety? No  Have you had a significant life event? No   Do you have any concerns with your use of alcohol or other drugs? No    Social History     Tobacco Use    Smoking status: Never     Passive exposure: Never    Smokeless tobacco: Never   Vaping Use    Vaping status: Never Used   Substance Use Topics    Alcohol use: No    Drug use: Never         3/15/2024     1:50 PM 8/14/2024     6:23 PM 3/4/2025    10:04 AM   PHQ   PHQ-9 Total Score 5 6 4    Q9: Thoughts  of better off dead/self-harm past 2 weeks Not at all Not at all Not at all       Patient-reported         3/15/2024     1:51 PM 8/14/2024     6:24 PM 3/2/2025     6:16 PM   BLAKE-7 SCORE   Total Score 7 (mild anxiety) 5 (mild anxiety) 2 (minimal anxiety)   Total Score 7 5 2        Patient-reported     Hypothyroidism Follow-up  Since last visit, patient describes the following symptoms: Weight stable, no hair loss, no skin changes, no constipation, no loose stools    Review of Systems  Constitutional, HEENT, cardiovascular, pulmonary, GI, , musculoskeletal, neuro, skin, endocrine and psych systems are negative, except as otherwise noted.    Objective    /78 (BP Location: Right arm, Patient Position: Sitting, Cuff Size: Adult Regular)   Pulse 76   Temp (!) 96.5  F (35.8  C) (Tympanic)   Resp 20   Wt 70.8 kg (156 lb)   BMI 25.56 kg/m    Body mass index is 25.56 kg/m .  Physical Exam   GENERAL: alert and no distress  EYES: Eyes grossly normal to inspection  NECK: no adenopathy, no asymmetry, masses, or scars  RESP: lungs clear to auscultation - no rales, rhonchi or wheezes  CV: regular rate and rhythm, normal S1 S2, no S3 or S4, no murmur, click or rub, no peripheral edema  MS: no gross musculoskeletal defects noted, no edema  SKIN: no suspicious lesions or rashes  PSYCH: mentation appears normal, affect normal/bright    Results for orders placed or performed in visit on 03/04/25   Vitamin B12     Status: Normal   Result Value Ref Range    Vitamin B12 869 232 - 1,245 pg/mL   TSH Reflex GH     Status: Normal   Result Value Ref Range    TSH 3.46 0.30 - 4.20 uIU/mL   Urine Creatinine for Drug Screen Panel     Status: None   Result Value Ref Range    Creatinine Urine for Drug Screen 83 mg/dL   Drug Confirmation Panel Urine with Creatinine     Status: None (In process)    Narrative    The following orders were created for panel order Drug Confirmation Panel Urine with Creatinine.  Procedure                                Abnormality         Status                     ---------                               -----------         ------                     Urine Drug Confirmation ...[[id number]]                      In process                 Urine Creatinine for Shree...[[id number]]                      Final result                 Please view results for these tests on the individual orders.     Signed Electronically by: Vanita Ovalle PA-C

## 2025-03-04 NOTE — LETTER

## 2025-03-06 LAB
N-NORTRAMADOL/CREAT UR CFM: 6771 NG/MG {CREAT}
O-NORTRAMADOL UR CFM-MCNC: 5620 NG/ML
TRAMADOL CTO UR CFM-MCNC: ABNORMAL NG/ML
TRAMADOL/CREAT UR: ABNORMAL

## 2025-04-23 ENCOUNTER — OFFICE VISIT (OUTPATIENT)
Dept: FAMILY MEDICINE | Facility: OTHER | Age: 70
End: 2025-04-23
Attending: STUDENT IN AN ORGANIZED HEALTH CARE EDUCATION/TRAINING PROGRAM
Payer: MEDICARE

## 2025-04-23 VITALS
BODY MASS INDEX: 25.07 KG/M2 | SYSTOLIC BLOOD PRESSURE: 138 MMHG | TEMPERATURE: 98.2 F | HEART RATE: 84 BPM | OXYGEN SATURATION: 98 % | HEIGHT: 66 IN | WEIGHT: 156 LBS | RESPIRATION RATE: 18 BRPM | DIASTOLIC BLOOD PRESSURE: 88 MMHG

## 2025-04-23 DIAGNOSIS — S80.862A TICK BITE OF LEFT LOWER LEG, INITIAL ENCOUNTER: Primary | ICD-10-CM
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
## 2025-04-23 PROCEDURE — G0463 HOSPITAL OUTPT CLINIC VISIT: HCPCS

## 2025-04-23 RX ORDER — DOXYCYCLINE 100 MG/1
100 TABLET ORAL DAILY
Qty: 10 TABLET | Refills: 0 | Status: SHIPPED | OUTPATIENT
Start: 2025-04-23 | End: 2025-05-03

## 2025-04-23 ASSESSMENT — PATIENT HEALTH QUESTIONNAIRE - PHQ9
10. IF YOU CHECKED OFF ANY PROBLEMS, HOW DIFFICULT HAVE THESE PROBLEMS MADE IT FOR YOU TO DO YOUR WORK, TAKE CARE OF THINGS AT HOME, OR GET ALONG WITH OTHER PEOPLE: NOT DIFFICULT AT ALL
SUM OF ALL RESPONSES TO PHQ QUESTIONS 1-9: 0
SUM OF ALL RESPONSES TO PHQ QUESTIONS 1-9: 0

## 2025-04-23 ASSESSMENT — PAIN SCALES - GENERAL: PAINLEVEL_OUTOF10: MILD PAIN (2)

## 2025-04-23 NOTE — NURSING NOTE
"Chief Complaint   Patient presents with    Insect Bites     Tick       Initial /88 (BP Location: Left arm, Patient Position: Sitting, Cuff Size: Adult Regular)   Pulse 84   Temp 98.2  F (36.8  C) (Temporal)   Resp 18   Ht 1.664 m (5' 5.5\")   Wt 70.8 kg (156 lb)   SpO2 98%   BMI 25.56 kg/m   Estimated body mass index is 25.56 kg/m  as calculated from the following:    Height as of this encounter: 1.664 m (5' 5.5\").    Weight as of this encounter: 70.8 kg (156 lb).  Medication Review: complete    The next two questions are to help us understand your food security.  If you are feeling you need any assistance in this area, we have resources available to support you today.          3/15/2024   SDOH- Food Insecurity   Within the past 12 months, did you worry that your food would run out before you got money to buy more? N   Within the past 12 months, did the food you bought just not last and you didn t have money to get more? N         Health Care Directive:  Patient does not have a Health Care Directive: Discussed advance care planning with patient; however, patient declined at this time.    Anahy Santiago LPN      "
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
## 2025-06-11 NOTE — TELEPHONE ENCOUNTER
Patient Information     Patient Name MRN Km Ronquillo [id number] Male 1955      Telephone Encounter by Marichuy Floyd at 2018  3:05 PM     Author:  Marichuy Floyd Service:  (none) Author Type:  (none)     Filed:  2018  3:09 PM Encounter Date:  2018 Status:  Signed     :  Marichuy Floyd            Spoke with patients wife who confirmed patients last name and birth date. She stated patient was incorrect when he told Andres Flor MD today that he needed his prescription for Methadone on , patient will be leaving on that day and would need to pick it up and fill it on . They are aware that Andres Flor MD is out of the office the rest of today.  Please advise  Marichuy Floyd LPN............................ 2018 3:06 PM          
Patient Information     Patient Name MRN Km Ronquillo [id number] Male 1955      Telephone Encounter by Marichuy Floyd at 2018  2:44 PM     Author:  Marichuy Floyd Service:  (none) Author Type:  (none)     Filed:  2018  2:45 PM Encounter Date:  2018 Status:  Signed     :  Marichuy Floyd            Left message to call back.  Marichuy Floyd LPN............................ 2018 2:45 PM            
Patient Information     Patient Name MRN Sex Km Wei [id number] Male 1955      Telephone Encounter by Kisha Valdovinos at 2018  1:21 PM     Author:  Kisha Valdovinos Service:  (none) Author Type:  (none)     Filed:  2018  1:22 PM Encounter Date:  2018 Status:  Signed     :  Kisha Valdovinos            Called Pt with message  Kisah Valdovinos ....................  2018   1:22 PM          
Patient Information     Patient Name MRN Sex Km Wei [id number] Male 1955      Telephone Encounter by Alison Gandara at 2018 11:36 AM     Author:  Alison Gandara Service:  (none) Author Type:  (none)     Filed:  2018 11:37 AM Encounter Date:  2018 Status:  Signed     :  Alison Gandara            I can call the pharmacy if given the OK to fill on 18.  Mckenzie Gandara LPN ...... 2018 11:36 AM          
Patient Information     Patient Name MRN mK Ronquillo [id number] Male 1955      Telephone Encounter by Andres Flor MD at 2018 12:05 PM     Author:  Andres Flor MD Service:  (none) Author Type:  Physician     Filed:  2018 12:05 PM Encounter Date:  2018 Status:  Signed     :  Andres Flor MD (Physician)            Yes, it is OK to do the .  Andres Flor MD ....................  2018   12:05 PM          
No

## 2025-08-11 DIAGNOSIS — M96.1 FAILED BACK SYNDROME OF LUMBAR SPINE: ICD-10-CM

## 2025-08-11 DIAGNOSIS — M51.372 DEGENERATION OF INTERVERTEBRAL DISC OF LUMBOSACRAL REGION WITH DISCOGENIC BACK PAIN AND LOWER EXTREMITY PAIN: ICD-10-CM

## 2025-08-11 RX ORDER — TRAMADOL HYDROCHLORIDE 50 MG/1
50 TABLET ORAL EVERY 6 HOURS PRN
Qty: 60 TABLET | Refills: 2 | Status: SHIPPED | OUTPATIENT
Start: 2025-08-11

## (undated) RX ORDER — TESTOSTERONE CYPIONATE 200 MG/ML
INJECTION, SOLUTION INTRAMUSCULAR
Status: DISPENSED
Start: 2023-05-22

## (undated) RX ORDER — METHYLPREDNISOLONE SODIUM SUCCINATE 40 MG/ML
INJECTION, POWDER, LYOPHILIZED, FOR SOLUTION INTRAMUSCULAR; INTRAVENOUS
Status: DISPENSED
Start: 2021-08-30

## (undated) RX ORDER — METHYLPREDNISOLONE ACETATE 40 MG/ML
INJECTION, SUSPENSION INTRA-ARTICULAR; INTRALESIONAL; INTRAMUSCULAR; SOFT TISSUE
Status: DISPENSED
Start: 2021-08-30

## (undated) RX ORDER — TESTOSTERONE CYPIONATE 200 MG/ML
INJECTION, SOLUTION INTRAMUSCULAR
Status: DISPENSED
Start: 2023-07-20

## (undated) RX ORDER — TESTOSTERONE CYPIONATE 200 MG/ML
INJECTION, SOLUTION INTRAMUSCULAR
Status: DISPENSED
Start: 2023-05-08